# Patient Record
Sex: FEMALE | Race: WHITE | NOT HISPANIC OR LATINO | Employment: FULL TIME | URBAN - METROPOLITAN AREA
[De-identification: names, ages, dates, MRNs, and addresses within clinical notes are randomized per-mention and may not be internally consistent; named-entity substitution may affect disease eponyms.]

---

## 2017-06-01 ENCOUNTER — OFFICE VISIT (OUTPATIENT)
Dept: URGENT CARE | Facility: CLINIC | Age: 52
End: 2017-06-01
Payer: COMMERCIAL

## 2017-06-01 PROCEDURE — 99214 OFFICE O/P EST MOD 30 MIN: CPT

## 2017-06-28 ENCOUNTER — APPOINTMENT (OUTPATIENT)
Dept: LAB | Facility: CLINIC | Age: 52
End: 2017-06-28
Payer: COMMERCIAL

## 2017-06-28 ENCOUNTER — TRANSCRIBE ORDERS (OUTPATIENT)
Dept: LAB | Facility: CLINIC | Age: 52
End: 2017-06-28

## 2017-06-28 ENCOUNTER — ALLSCRIPTS OFFICE VISIT (OUTPATIENT)
Dept: OTHER | Facility: OTHER | Age: 52
End: 2017-06-28

## 2017-06-28 DIAGNOSIS — Z12.11 ENCOUNTER FOR SCREENING FOR MALIGNANT NEOPLASM OF COLON: ICD-10-CM

## 2017-06-28 DIAGNOSIS — K21.9 GASTRO-ESOPHAGEAL REFLUX DISEASE WITHOUT ESOPHAGITIS: ICD-10-CM

## 2017-06-28 DIAGNOSIS — Z13.6 ENCOUNTER FOR SCREENING FOR CARDIOVASCULAR DISORDERS: ICD-10-CM

## 2017-06-28 DIAGNOSIS — Z12.39 ENCOUNTER FOR OTHER SCREENING FOR MALIGNANT NEOPLASM OF BREAST: ICD-10-CM

## 2017-06-28 LAB — HEMOCCULT STL QL IA: NEGATIVE

## 2017-06-28 PROCEDURE — G0328 FECAL BLOOD SCRN IMMUNOASSAY: HCPCS

## 2017-07-11 ENCOUNTER — ALLSCRIPTS OFFICE VISIT (OUTPATIENT)
Dept: OTHER | Facility: OTHER | Age: 52
End: 2017-07-11

## 2017-07-27 ENCOUNTER — GENERIC CONVERSION - ENCOUNTER (OUTPATIENT)
Dept: OTHER | Facility: OTHER | Age: 52
End: 2017-07-27

## 2017-08-04 ENCOUNTER — GENERIC CONVERSION - ENCOUNTER (OUTPATIENT)
Dept: OTHER | Facility: OTHER | Age: 52
End: 2017-08-04

## 2018-01-12 VITALS
HEIGHT: 67 IN | OXYGEN SATURATION: 97 % | HEART RATE: 87 BPM | BODY MASS INDEX: 41.65 KG/M2 | WEIGHT: 265.38 LBS | RESPIRATION RATE: 18 BRPM | DIASTOLIC BLOOD PRESSURE: 76 MMHG | TEMPERATURE: 97.3 F | SYSTOLIC BLOOD PRESSURE: 122 MMHG

## 2018-01-12 NOTE — PROGRESS NOTES
Assessment    1  Encounter for preventive health examination (V70 0) (Z00 00)    Plan  Allergic rhinitis, unspecified    · ZyrTEC Allergy 10 MG Oral Tablet; TAKE 1 TABLET AT BEDTIME  Esophageal reflux    · (1) CBC/ PLT (NO DIFF); Status:Active; Requested BHAVIN:[id number];    · (1) COMPREHENSIVE METABOLIC PANEL; Status:Active; Requested YRX:[id number];   PMH: Head congestion    · ProAir HFA AERS  PMH: History of acute bronchitis    · Proventil  (90 Base) MCG/ACT Inhalation Aerosol Solution   · Azithromycin 250 MG Oral Tablet  Screening for breast cancer    · MAMMO SCREENING BILATERAL W 3D & CAD; Status:Hold For -  Scheduling,Retrospective By Protocol Authorization; Requested ILJ:[id number];   Screening for cardiovascular condition    · (1) LIPID PANEL, FASTING; Status:Active; Requested [id number]:[id number];   Screening for colon cancer    · (1) OCCULT BLOOD, FECAL IMMUNOCHEMICAL TEST; Status:Canceled -  Retrospective By Protocol Authorization; Discussion/Summary    Health maintenance  - Anticipatory guidance given re: diet, exercise and monitor weight on fu: screen for sleep apnea negative  - Non smoker  - Vaccines up to date  - Check fasting labs  - Check FIIT test, pt to get pap/mammo shortly through her OBGYN  - Reviewed pulmonary note: pt does not have asthma but has allergies that flare with seasonal change and cause hyper reactive airways  Symptoms worse before fall: pt was instructed to make a fu apptt before fall season and may consider trial of singulair to manage allergies and/or allergy testing and fu with allergy specialist  - Discussed ice, rest, stretches for achilles tendonitis  Possible side effects of new medications were reviewed with the patient/guardian today  The treatment plan was reviewed with the patient/guardian   The patient/guardian understands and agrees with the treatment plan      Chief Complaint  well visit      History of Present Illness  HPI: Lacey Craft is here for a routine physical   She is currently employed as a   She lives at home with her   She rates her health as very good  She does exercise for 3 days/week and eats a healthy diet  She has no symptoms of depression  She does not smoke, drinks alcohol socially, no use of recreational drugs  She is menopausal (surgical)  She has had negative HIV testing  She occasionally notes issues with leaking of urine  She continues to note recurrent bronchitis that most commonly is triggered by worsening allergies which in turn are aggravated by change in weather and is worst in the fall  Her only other concern is a painful bump behind her right heel, worse with walking and at night  Review of Systems    Constitutional: No fever, no chills, feels well, no tiredness, no recent weight gain or weight loss  Eyes: No complaints of eye pain, no red eyes, no eyesight problems, no discharge, no dry eyes, no itching of eyes  ENT: no complaints of earache, no loss of hearing, no nose bleeds, no nasal discharge, no sore throat, no hoarseness  Cardiovascular: No complaints of slow heart rate, no fast heart rate, no chest pain, no palpitations, no leg claudication, no lower extremity edema  Respiratory: No complaints of shortness of breath, no wheezing, no cough, no SOB on exertion, no orthopnea, no PND  Gastrointestinal: No complaints of abdominal pain, no constipation, no nausea or vomiting, no diarrhea, no bloody stools  Genitourinary: No complaints of dysuria, no incontinence, no pelvic pain, no dysmenorrhea, no vaginal discharge or bleeding  Musculoskeletal: No complaints of arthralgias, no myalgias, no joint swelling or stiffness, no limb pain or swelling  Integumentary: No complaints of skin rash or lesions, no itching, no skin wounds, no breast pain or lump     Neurological: No complaints of headache, no confusion, no convulsions, no numbness, no dizziness or fainting, no tingling, no limb weakness, no difficulty walking  Psychiatric: Not suicidal, no sleep disturbance, no anxiety or depression, no change in personality, no emotional problems  Endocrine: No complaints of proptosis, no hot flashes, no muscle weakness, no deepening of the voice, no feelings of weakness  Hematologic/Lymphatic: No complaints of swollen glands, no swollen glands in the neck, does not bleed easily, does not bruise easily  Active Problems    1  Allergic rhinitis, unspecified (477 9) (J30 9)   2  Chronic sinusitis with recurrent bronchitis (473 9,490) (J32 9,J40)   3  Esophageal reflux (530 81) (K21 9)   4  Obesity (278 00) (E66 9)   5  Urge incontinence (788 31) (N39 41)   6  Vitamin D deficiency (268 9) (E55 9)    Past Medical History    · Acute bacterial rhinosinusitis (461 9) (J01 90,B96 89)   · History of Acute frontal sinusitis (461 1) (J01 10)   · History of Chest congestion (786 9) (R09 89)   · History of Cough (786 2) (R05)   · History of acute bronchitis (V12 69) (Z87 09)   · History of bronchitis (V12 69) (Z87 09)   · History of pneumonia (V12 61) (Z87 01)    Surgical History    · History of Appendectomy   · History of  Section   · History of Hysterectomy   · History of Knee Surgery    Family History  Mother    · Family history of thyroid disease (V18 19) (Z80 46)    Social History    · Denied: History of Alcohol   · Never a smoker   · History of Never A Smoker   · Denied: History of No drug use    Current Meds   1  Azithromycin 250 MG Oral Tablet; TAKE 2 TABLETS ON DAY 1 THEN TAKE 1 TABLET A   DAY FOR 4 DAYS; Therapy: [id number] to (Last Rx:2017)  Requested for: 2017 Ordered   2  PriLOSEC OTC TBEC; Therapy: (Recorded:2014) to Recorded   3  ProAir  (90 Base) MCG/ACT Inhalation Aerosol Solution; INHALE 2 PUFFS   EVERY 4 HOURS AS NEEDED; Therapy: 45PBU7532 to (Last Rx:2017)  Requested for: 2017 Ordered   4  ProAir HFA AERS; Therapy: (Recorded:09Ufe3502) to Recorded   5  Proventil  (90 Base) MCG/ACT Inhalation Aerosol Solution; INHALE 1 PUFF   EVERY 4 HOURS AS NEEDED; Therapy: 79GLX9905 to (Last Rx:34Svj0061)  Requested for: 71BGH2645 Ordered    Allergies    1  Codeine Sulfate TABS   2  PredniSONE TABS   3  Sulfa Drugs   4  Vicodin TABS    5  Tape    Vitals   Recorded: 28Jun2017 08:23AM   Temperature 97 2 F   Heart Rate 89   Respiration 18   Systolic 330   Diastolic 86   Height 5 ft 7 in   Weight 266 lb 2 0 oz   BMI Calculated 41 68   BSA Calculated 2 28   O2 Saturation 97     Physical Exam    Constitutional   General appearance: No acute distress, well appearing and well nourished  Eyes   Conjunctiva and lids: No swelling, erythema or discharge  Pupils and irises: Equal, round and reactive to light  Ears, Nose, Mouth, and Throat   External inspection of ears and nose: Abnormal   left nasal middle turbinate hypertrophy  Otoscopic examination: Tympanic membranes translucent with normal light reflex  Canals patent without erythema  Oropharynx: Normal with no erythema, edema, exudate or lesions  Pulmonary   Respiratory effort: No increased work of breathing or signs of respiratory distress  Auscultation of lungs: Clear to auscultation  Cardiovascular   Palpation of heart: Normal PMI, no thrills  Auscultation of heart: Normal rate and rhythm, normal S1 and S2, without murmurs  Examination of extremities for edema and/or varicosities: Normal     Abdomen   Abdomen: Non-tender, no masses  Liver and spleen: No hepatomegaly or splenomegaly  Lymphatic   Palpation of lymph nodes in neck: No lymphadenopathy  Musculoskeletal   Gait and station: Normal     Digits and nails: Normal without clubbing or cyanosis  Inspection/palpation of joints, bones, and muscles: Abnormal   tenderness to palpation over right heel, achilles tendon insertion site  Skin   Skin and subcutaneous tissue: Normal without rashes or lesions      Neurologic   Cranial nerves: Cranial nerves 2-12 intact  Reflexes: 2+ and symmetric  Sensation: No sensory loss      Psychiatric   Orientation to person, place, and time: Normal     Mood and affect: Normal        Signatures   Electronically signed by : Jose De Jessu Fowler MD; Jun 28 2017  9:03AM EST                       (Author)

## 2018-01-13 VITALS
OXYGEN SATURATION: 97 % | SYSTOLIC BLOOD PRESSURE: 122 MMHG | HEART RATE: 89 BPM | BODY MASS INDEX: 41.77 KG/M2 | TEMPERATURE: 97.2 F | HEIGHT: 67 IN | RESPIRATION RATE: 18 BRPM | WEIGHT: 266.13 LBS | DIASTOLIC BLOOD PRESSURE: 86 MMHG

## 2018-07-23 ENCOUNTER — TELEPHONE (OUTPATIENT)
Dept: UROLOGY | Facility: AMBULATORY SURGERY CENTER | Age: 53
End: 2018-07-23

## 2018-07-23 NOTE — TELEPHONE ENCOUNTER
Reason for appointment/Complaint/Diagnosis : referred by PCP brother had kidney ca     Insurance: [employer]    History of Cancer? NO    Previous urologist?    NONE                  Records requested/where? No    Outside testing/where? NONE    Location Preference for office visit?  Dr Jeneal Ahumada in the [facility] office

## 2018-10-08 ENCOUNTER — OFFICE VISIT (OUTPATIENT)
Dept: UROLOGY | Facility: CLINIC | Age: 53
End: 2018-10-08
Payer: COMMERCIAL

## 2018-10-08 VITALS
SYSTOLIC BLOOD PRESSURE: 142 MMHG | HEIGHT: 67 IN | WEIGHT: 262 LBS | DIASTOLIC BLOOD PRESSURE: 98 MMHG | BODY MASS INDEX: 41.12 KG/M2

## 2018-10-08 DIAGNOSIS — R32 URINARY INCONTINENCE, UNSPECIFIED TYPE: Primary | ICD-10-CM

## 2018-10-08 DIAGNOSIS — N20.0 CALCULUS OF KIDNEY: ICD-10-CM

## 2018-10-08 LAB
SL AMB  POCT GLUCOSE, UA: NORMAL
SL AMB LEUKOCYTE ESTERASE,UA: NORMAL
SL AMB POCT BILIRUBIN,UA: NORMAL
SL AMB POCT BLOOD,UA: NORMAL
SL AMB POCT CLARITY,UA: NORMAL
SL AMB POCT COLOR,UA: YELLOW
SL AMB POCT KETONES,UA: NORMAL
SL AMB POCT NITRITE,UA: NORMAL
SL AMB POCT PH,UA: 6.5
SL AMB POCT SPECIFIC GRAVITY,UA: 1.01
SL AMB POCT URINE PROTEIN: NORMAL
SL AMB POCT UROBILINOGEN: NORMAL

## 2018-10-08 PROCEDURE — 81002 URINALYSIS NONAUTO W/O SCOPE: CPT | Performed by: UROLOGY

## 2018-10-08 PROCEDURE — 99244 OFF/OP CNSLTJ NEW/EST MOD 40: CPT | Performed by: UROLOGY

## 2018-10-08 RX ORDER — OMEPRAZOLE 10 MG/1
10 CAPSULE, DELAYED RELEASE ORAL
COMMUNITY
End: 2018-12-28

## 2018-10-08 RX ORDER — CETIRIZINE HYDROCHLORIDE 10 MG/1
10 TABLET ORAL
COMMUNITY

## 2018-10-17 ENCOUNTER — HOSPITAL ENCOUNTER (OUTPATIENT)
Dept: ULTRASOUND IMAGING | Facility: HOSPITAL | Age: 53
Discharge: HOME | End: 2018-10-17
Payer: COMMERCIAL

## 2018-10-17 DIAGNOSIS — N20.0 CALCULUS OF KIDNEY: ICD-10-CM

## 2018-10-17 PROCEDURE — 76770 US EXAM ABDO BACK WALL COMP: CPT

## 2018-12-28 ENCOUNTER — OFFICE VISIT (OUTPATIENT)
Dept: URGENT CARE | Facility: CLINIC | Age: 53
End: 2018-12-28
Payer: COMMERCIAL

## 2018-12-28 VITALS
DIASTOLIC BLOOD PRESSURE: 90 MMHG | BODY MASS INDEX: 41.12 KG/M2 | RESPIRATION RATE: 18 BRPM | HEART RATE: 66 BPM | WEIGHT: 262 LBS | TEMPERATURE: 98.8 F | OXYGEN SATURATION: 96 % | HEIGHT: 67 IN | SYSTOLIC BLOOD PRESSURE: 142 MMHG

## 2018-12-28 DIAGNOSIS — J20.8 ACUTE BACTERIAL BRONCHITIS: Primary | ICD-10-CM

## 2018-12-28 DIAGNOSIS — B96.89 ACUTE BACTERIAL BRONCHITIS: Primary | ICD-10-CM

## 2018-12-28 LAB — S PYO AG THROAT QL: NEGATIVE

## 2018-12-28 PROCEDURE — 99213 OFFICE O/P EST LOW 20 MIN: CPT | Performed by: PHYSICIAN ASSISTANT

## 2018-12-28 PROCEDURE — 87430 STREP A AG IA: CPT | Performed by: PHYSICIAN ASSISTANT

## 2018-12-28 RX ORDER — INFLUENZA VIRUS VACCINE 15; 15; 15; 15 UG/.5ML; UG/.5ML; UG/.5ML; UG/.5ML
SUSPENSION INTRAMUSCULAR
Refills: 0 | COMMUNITY
Start: 2018-10-02

## 2018-12-28 RX ORDER — FLUTICASONE PROPIONATE 110 UG/1
2 AEROSOL, METERED RESPIRATORY (INHALATION) 2 TIMES DAILY
Qty: 1 INHALER | Refills: 0 | Status: SHIPPED | OUTPATIENT
Start: 2018-12-28 | End: 2019-10-29

## 2018-12-28 RX ORDER — AZITHROMYCIN 250 MG/1
TABLET, FILM COATED ORAL
Qty: 6 TABLET | Refills: 0 | Status: SHIPPED | OUTPATIENT
Start: 2018-12-28 | End: 2019-01-01

## 2018-12-28 RX ORDER — ALBUTEROL SULFATE 90 UG/1
2 AEROSOL, METERED RESPIRATORY (INHALATION) EVERY 6 HOURS PRN
Qty: 6.7 G | Refills: 0 | Status: SHIPPED | OUTPATIENT
Start: 2018-12-28 | End: 2019-10-29

## 2018-12-28 NOTE — PROGRESS NOTES
[facility]        NAME: Anai Guerra is a 47 y o  female  : 1965    MRN: [id number]  DATE: 2018  TIME: 10:53 AM    Assessment and Plan   Acute bacterial bronchitis [J20 8, B96 89]  1  Acute bacterial bronchitis  POCT rapid strepA    azithromycin (ZITHROMAX) 250 mg tablet    albuterol (PROVENTIL HFA) 90 mcg/act inhaler    fluticasone (FLOVENT HFA) 110 MCG/ACT inhaler     Rapid strep negative    Patient Instructions     Follow up with PCP in 3-5 days  Proceed to  ER if symptoms worsen  Chief Complaint     Chief Complaint   Patient presents with    Sore Throat     C/O sore throat, swollen glands and occasional wheezing x 4 days  History of Present Illness       47 y o  Female presents with sore throat, cough and wheezing for 4 days  Patient states she has a hx of bronchitis  When she gets bronchitis she presents with sore throat  There is no fever  She feels tight in the chest and wheezing more at night  Patient has an inhaler at home she is unsure of the name, she states it has a steroid in it  She ran out of this inhaler  She states she was diagnosed with an allergy induced asthma  She does not take inhalers on a daily basis for the asthma  Review of Systems   Review of Systems   Constitutional: Negative for chills, fatigue and fever  HENT: Positive for sore throat  Negative for congestion, ear pain, sinus pain and trouble swallowing  Eyes: Negative for pain, discharge and redness  Respiratory: Positive for cough and chest tightness  Negative for shortness of breath and wheezing  Cardiovascular: Negative for chest pain, palpitations and leg swelling  Gastrointestinal: Negative for abdominal pain, diarrhea, nausea and vomiting  Musculoskeletal: Negative for arthralgias, joint swelling and myalgias  Skin: Negative for rash  Neurological: Negative for dizziness, weakness, numbness and headaches           Current Medications       Current Outpatient Prescriptions:     albuterol (PROVENTIL HFA) 90 mcg/act inhaler, Inhale 2 puffs every 6 (six) hours as needed for wheezing, Disp: 6 7 g, Rfl: 0    azithromycin (ZITHROMAX) 250 mg tablet, Take 2 tablets today then 1 tablet daily x 4 days, Disp: 6 tablet, Rfl: 0    cetirizine (ZyrTEC) 10 mg tablet, Take 10 mg by mouth, Disp: , Rfl:     FLUARIX QUADRIVALENT 0 5 ML SHILO, inject 0 5 milliliter intramuscularly, Disp: , Rfl: 0    fluticasone (FLOVENT HFA) 110 MCG/ACT inhaler, Inhale 2 puffs 2 (two) times a day Rinse mouth after use , Disp: 1 Inhaler, Rfl: 0    Current Allergies     Allergies as of 2018 - Reviewed 2018   Allergen Reaction Noted    Prednisone Other (See Comments) 2016    Sulfa antibiotics Hives 2016    Vicodin [hydrocodone-acetaminophen] Anxiety 2016    Codeine Hives 2016    Colistin      Other              The following portions of the patient's history were reviewed and updated as appropriate: allergies, current medications, past family history, past medical history, past social history, past surgical history and problem list      Past Medical History:   Diagnosis Date    Asthma due to seasonal allergies     Kidney stone        Past Surgical History:   Procedure Laterality Date    APPENDECTOMY  10/1982     SECTION      two- [address]    partial    REPLACEMENT TOTAL KNEE BILATERAL      right- , left-        Family History   Problem Relation Age of Onset    Heart disease Father     Hypertension Father     Urolithiasis Mother     Kidney cancer Brother     Urolithiasis Brother     Hepatitis Brother         hep c    Kidney disease Brother     Urolithiasis Sister     Diabetes Sister     Kidney disease Sister     Urolithiasis Daughter          Medications have been verified          Objective   /90   Pulse 66   Temp 98 8 °F (37 1 °C) (Tympanic)   Resp 18   Ht 5' 6 5" (1 689 m)   Wt 119 kg (262 lb) LMP  (LMP Unknown)   SpO2 96%   BMI 41 65 kg/m²        Physical Exam     Physical Exam   Constitutional: She is oriented to person, place, and time  She appears well-developed and well-nourished  No distress  HENT:   Head: Normocephalic  Right Ear: External ear normal    Left Ear: External ear normal    Mouth/Throat: Oropharynx is clear and moist    Eyes: Pupils are equal, round, and reactive to light  Conjunctivae and EOM are normal    Neck: Normal range of motion  Neck supple  Cardiovascular: Normal rate, regular rhythm and normal heart sounds  No murmur heard  Pulmonary/Chest: Effort normal  No respiratory distress  She has wheezes in the right lower field  Abdominal: Soft  Bowel sounds are normal  There is no tenderness  Musculoskeletal: Normal range of motion  Lymphadenopathy:     She has no cervical adenopathy  Neurological: She is alert and oriented to person, place, and time  She has normal reflexes  Skin: Skin is warm and dry  Psychiatric: She has a normal mood and affect  Nursing note and vitals reviewed

## 2019-10-29 ENCOUNTER — APPOINTMENT (EMERGENCY)
Dept: CT IMAGING | Facility: HOSPITAL | Age: 54
End: 2019-10-29
Payer: COMMERCIAL

## 2019-10-29 ENCOUNTER — HOSPITAL ENCOUNTER (EMERGENCY)
Facility: HOSPITAL | Age: 54
Discharge: HOME | End: 2019-10-29
Attending: EMERGENCY MEDICINE | Admitting: EMERGENCY MEDICINE
Payer: COMMERCIAL

## 2019-10-29 VITALS
SYSTOLIC BLOOD PRESSURE: 138 MMHG | RESPIRATION RATE: 16 BRPM | DIASTOLIC BLOOD PRESSURE: 80 MMHG | TEMPERATURE: 97 F | HEIGHT: 67 IN | HEART RATE: 95 BPM | WEIGHT: 235 LBS | OXYGEN SATURATION: 98 % | BODY MASS INDEX: 36.88 KG/M2

## 2019-10-29 DIAGNOSIS — K52.9 GASTROENTERITIS: Primary | ICD-10-CM

## 2019-10-29 LAB
ALBUMIN SERPL BCP-MCNC: 4.7 G/DL (ref 3.5–5.7)
ALP SERPL-CCNC: 93 U/L (ref 40–150)
ALT SERPL W P-5'-P-CCNC: 68 U/L (ref 7–52)
ANION GAP SERPL CALCULATED.3IONS-SCNC: 9 MMOL/L (ref 4–13)
AST SERPL W P-5'-P-CCNC: 19 U/L (ref 13–39)
BACTERIA UR QL AUTO: ABNORMAL /HPF
BASOPHILS # BLD AUTO: 0.1 THOUSANDS/ΜL (ref 0–0.1)
BASOPHILS NFR BLD AUTO: 1 % (ref 0–2)
BILIRUB SERPL-MCNC: 0.9 MG/DL (ref 0.2–1)
BILIRUB UR QL STRIP: NEGATIVE
BUN SERPL-MCNC: 15 MG/DL (ref 7–25)
CALCIUM SERPL-MCNC: 9.7 MG/DL (ref 8.6–10.5)
CHLORIDE SERPL-SCNC: 105 MMOL/L (ref 98–107)
CLARITY UR: CLEAR
CO2 SERPL-SCNC: 23 MMOL/L (ref 21–31)
COLOR UR: YELLOW
CREAT SERPL-MCNC: 0.69 MG/DL (ref 0.6–1.2)
EOSINOPHIL # BLD AUTO: 0.2 THOUSAND/ΜL (ref 0–0.61)
EOSINOPHIL NFR BLD AUTO: 2 % (ref 0–5)
ERYTHROCYTE [DISTWIDTH] IN BLOOD BY AUTOMATED COUNT: 14.1 % (ref 11.5–14.5)
GFR SERPL CREATININE-BSD FRML MDRD: 100 ML/MIN/1.73SQ M
GLUCOSE SERPL-MCNC: 113 MG/DL (ref 65–99)
GLUCOSE UR STRIP-MCNC: NEGATIVE MG/DL
HCT VFR BLD AUTO: 49.5 % (ref 42–47)
HGB BLD-MCNC: 16.3 G/DL (ref 12–16)
HGB UR QL STRIP.AUTO: NEGATIVE
KETONES UR STRIP-MCNC: NEGATIVE MG/DL
LEUKOCYTE ESTERASE UR QL STRIP: NEGATIVE
LIPASE SERPL-CCNC: <10 U/L (ref 11–82)
LYMPHOCYTES # BLD AUTO: 1.2 THOUSANDS/ΜL (ref 0.6–4.47)
LYMPHOCYTES NFR BLD AUTO: 11 % (ref 21–51)
MCH RBC QN AUTO: 27.2 PG (ref 26–34)
MCHC RBC AUTO-ENTMCNC: 32.9 G/DL (ref 31–37)
MCV RBC AUTO: 83 FL (ref 81–99)
MONOCYTES # BLD AUTO: 0.9 THOUSAND/ΜL (ref 0.17–1.22)
MONOCYTES NFR BLD AUTO: 9 % (ref 2–12)
MUCOUS THREADS UR QL AUTO: ABNORMAL
NEUTROPHILS # BLD AUTO: 7.9 THOUSANDS/ΜL (ref 1.4–6.5)
NEUTS SEG NFR BLD AUTO: 77 % (ref 42–75)
NITRITE UR QL STRIP: NEGATIVE
NON-SQ EPI CELLS URNS QL MICRO: ABNORMAL /HPF
PH UR STRIP.AUTO: 5.5 [PH]
PLATELET # BLD AUTO: 299 THOUSANDS/UL (ref 149–390)
PMV BLD AUTO: 8.3 FL (ref 8.6–11.7)
POTASSIUM SERPL-SCNC: 3.6 MMOL/L (ref 3.5–5.5)
PROT SERPL-MCNC: 7.3 G/DL (ref 6.4–8.9)
PROT UR STRIP-MCNC: ABNORMAL MG/DL
RBC # BLD AUTO: 5.98 MILLION/UL (ref 3.9–5.2)
RBC #/AREA URNS AUTO: ABNORMAL /HPF
SODIUM SERPL-SCNC: 137 MMOL/L (ref 134–143)
SP GR UR STRIP.AUTO: >=1.03 (ref 1–1.03)
UROBILINOGEN UR QL STRIP.AUTO: 0.2 E.U./DL
WBC # BLD AUTO: 10.3 THOUSAND/UL (ref 4.8–10.8)
WBC #/AREA URNS AUTO: ABNORMAL /HPF

## 2019-10-29 PROCEDURE — 83690 ASSAY OF LIPASE: CPT | Performed by: NURSE PRACTITIONER

## 2019-10-29 PROCEDURE — 74177 CT ABD & PELVIS W/CONTRAST: CPT

## 2019-10-29 PROCEDURE — 81001 URINALYSIS AUTO W/SCOPE: CPT | Performed by: NURSE PRACTITIONER

## 2019-10-29 PROCEDURE — 36415 COLL VENOUS BLD VENIPUNCTURE: CPT | Performed by: NURSE PRACTITIONER

## 2019-10-29 PROCEDURE — 80053 COMPREHEN METABOLIC PANEL: CPT | Performed by: NURSE PRACTITIONER

## 2019-10-29 PROCEDURE — 85025 COMPLETE CBC W/AUTO DIFF WBC: CPT | Performed by: NURSE PRACTITIONER

## 2019-10-29 PROCEDURE — 99284 EMERGENCY DEPT VISIT MOD MDM: CPT

## 2019-10-29 RX ORDER — ONDANSETRON 4 MG/1
4 TABLET, FILM COATED ORAL EVERY 6 HOURS
Qty: 12 TABLET | Refills: 0 | Status: SHIPPED | OUTPATIENT
Start: 2019-10-29 | End: 2020-01-17

## 2019-10-29 RX ORDER — SODIUM CHLORIDE 9 MG/ML
125 INJECTION, SOLUTION INTRAVENOUS CONTINUOUS
Status: DISCONTINUED | OUTPATIENT
Start: 2019-10-29 | End: 2019-10-29 | Stop reason: HOSPADM

## 2019-10-29 RX ORDER — DICYCLOMINE HCL 20 MG
20 TABLET ORAL 2 TIMES DAILY
Qty: 20 TABLET | Refills: 0 | Status: SHIPPED | OUTPATIENT
Start: 2019-10-29 | End: 2020-01-17

## 2019-10-29 RX ADMIN — SODIUM CHLORIDE 125 ML/HR: 0.9 INJECTION, SOLUTION INTRAVENOUS at 09:54

## 2019-10-29 RX ADMIN — IOHEXOL 100 ML: 350 INJECTION, SOLUTION INTRAVENOUS at 11:18

## 2019-10-29 NOTE — ED PROVIDER NOTES
History  Chief Complaint   Patient presents with    Abdominal Pain     started last Thursday, improvement over the weekend, and returned last night   Diarrhea    Vomiting     trouble keeping foods down     N/v/d intermittently since Thursday, no ill contacts           Prior to Admission Medications   Prescriptions Last Dose Informant Patient Reported? Taking? FLUARIX QUADRIVALENT 0 5 ML SHILO   Yes No   Sig: inject 0 5 milliliter intramuscularly   cetirizine (ZyrTEC) 10 mg tablet 10/28/2019 at Unknown time Self Yes Yes   Sig: Take 10 mg by mouth      Facility-Administered Medications: None       Past Medical History:   Diagnosis Date    Asthma due to seasonal allergies     Kidney stone        Past Surgical History:   Procedure Laterality Date    APPENDECTOMY  10/1982     SECTION      two-  &     HYSTERECTOMY      partial    REPLACEMENT TOTAL KNEE BILATERAL      right- , left-        Family History   Problem Relation Age of Onset    Heart disease Father     Hypertension Father     Urolithiasis Mother     Kidney cancer Brother     Urolithiasis Brother     Hepatitis Brother         hep c    Kidney disease Brother     Urolithiasis Sister     Diabetes Sister     Kidney disease Sister     Urolithiasis Daughter      I have reviewed and agree with the history as documented  Social History     Tobacco Use    Smoking status: Never Smoker    Smokeless tobacco: Never Used   Substance Use Topics    Alcohol use: Yes     Comment: occasional    Drug use: No        Review of Systems   Gastrointestinal: Positive for abdominal pain, diarrhea, nausea and vomiting  Physical Exam  Physical Exam   Constitutional: She is oriented to person, place, and time  She appears well-developed and well-nourished  HENT:   Head: Normocephalic and atraumatic  Eyes: Pupils are equal, round, and reactive to light   EOM are normal    Cardiovascular: Normal rate, regular rhythm, normal heart sounds and intact distal pulses  Pulmonary/Chest: Effort normal and breath sounds normal    Abdominal: Soft  Normal appearance and bowel sounds are normal    Neurological: She is alert and oriented to person, place, and time  Skin: Skin is warm and dry  Capillary refill takes less than 2 seconds  Nursing note and vitals reviewed        Vital Signs  ED Triage Vitals [10/29/19 0923]   Temperature Pulse Respirations Blood Pressure SpO2   (!) 96 9 °F (36 1 °C) 94 16 144/88 96 %      Temp Source Heart Rate Source Patient Position - Orthostatic VS BP Location FiO2 (%)   Temporal Monitor Sitting Left arm --      Pain Score       No Pain           Vitals:    10/29/19 0923   BP: 144/88   Pulse: 94   Patient Position - Orthostatic VS: Sitting         Visual Acuity      ED Medications  Medications   sodium chloride 0 9 % infusion (125 mL/hr Intravenous New Bag 10/29/19 0954)   iohexol (OMNIPAQUE) 350 MG/ML injection (MULTI-DOSE) 100 mL (100 mL Intravenous Given 10/29/19 1118)       Diagnostic Studies  Results Reviewed     Procedure Component Value Units Date/Time    Lipase [[id number]]  (Abnormal) Collected:  10/29/19 0954    Lab Status:  Final result Specimen:  Blood from Arm, Left Updated:  10/29/19 1046     Lipase <10 u/L     CMP [[id number]]  (Abnormal) Collected:  10/29/19 0954    Lab Status:  Final result Specimen:  Blood from Arm, Left Updated:  10/29/19 1045     Sodium 137 mmol/L      Potassium 3 6 mmol/L      Chloride 105 mmol/L      CO2 23 mmol/L      ANION GAP 9 mmol/L      BUN 15 mg/dL      Creatinine 0 69 mg/dL      Glucose 113 mg/dL      Calcium 9 7 mg/dL      AST 19 U/L      ALT 68 U/L      Alkaline Phosphatase 93 U/L      Total Protein 7 3 g/dL      Albumin 4 7 g/dL      Total Bilirubin 0 90 mg/dL      eGFR 100 ml/min/1 73sq m     Narrative:       [place] guidelines for Chronic Kidney Disease (CKD):     Stage 1 with normal or high GFR (GFR > 90 mL/min/1 73 square meters)   Stage 2 Mild CKD (GFR = 60-89 mL/min/1 73 square meters)    Stage 3A Moderate CKD (GFR = 45-59 mL/min/1 73 square meters)    Stage 3B Moderate CKD (GFR = 30-44 mL/min/1 73 square meters)    Stage 4 Severe CKD (GFR = 15-29 mL/min/1 73 square meters)    Stage 5 End Stage CKD (GFR <15 mL/min/1 73 square meters)  Note: GFR calculation is accurate only with a steady state creatinine    Urine Microscopic [[id number]]  (Abnormal) Collected:  10/29/19 1009    Lab Status:  Final result Specimen:  Urine, Clean Catch Updated:  10/29/19 1034     RBC, UA 1-2 /hpf      WBC, UA 2-4 /hpf      Epithelial Cells Moderate /hpf      Bacteria, UA Occasional /hpf      MUCUS THREADS Moderate    CBC and differential [[id number]]  (Abnormal) Collected:  10/29/19 0954    Lab Status:  Final result Specimen:  Blood from Arm, Left Updated:  10/29/19 1024     WBC 10 30 Thousand/uL      RBC 5 98 Million/uL      Hemoglobin 16 3 g/dL      Hematocrit 49 5 %      MCV 83 fL      MCH 27 2 pg      MCHC 32 9 g/dL      RDW 14 1 %      MPV 8 3 fL      Platelets 052 Thousands/uL      Neutrophils Relative 77 %      Lymphocytes Relative 11 %      Monocytes Relative 9 %      Eosinophils Relative 2 %      Basophils Relative 1 %      Neutrophils Absolute 7 90 Thousands/µL      Lymphocytes Absolute 1 20 Thousands/µL      Monocytes Absolute 0 90 Thousand/µL      Eosinophils Absolute 0 20 Thousand/µL      Basophils Absolute 0 10 Thousands/µL     UA w Reflex to Microscopic w Reflex to Culture [[id number]]  (Abnormal) Collected:  10/29/19 1009    Lab Status:  Final result Specimen:  Urine, Clean Catch Updated:  10/29/19 1024     Color, UA Yellow     Clarity, UA Clear     Specific Gravity, UA >=1 030     pH, UA 5 5     Leukocytes, UA Negative     Nitrite, UA Negative     Protein, UA Trace mg/dl      Glucose, UA Negative mg/dl      Ketones, UA Negative mg/dl      Urobilinogen, UA 0 2 E U /dl      Bilirubin, UA Negative     Blood, UA Negative                 CT abdomen pelvis with contrast   Final Result by Antonia Goodwin MD (10/29 2177)      No acute pathology  Colonic diverticulosis without diverticulitis  Hepatomegaly  Tiny left renal angiomyolipoma  Workstation performed: [id number]                    Procedures  Procedures       ED Course  ED Course as of Oct 29 1224   Tue Oct 29, 2019   1218 Outpt mgt with pcp                                   MDM    Disposition  Final diagnoses:   Gastroenteritis     Time reflects when diagnosis was documented in both MDM as applicable and the Disposition within this note     Time User Action Codes Description Comment    10/29/2019 12:20 PM Khloe Degree Add [K52 9] Gastroenteritis       ED Disposition     ED Disposition Condition Date/Time Comment    Discharge Stable Tue Oct 29, 2019 12:20 PM Ryan Arnold discharge to home/self care  Follow-up Information     Follow up With Specialties Details Why Tami Burton [address], [address] [facility], Nurse Practitioner   [address] [place], [place] [phone]            Patient's Medications   Discharge Prescriptions    DICYCLOMINE (BENTYL) 20 MG TABLET    Take 1 tablet (20 mg total) by mouth 2 (two) times a day       Start Date: 10/29/2019End Date: --       Order Dose: 20 mg       Quantity: 20 tablet    Refills: 0    ONDANSETRON (ZOFRAN) 4 MG TABLET    Take 1 tablet (4 mg total) by mouth every 6 (six) hours       Start Date: 10/29/2019End Date: --       Order Dose: 4 mg       Quantity: 12 tablet    Refills: 0     No discharge procedures on file      ED Provider  Electronically Signed by           JAMES Lynn  10/29/19 0376

## 2019-10-31 ENCOUNTER — VBI (OUTPATIENT)
Dept: INTERNAL MEDICINE CLINIC | Facility: CLINIC | Age: 54
End: 2019-10-31

## 2019-10-31 NOTE — LETTER
[facility] PRIMARY CARE [address]  [facility] [address]    Date: 11/04/19  [address] [facility] [address]    Dear Lelo Carolina: Thank you for choosing [facility] emergency department for care  As your primary care provider we want to make sure that your ongoing medical care is being addressed  If you require follow up care as a result of your emergency department visit, there are a few things we would like you to know  As part of our continuing commitment to caring for our patient, we have added more same day appointments and have extended our office hours to meet your medical needs  After hours, on-call physicians are available via our main office line  We encourage you to contact our office prior to seeking treatment to discuss your symptoms with our medical staff  Together, we can determine the correct course of action  A majority of non-emergent conditions such as: common cold, flu-like symptoms, fevers, strains/sprains, dislocations, minor burns, cuts and animal bites can be treated at [facility] facilities  Diagnostic testing is available at some sites  Of course, if you are experiencing a life threatening medical emergency call 911 or proceed directly to the nearest emergency room      Your nearest [address] [place] [address] facility is conveniently located at:    [address]    [phone]    SKIP THE WAIT  Conveniently offered at most [facility] locations  [facility] your spot online at www [email or web address] or on the [facility] 67    Sincerely,    [address] [place] [address] [place]  Dept: [phone]

## 2019-10-31 NOTE — TELEPHONE ENCOUNTER
Sadia Vickyser    ED Visit Information     Ed visit date: 10/29/19 @09:16 am  Diagnosis Description: Gastroenteritis  In Network? Yes [place] [facility]                                                                                                                                                                                                                                                                                                            Discharge status: Home  Discharged with meds ? Yes  Number of ED visits to date: 1  ED Severity:3     Outreach Information    Outreach successful: No 2  Date letter mailed:yes  Date Finalized:11/4/19    Care Coordination    Follow up appointment with pcp: no 0  Transportation issues ?  NA    Value Base Outreach    Outreach type:  3 Day Outreach  Emergent necessity warranted by diagnosis:  No  [facility] PCP:  Yes  Practice Contacted Patient ?:  No  Pt had ED follow up with pcp/staff ?:  No    11/01/2019 09:51 AM Phone ([employer]) Vikash Dennis (Self) [phone] (H) Remove  Left Message - Attx2 lmom    By Jeramie Bonner    10/31/2019 02:34 PM Phone (Felicia) Vikash Dennis (Self) [phone] (H)   Left Message - att x1 lmom    By Jeramie Bonner

## 2020-01-17 ENCOUNTER — OFFICE VISIT (OUTPATIENT)
Dept: URGENT CARE | Facility: CLINIC | Age: 55
End: 2020-01-17
Payer: COMMERCIAL

## 2020-01-17 VITALS
WEIGHT: 235 LBS | DIASTOLIC BLOOD PRESSURE: 94 MMHG | BODY MASS INDEX: 36.88 KG/M2 | HEIGHT: 67 IN | RESPIRATION RATE: 18 BRPM | SYSTOLIC BLOOD PRESSURE: 148 MMHG | OXYGEN SATURATION: 97 % | TEMPERATURE: 98.5 F | HEART RATE: 82 BPM

## 2020-01-17 DIAGNOSIS — J20.8 ACUTE BACTERIAL BRONCHITIS: Primary | ICD-10-CM

## 2020-01-17 DIAGNOSIS — B96.89 ACUTE BACTERIAL BRONCHITIS: Primary | ICD-10-CM

## 2020-01-17 PROCEDURE — 99213 OFFICE O/P EST LOW 20 MIN: CPT | Performed by: PHYSICIAN ASSISTANT

## 2020-01-17 RX ORDER — CLOTRIMAZOLE AND BETAMETHASONE DIPROPIONATE 10; .64 MG/G; MG/G
CREAM TOPICAL 2 TIMES DAILY
COMMUNITY
Start: 2019-08-01

## 2020-01-17 RX ORDER — AZITHROMYCIN 250 MG/1
TABLET, FILM COATED ORAL
Qty: 6 TABLET | Refills: 0 | Status: SHIPPED | OUTPATIENT
Start: 2020-01-17 | End: 2020-01-21

## 2020-01-17 RX ORDER — BENZONATATE 100 MG/1
CAPSULE ORAL
Qty: 30 CAPSULE | Refills: 0 | Status: SHIPPED | OUTPATIENT
Start: 2020-01-17 | End: 2022-03-31

## 2020-01-17 NOTE — PATIENT INSTRUCTIONS

## 2020-01-17 NOTE — PROGRESS NOTES
[facility]        NAME: Gen Brannon is a 49 y o  female  : 1965    MRN: [id number]  DATE: 2020  TIME: 2:08 PM    Assessment and Plan   Acute bacterial bronchitis [J20 8, B96 89]  1  Acute bacterial bronchitis  azithromycin (ZITHROMAX) 250 mg tablet    benzonatate (TESSALON PERLES) 100 mg capsule         Patient Instructions     Start Zithromax as prescribed  Take Tessalon as needed for cough  Tylenol Motrin as needed for fever pain  Follow up with PCP in 3-5 days  Proceed to  ER if symptoms worsen  Chief Complaint     Chief Complaint   Patient presents with    Cold Like Symptoms     C/O harsh, persistent cough, sore throat, post nasal drip, body aches, and tightness in the chest for more than 1 week  Pt did have the flu vaccine  History of Present Illness       Patient started with cold symptoms 2 weeks ago  She is now having a productive cough with thick mucus burning in her chest with coughing  Denies any shortness of breath  Does have runny stuffy nose and a mild sore throat  She feels fevers did not take her temperature has intermittent chills  Patient did have a flu vaccine this season  Review of Systems   Review of Systems   Constitutional: Positive for chills and fever  HENT: Positive for congestion, postnasal drip, rhinorrhea, sinus pressure and sore throat  Negative for ear pain and trouble swallowing  Respiratory: Positive for cough  Negative for chest tightness, shortness of breath and wheezing  Cardiovascular: Positive for chest pain (Burning in chest with coughing only)  Gastrointestinal: Negative for nausea and vomiting  Musculoskeletal: Negative for myalgias  Skin: Negative for rash  Neurological: Positive for headaches  Hematological: Negative for adenopathy           Current Medications       Current Outpatient Medications:     Albuterol Sulfate (VENTOLIN HFA IN), Inhale, Disp: , Rfl:     clotrimazole-betamethasone (LOTRISONE) 1-0 05 % cream, Apply topically 2 (two) times a day, Disp: , Rfl:     azithromycin (ZITHROMAX) 250 mg tablet, Take 2 tablets today then 1 tablet daily x 4 days, Disp: 6 tablet, Rfl: 0    benzonatate (TESSALON PERLES) 100 mg capsule, One or 2 tablets every 8 hours as needed for cough, Disp: 30 capsule, Rfl: 0    cetirizine (ZyrTEC) 10 mg tablet, Take 10 mg by mouth, Disp: , Rfl:     FLUARIX QUADRIVALENT 0 5 ML SHILO, inject 0 5 milliliter intramuscularly, Disp: , Rfl: 0    Current Allergies     Allergies as of 2020 - Reviewed 2020   Allergen Reaction Noted    Prednisone Other (See Comments) 2016    Sulfa antibiotics Hives 2016    Vicodin [hydrocodone-acetaminophen] Anxiety 2016    Codeine Hives 2016    Colistin      Other              The following portions of the patient's history were reviewed and updated as appropriate: allergies, current medications, past family history, past medical history, past social history, past surgical history and problem list      Past Medical History:   Diagnosis Date    Asthma due to seasonal allergies     Kidney stone        Past Surgical History:   Procedure Laterality Date    APPENDECTOMY  10/1982     SECTION      two- [address]    partial    REPLACEMENT TOTAL KNEE BILATERAL      right- , left-        Family History   Problem Relation Age of Onset    Heart disease Father     Hypertension Father     Urolithiasis Mother     Kidney cancer Brother     Urolithiasis Brother     Hepatitis Brother         hep c    Kidney disease Brother     Urolithiasis Sister     Diabetes Sister     Kidney disease Sister     Urolithiasis Daughter          Medications have been verified          Objective   /94   Pulse 82   Temp 98 5 °F (36 9 °C) (Tympanic)   Resp 18   Ht 5' 7" (1 702 m)   Wt 107 kg (235 lb)   LMP  (LMP Unknown)   SpO2 97%   BMI 36 81 kg/m²        Physical Exam Physical Exam   Constitutional: She is oriented to person, place, and time  She appears well-developed and well-nourished  HENT:   Head: Normocephalic and atraumatic  Right Ear: External ear normal    Left Ear: External ear normal    Mouth/Throat: Oropharynx is clear and moist    Bilateral nasal congestion   Neck: Neck supple  Cardiovascular: Normal rate, regular rhythm and normal heart sounds  Pulmonary/Chest: Effort normal and breath sounds normal    Lymphadenopathy:     She has no cervical adenopathy  Neurological: She is alert and oriented to person, place, and time  Skin: Skin is warm and dry  No rash noted  Psychiatric: She has a normal mood and affect  Her behavior is normal    Nursing note and vitals reviewed

## 2020-10-08 ENCOUNTER — TELEPHONE (OUTPATIENT)
Dept: ADMINISTRATIVE | Facility: OTHER | Age: 55
End: 2020-10-08

## 2020-10-12 ENCOUNTER — APPOINTMENT (OUTPATIENT)
Dept: LAB | Facility: CLINIC | Age: 55
End: 2020-10-12
Payer: COMMERCIAL

## 2020-10-12 ENCOUNTER — TRANSCRIBE ORDERS (OUTPATIENT)
Dept: LAB | Facility: CLINIC | Age: 55
End: 2020-10-12

## 2020-10-12 ENCOUNTER — OFFICE VISIT (OUTPATIENT)
Dept: FAMILY MEDICINE CLINIC | Facility: CLINIC | Age: 55
End: 2020-10-12
Payer: COMMERCIAL

## 2020-10-12 VITALS
OXYGEN SATURATION: 98 % | RESPIRATION RATE: 18 BRPM | BODY MASS INDEX: 39.71 KG/M2 | HEIGHT: 67 IN | WEIGHT: 253 LBS | HEART RATE: 72 BPM | TEMPERATURE: 98.5 F | DIASTOLIC BLOOD PRESSURE: 90 MMHG | SYSTOLIC BLOOD PRESSURE: 162 MMHG

## 2020-10-12 DIAGNOSIS — Z11.4 SCREENING FOR HUMAN IMMUNODEFICIENCY VIRUS: ICD-10-CM

## 2020-10-12 DIAGNOSIS — Z28.21 INFLUENZA VACCINATION DECLINED: ICD-10-CM

## 2020-10-12 DIAGNOSIS — Z80.0 FAMILY HISTORY OF COLON CANCER IN MOTHER: ICD-10-CM

## 2020-10-12 DIAGNOSIS — E55.9 VITAMIN D DEFICIENCY: ICD-10-CM

## 2020-10-12 DIAGNOSIS — Z13.0 SCREENING FOR DEFICIENCY ANEMIA: ICD-10-CM

## 2020-10-12 DIAGNOSIS — E66.09 CLASS 2 OBESITY DUE TO EXCESS CALORIES WITHOUT SERIOUS COMORBIDITY WITH BODY MASS INDEX (BMI) OF 39.0 TO 39.9 IN ADULT: ICD-10-CM

## 2020-10-12 DIAGNOSIS — Z13.1 SCREENING FOR DIABETES MELLITUS: ICD-10-CM

## 2020-10-12 DIAGNOSIS — Z12.12 ENCOUNTER FOR COLORECTAL CANCER SCREENING: ICD-10-CM

## 2020-10-12 DIAGNOSIS — R03.0 ELEVATED BLOOD PRESSURE READING WITHOUT DIAGNOSIS OF HYPERTENSION: ICD-10-CM

## 2020-10-12 DIAGNOSIS — Z13.31 DEPRESSION SCREENING NEGATIVE: ICD-10-CM

## 2020-10-12 DIAGNOSIS — Z13.29 SCREENING FOR THYROID DISORDER: ICD-10-CM

## 2020-10-12 DIAGNOSIS — Z13.220 SCREENING FOR HYPERLIPIDEMIA: ICD-10-CM

## 2020-10-12 DIAGNOSIS — Z76.89 ENCOUNTER TO ESTABLISH CARE: Primary | ICD-10-CM

## 2020-10-12 DIAGNOSIS — Z12.11 ENCOUNTER FOR COLORECTAL CANCER SCREENING: ICD-10-CM

## 2020-10-12 LAB
25(OH)D3 SERPL-MCNC: 17 NG/ML (ref 30–100)
ALBUMIN SERPL BCP-MCNC: 4.3 G/DL (ref 3.5–5)
ALP SERPL-CCNC: 92 U/L (ref 46–116)
ALT SERPL W P-5'-P-CCNC: 39 U/L (ref 12–78)
ANION GAP SERPL CALCULATED.3IONS-SCNC: 4 MMOL/L (ref 4–13)
AST SERPL W P-5'-P-CCNC: 11 U/L (ref 5–45)
BASOPHILS # BLD AUTO: 0.07 THOUSANDS/ΜL (ref 0–0.1)
BASOPHILS NFR BLD AUTO: 1 % (ref 0–1)
BILIRUB SERPL-MCNC: 0.41 MG/DL (ref 0.2–1)
BUN SERPL-MCNC: 12 MG/DL (ref 5–25)
CALCIUM SERPL-MCNC: 9.9 MG/DL (ref 8.3–10.1)
CHLORIDE SERPL-SCNC: 108 MMOL/L (ref 100–108)
CHOLEST SERPL-MCNC: 192 MG/DL (ref 50–200)
CO2 SERPL-SCNC: 28 MMOL/L (ref 21–32)
CREAT SERPL-MCNC: 0.69 MG/DL (ref 0.6–1.3)
EOSINOPHIL # BLD AUTO: 0.12 THOUSAND/ΜL (ref 0–0.61)
EOSINOPHIL NFR BLD AUTO: 1 % (ref 0–6)
ERYTHROCYTE [DISTWIDTH] IN BLOOD BY AUTOMATED COUNT: 14.2 % (ref 11.6–15.1)
GFR SERPL CREATININE-BSD FRML MDRD: 99 ML/MIN/1.73SQ M
GLUCOSE P FAST SERPL-MCNC: 102 MG/DL (ref 65–99)
HCT VFR BLD AUTO: 48.7 % (ref 34.8–46.1)
HDLC SERPL-MCNC: 62 MG/DL
HGB BLD-MCNC: 15.3 G/DL (ref 11.5–15.4)
IMM GRANULOCYTES # BLD AUTO: 0.03 THOUSAND/UL (ref 0–0.2)
IMM GRANULOCYTES NFR BLD AUTO: 0 % (ref 0–2)
LDLC SERPL CALC-MCNC: 109 MG/DL (ref 0–100)
LYMPHOCYTES # BLD AUTO: 1.74 THOUSANDS/ΜL (ref 0.6–4.47)
LYMPHOCYTES NFR BLD AUTO: 20 % (ref 14–44)
MCH RBC QN AUTO: 27.6 PG (ref 26.8–34.3)
MCHC RBC AUTO-ENTMCNC: 31.4 G/DL (ref 31.4–37.4)
MCV RBC AUTO: 88 FL (ref 82–98)
MONOCYTES # BLD AUTO: 0.57 THOUSAND/ΜL (ref 0.17–1.22)
MONOCYTES NFR BLD AUTO: 7 % (ref 4–12)
NEUTROPHILS # BLD AUTO: 6.2 THOUSANDS/ΜL (ref 1.85–7.62)
NEUTS SEG NFR BLD AUTO: 71 % (ref 43–75)
NONHDLC SERPL-MCNC: 130 MG/DL
NRBC BLD AUTO-RTO: 0 /100 WBCS
PLATELET # BLD AUTO: 285 THOUSANDS/UL (ref 149–390)
PMV BLD AUTO: 10.1 FL (ref 8.9–12.7)
POTASSIUM SERPL-SCNC: 4.3 MMOL/L (ref 3.5–5.3)
PROT SERPL-MCNC: 7.4 G/DL (ref 6.4–8.2)
RBC # BLD AUTO: 5.55 MILLION/UL (ref 3.81–5.12)
SODIUM SERPL-SCNC: 140 MMOL/L (ref 136–145)
TRIGL SERPL-MCNC: 107 MG/DL
TSH SERPL DL<=0.05 MIU/L-ACNC: 2.88 UIU/ML (ref 0.36–3.74)
WBC # BLD AUTO: 8.73 THOUSAND/UL (ref 4.31–10.16)

## 2020-10-12 PROCEDURE — 80061 LIPID PANEL: CPT

## 2020-10-12 PROCEDURE — 3725F SCREEN DEPRESSION PERFORMED: CPT | Performed by: NURSE PRACTITIONER

## 2020-10-12 PROCEDURE — 36415 COLL VENOUS BLD VENIPUNCTURE: CPT

## 2020-10-12 PROCEDURE — 99203 OFFICE O/P NEW LOW 30 MIN: CPT | Performed by: NURSE PRACTITIONER

## 2020-10-12 PROCEDURE — 85025 COMPLETE CBC W/AUTO DIFF WBC: CPT

## 2020-10-12 PROCEDURE — 84443 ASSAY THYROID STIM HORMONE: CPT

## 2020-10-12 PROCEDURE — 80053 COMPREHEN METABOLIC PANEL: CPT

## 2020-10-12 PROCEDURE — 87389 HIV-1 AG W/HIV-1&-2 AB AG IA: CPT

## 2020-10-12 PROCEDURE — 82306 VITAMIN D 25 HYDROXY: CPT

## 2020-10-12 PROCEDURE — 1036F TOBACCO NON-USER: CPT | Performed by: NURSE PRACTITIONER

## 2020-10-12 RX ORDER — LOSARTAN POTASSIUM 50 MG/1
50 TABLET ORAL DAILY
Qty: 30 TABLET | Refills: 0 | Status: CANCELLED | OUTPATIENT
Start: 2020-10-12

## 2020-10-13 LAB — HIV 1+2 AB+HIV1 P24 AG SERPL QL IA: NORMAL

## 2020-10-24 DIAGNOSIS — Z12.11 SCREENING FOR COLON CANCER: Primary | ICD-10-CM

## 2020-11-06 ENCOUNTER — OFFICE VISIT (OUTPATIENT)
Dept: FAMILY MEDICINE CLINIC | Facility: CLINIC | Age: 55
End: 2020-11-06
Payer: COMMERCIAL

## 2020-11-06 VITALS
HEART RATE: 74 BPM | SYSTOLIC BLOOD PRESSURE: 134 MMHG | TEMPERATURE: 97.6 F | HEIGHT: 67 IN | OXYGEN SATURATION: 97 % | BODY MASS INDEX: 39.87 KG/M2 | RESPIRATION RATE: 18 BRPM | DIASTOLIC BLOOD PRESSURE: 92 MMHG | WEIGHT: 254 LBS

## 2020-11-06 DIAGNOSIS — E55.9 VITAMIN D DEFICIENCY: Primary | ICD-10-CM

## 2020-11-06 DIAGNOSIS — K21.9 GASTROESOPHAGEAL REFLUX DISEASE, UNSPECIFIED WHETHER ESOPHAGITIS PRESENT: ICD-10-CM

## 2020-11-06 PROCEDURE — 99213 OFFICE O/P EST LOW 20 MIN: CPT | Performed by: NURSE PRACTITIONER

## 2020-11-06 PROCEDURE — 1036F TOBACCO NON-USER: CPT | Performed by: NURSE PRACTITIONER

## 2020-11-06 PROCEDURE — 3008F BODY MASS INDEX DOCD: CPT | Performed by: NURSE PRACTITIONER

## 2020-11-06 RX ORDER — ERGOCALCIFEROL 1.25 MG/1
50000 CAPSULE ORAL WEEKLY
Qty: 4 CAPSULE | Refills: 5 | Status: SHIPPED | OUTPATIENT
Start: 2020-11-06 | End: 2022-03-31

## 2021-01-22 ENCOUNTER — OFFICE VISIT (OUTPATIENT)
Dept: FAMILY MEDICINE CLINIC | Facility: CLINIC | Age: 56
End: 2021-01-22
Payer: COMMERCIAL

## 2021-01-22 VITALS
RESPIRATION RATE: 18 BRPM | BODY MASS INDEX: 39.55 KG/M2 | OXYGEN SATURATION: 97 % | HEIGHT: 67 IN | SYSTOLIC BLOOD PRESSURE: 136 MMHG | HEART RATE: 71 BPM | WEIGHT: 252 LBS | DIASTOLIC BLOOD PRESSURE: 86 MMHG | TEMPERATURE: 97.7 F

## 2021-01-22 DIAGNOSIS — R10.13 DYSPEPSIA: ICD-10-CM

## 2021-01-22 DIAGNOSIS — R19.7 DIARRHEA, UNSPECIFIED TYPE: Primary | ICD-10-CM

## 2021-01-22 DIAGNOSIS — E66.09 CLASS 2 OBESITY DUE TO EXCESS CALORIES WITHOUT SERIOUS COMORBIDITY WITH BODY MASS INDEX (BMI) OF 39.0 TO 39.9 IN ADULT: ICD-10-CM

## 2021-01-22 DIAGNOSIS — K21.9 GASTROESOPHAGEAL REFLUX DISEASE WITHOUT ESOPHAGITIS: ICD-10-CM

## 2021-01-22 PROBLEM — E66.812 CLASS 2 OBESITY DUE TO EXCESS CALORIES WITHOUT SERIOUS COMORBIDITY WITH BODY MASS INDEX (BMI) OF 39.0 TO 39.9 IN ADULT: Status: ACTIVE | Noted: 2021-01-22

## 2021-01-22 PROCEDURE — 1036F TOBACCO NON-USER: CPT | Performed by: NURSE PRACTITIONER

## 2021-01-22 PROCEDURE — 99213 OFFICE O/P EST LOW 20 MIN: CPT | Performed by: NURSE PRACTITIONER

## 2021-01-22 PROCEDURE — 3008F BODY MASS INDEX DOCD: CPT | Performed by: NURSE PRACTITIONER

## 2021-01-22 RX ORDER — FAMOTIDINE 20 MG/1
20 TABLET, FILM COATED ORAL 2 TIMES DAILY
Qty: 90 TABLET | Refills: 0 | Status: SHIPPED | OUTPATIENT
Start: 2021-01-22

## 2021-01-22 NOTE — PATIENT INSTRUCTIONS
January 22, 2021     Manasa Arenas    Dear Ms Moore Other: Thank you for contacting us! Our commitment to you is to keep you informed  [place] to let you know when its your turn for the COVID-19 vaccine  Please go under your questionnaires tab in [employer] (health tab on the web, questionnaire button on the susan ) and complete the [employer] questionnaire  This will help us communicate with you when vaccine spots are available for your phase  Please let us know if you have any further questions or concerns  Sincerely,    [facility]'s Patient Technical Services Desk    Additional Information:  If you have questions, call [phone] ([phone]) choose option 5 to talk to our customer support staff  Remember, [employer] is NOT to be used for urgent needs  For medical emergencies, dial 911  COVID Vaccine updates    We are committed to getting you vaccinated as soon as possible and will be closely following CDC and [facility]  guidelines as they are released and revised  Please refer to our COVID-19 vaccine webpage  www [email or web address] for the most up to date information on the vaccine and our distribution efforts  You can PRE-register for the COVID-19 Vaccine via hospitals & [facility]! Download Mychart/setup Mychart on computer    1) Log into [employer]  2) Click on [employer] icon on bottom of page  3) Click on the Questionnaire icon  4) Select COVID-19 Vaccine pre-reg  5) Select what phase you fall under  6) Click submit  7) You will be notified when it's your turn! Nutrition Tips for Relief of Diarrhea   WHAT YOU NEED TO KNOW:   There are diet changes you can make to help relieve or stop diarrhea  These changes include limiting or avoiding foods and liquids that are high in sugar, fat, fiber, and lactose  Lactose is a sugar found in milk products  Milk products can cause diarrhea in people who are lactose intolerant   You should also drink extra liquids to replace fluids that are lost when you have diarrhea  Diarrhea can lead to dehydration  DISCHARGE INSTRUCTIONS:   Foods to limit or avoid:   · Dairy:      ? Whole milk    ? Half-and-half, cream, and sour cream    ? Regular (whole milk) ice cream    · Grains:      ? Whole wheat and whole grain breads, pasta, cereals, and crackers    ? Sherolyn Romance and wild rice    ? Breads and cereals with seeds or nuts    ? Popcorn    · Fruit and vegetables:      ? All raw fruits, except bananas and melon    ? Dried fruits, including prunes and raisins    ? Canned fruit in heavy syrup    ? Prune juice and any fruit juice with pulp    ? Raw vegetables, except lettuce     ? Fried vegetables    ? Corn, raw and cooked broccoli, cabbage, cauliflower, and shala greens    · Protein:      ? Fried meat, poultry, and fish    ? High-fat luncheon meats, such as bologna    ? Fatty meats, such as sausage, zhao, and hot dogs    ? Beans and nuts    · Liquids:      ? Sodas and fruit-flavored drinks    ? Drinks that contain caffeine, such as energy drinks, coffee, and tea     ? Drinks that contain alcohol or sugar alcohol, such as sorbitol    Foods and liquids you may eat or drink:  Most people can tolerate the foods and liquids listed below  If any of them make your symptoms worse, stop eating or drinking them until you feel better  If you are lactose intolerant, avoid milk products  · Dairy:      ? Skim or low-fat milk or evaporated milk    ? Soy milk or buttermilk     ? Low-fat, part-skim, and aged cheese    ? Yogurt, low-fat ice cream, or sherbert    · Grains:  (Choose foods with less than 2 grams of dietary fiber per serving )     ? White or refined flour breads, bagels, pasta, and crackers    ? Cold or hot cereals made from white or refined flour such as puffed rice, cornflakes, or cream of wheat    ? White rice    · Fruit and vegetables:      ? Bananas or melon    ? Fruit juice without pulp, except prune juice    ? Canned fruit in juice or light syrup    ?  Lettuce and most well-cooked vegetables without seeds or skins     ? Strained vegetable juice    · Protein:      ? Tender, well-cooked meat, poultry, or fish    ? Well-cooked eggs or soy foods (cooked without added fat)    ? Smooth nut butters    · Fats:  (Limit fats to less than 8 teaspoons a day)     ? Oil, butter, or margarine, or mayonnaise    ? Cream cheese or salad dressings    · Liquids:      ? For infants, breast milk or formula    ? Oral rehydration solution     ? Decaffeinated coffee or caffeine-free teas    ? Soft drinks without caffeine    Other guidelines to follow:   · Drink liquids as directed  You may need to drink more liquids than usual to prevent dehydration  Ask how much liquid to drink each day and which liquids are best for you  You may need to drink an oral rehydration solution (ORS)  An ORS helps replace fluids and electrolytes that you lose when you have diarrhea  · Eat small meals or snacks every 3 to 4 hours  instead of large meals  Continue eating even if you still have diarrhea  Your diarrhea will continue for a few days but should gradually go away  © Copyright [address] Information is for End User's use only and may not be sold, redistributed or otherwise used for commercial purposes  All illustrations and images included in CareNotes® are the copyrighted property of [employer]  or [facility] Natalie Grayson   The above information is an  only  It is not intended as medical advice for individual conditions or treatments  Talk to your doctor, nurse or pharmacist before following any medical regimen to see if it is safe and effective for you

## 2021-01-22 NOTE — PROGRESS NOTES
Assessment/Plan:    Problem List Items Addressed This Visit     Esophageal reflux    Relevant Medications    famotidine (PEPCID) 20 mg tablet    Class 2 obesity due to excess calories without serious comorbidity with body mass index (BMI) of 39 0 to 39 9 in adult      Other Visit Diagnoses     Diarrhea, unspecified type    -  Primary    Relevant Orders    Stool culture    White Blood Cells, Stool by Gram Stain    Clostridium difficile toxin by PCR    Ova and parasite examination    H  pylori antigen, stool    Dyspepsia        Relevant Medications    famotidine (PEPCID) 20 mg tablet    Other Relevant Orders    H  pylori antigen, stool           Diagnoses and all orders for this visit:    Diarrhea, unspecified type  -     Stool culture; Future  -     White Blood Cells, Stool by Gram Stain; Future  -     Clostridium difficile toxin by PCR; Future  -     Ova and parasite examination; Future  -     H  pylori antigen, stool; Future    Dyspepsia  -     H  pylori antigen, stool; Future  -     famotidine (PEPCID) 20 mg tablet; Take 1 tablet (20 mg total) by mouth 2 (two) times a day    Gastroesophageal reflux disease without esophagitis  -     famotidine (PEPCID) 20 mg tablet; Take 1 tablet (20 mg total) by mouth 2 (two) times a day    Class 2 obesity due to excess calories without serious comorbidity with body mass index (BMI) of 39 0 to 39 9 in adult    Other orders  -     Probiotic Product (PROBIOTIC-10 PO); Take by mouth        No problem-specific Assessment & Plan notes found for this encounter  Subjective:      Patient ID: Vira Argueta is a 48 y o  female  Vira Argueta presents with diarrhea and excessive burping  She states that she has had this before and was told it was gastritis and would clear up in a week or so  Patient states last occurrence was in October 2020  Patient states that this occurrence has been going on for the past 2 weeks    If she has tried a bland diet, and Imodium which has not been effective in relieving her diarrhea/belching  In the past she states that she used to take Prilosec for reflux  Diarrhea   The current episode started 1 to 4 weeks ago  The problem occurs 2 to 4 times per day  The problem has been unchanged  The patient states that diarrhea does not awaken (but does keep her from sleeping) her from sleep  Pertinent negatives include no abdominal pain, arthralgias, bloating, chills, coughing, fever, headaches, increased  flatus, myalgias, sweats, URI, vomiting or weight loss  Nothing aggravates the symptoms  There are no known risk factors  She has tried anti-motility drug, increased fluids and change of diet for the symptoms  The treatment provided no relief  There is no history of bowel resection, inflammatory bowel disease, irritable bowel syndrome, malabsorption, a recent abdominal surgery or short gut syndrome  The following portions of the patient's history were reviewed and updated as appropriate:   She has a past medical history of Asthma due to seasonal allergies and Kidney stone  ,  does not have any pertinent problems on file  ,   has a past surgical history that includes Hysterectomy ();  section; Replacement total knee bilateral; and Appendectomy (10/1982)  ,  family history includes Diabetes in her sister; Heart disease in her father; Hepatitis in her brother; Hypertension in her father; Kidney cancer in her brother; Kidney disease in her brother and sister; Urolithiasis in her brother, daughter, mother, and sister  ,   reports that she has never smoked  She has never used smokeless tobacco  She reports current alcohol use  She reports that she does not use drugs  ,  is allergic to prednisone; sulfa antibiotics; vicodin [hydrocodone-acetaminophen]; codeine; colistin; and other     Current Outpatient Medications   Medication Sig Dispense Refill    Albuterol Sulfate (VENTOLIN HFA IN) Inhale      cetirizine (ZyrTEC) 10 mg tablet Take 10 mg by mouth      clotrimazole-betamethasone (LOTRISONE) 1-0 05 % cream Apply topically 2 (two) times a day      CRANBERRY PO Take by mouth 2 (two) times a day      ergocalciferol (VITAMIN D2) 50,000 units Take 1 capsule (50,000 Units total) by mouth once a week 4 capsule 5    Probiotic Product (PROBIOTIC-10 PO) Take by mouth      benzonatate (TESSALON PERLES) 100 mg capsule One or 2 tablets every 8 hours as needed for cough (Patient not taking: Reported on 11/6/2020) 30 capsule 0    famotidine (PEPCID) 20 mg tablet Take 1 tablet (20 mg total) by mouth 2 (two) times a day 90 tablet 0    FLUARIX QUADRIVALENT 0 5 ML SHILO inject 0 5 milliliter intramuscularly  0     No current facility-administered medications for this visit  BMI Counseling: Body mass index is 39 47 kg/m²  The BMI is above normal  Nutrition recommendations include decreasing portion sizes, encouraging healthy choices of fruits and vegetables, decreasing fast food intake, consuming healthier snacks, limiting drinks that contain sugar, moderation in carbohydrate intake, increasing intake of lean protein, reducing intake of saturated and trans fat and reducing intake of cholesterol  Exercise recommendations include moderate physical activity 150 minutes/week, exercising 3-5 times per week and strength training exercises  No pharmacotherapy was ordered  Review of Systems   Constitutional: Negative for chills, fever and weight loss  Respiratory: Negative for cough  Gastrointestinal: Positive for diarrhea  Negative for abdominal pain, bloating, flatus and vomiting  Belching   Musculoskeletal: Negative for arthralgias and myalgias  Neurological: Negative for headaches  All other systems reviewed and are negative  Objective:  Vitals:    01/22/21 1447   BP: 136/86   Pulse: 71   Resp: 18   Temp: 97 7 °F (36 5 °C)   SpO2: 97%   Weight: 114 kg (252 lb)   Height: 5' 7" (1 702 m)     Body mass index is 39 47 kg/m²  Physical Exam  Vitals signs and nursing note reviewed  Constitutional:       Appearance: Normal appearance  She is well-developed  She is obese  HENT:      Head: Normocephalic and atraumatic  Right Ear: Tympanic membrane, ear canal and external ear normal       Left Ear: Tympanic membrane, ear canal and external ear normal       Nose: Nose normal       Mouth/Throat:      Mouth: Mucous membranes are moist       Pharynx: Uvula midline  Eyes:      General: Lids are normal       Conjunctiva/sclera: Conjunctivae normal       Pupils: Pupils are equal, round, and reactive to light  Neck:      Musculoskeletal: Full passive range of motion without pain, normal range of motion and neck supple  Thyroid: No thyroid mass or thyromegaly  Vascular: No JVD  Trachea: Trachea and phonation normal    Cardiovascular:      Rate and Rhythm: Normal rate and regular rhythm  Pulses: Normal pulses  Heart sounds: Normal heart sounds, S1 normal and S2 normal  No murmur  No friction rub  No gallop  Pulmonary:      Effort: Pulmonary effort is normal       Breath sounds: Normal breath sounds  Abdominal:      General: Bowel sounds are increased  Palpations: Abdomen is soft  Tenderness: There is no abdominal tenderness  Genitourinary:     Comments: Deferred   Musculoskeletal: Normal range of motion  Right lower leg: No edema  Left lower leg: No edema  Skin:     General: Skin is warm and dry  Capillary Refill: Capillary refill takes less than 2 seconds  Neurological:      General: No focal deficit present  Mental Status: She is alert and oriented to person, place, and time  Cranial Nerves: Cranial nerves are intact  No cranial nerve deficit  Sensory: Sensation is intact  Motor: Motor function is intact  Coordination: Coordination is intact  Gait: Gait is intact  Deep Tendon Reflexes: Reflexes are normal and symmetric     Psychiatric: Attention and Perception: Attention and perception normal          Mood and Affect: Mood and affect normal          Speech: Speech normal          Behavior: Behavior normal  Behavior is cooperative  Thought Content:  Thought content normal          Cognition and Memory: Cognition normal          Judgment: Judgment normal

## 2021-03-26 ENCOUNTER — OFFICE VISIT (OUTPATIENT)
Dept: URGENT CARE | Facility: CLINIC | Age: 56
End: 2021-03-26
Payer: COMMERCIAL

## 2021-03-26 VITALS
SYSTOLIC BLOOD PRESSURE: 134 MMHG | HEART RATE: 72 BPM | DIASTOLIC BLOOD PRESSURE: 82 MMHG | TEMPERATURE: 98 F | BODY MASS INDEX: 41.3 KG/M2 | OXYGEN SATURATION: 99 % | RESPIRATION RATE: 20 BRPM | HEIGHT: 66 IN | WEIGHT: 257 LBS

## 2021-03-26 DIAGNOSIS — N39.0 URINARY TRACT INFECTION WITH HEMATURIA, SITE UNSPECIFIED: Primary | ICD-10-CM

## 2021-03-26 DIAGNOSIS — R35.0 URINARY FREQUENCY: ICD-10-CM

## 2021-03-26 DIAGNOSIS — R31.9 URINARY TRACT INFECTION WITH HEMATURIA, SITE UNSPECIFIED: Primary | ICD-10-CM

## 2021-03-26 LAB
SL AMB  POCT GLUCOSE, UA: NEGATIVE
SL AMB LEUKOCYTE ESTERASE,UA: ABNORMAL
SL AMB POCT BILIRUBIN,UA: NEGATIVE
SL AMB POCT BLOOD,UA: ABNORMAL
SL AMB POCT CLARITY,UA: ABNORMAL
SL AMB POCT COLOR,UA: YELLOW
SL AMB POCT KETONES,UA: NEGATIVE
SL AMB POCT NITRITE,UA: POSITIVE
SL AMB POCT PH,UA: 6
SL AMB POCT SPECIFIC GRAVITY,UA: 1.01
SL AMB POCT URINE PROTEIN: NEGATIVE
SL AMB POCT UROBILINOGEN: 0.2

## 2021-03-26 PROCEDURE — 87086 URINE CULTURE/COLONY COUNT: CPT | Performed by: PHYSICIAN ASSISTANT

## 2021-03-26 PROCEDURE — 99213 OFFICE O/P EST LOW 20 MIN: CPT | Performed by: PHYSICIAN ASSISTANT

## 2021-03-26 PROCEDURE — 87186 SC STD MICRODIL/AGAR DIL: CPT | Performed by: PHYSICIAN ASSISTANT

## 2021-03-26 PROCEDURE — 87077 CULTURE AEROBIC IDENTIFY: CPT | Performed by: PHYSICIAN ASSISTANT

## 2021-03-26 RX ORDER — CIPROFLOXACIN 500 MG/1
500 TABLET, FILM COATED ORAL EVERY 12 HOURS SCHEDULED
Qty: 14 TABLET | Refills: 0 | Status: SHIPPED | OUTPATIENT
Start: 2021-03-26 | End: 2021-04-02

## 2021-03-26 NOTE — PATIENT INSTRUCTIONS
Urinary Tract Infection in Women   AMBULATORY CARE:   A urinary tract infection (UTI)  is caused by bacteria that get inside your urinary tract  Most bacteria that enter your urinary tract come out when you urinate  If the bacteria stay in your urinary tract, you may get an infection  Your urinary tract includes your kidneys, ureters, bladder, and urethra  Urine is made in your kidneys, and it flows from the ureters to the bladder  Urine leaves the bladder through the urethra  A UTI is more common in your lower urinary tract, which includes your bladder and urethra  Common symptoms include the following:   · Urinating more often or waking from sleep to urinate    · Pain or burning when you urinate    · Pain or pressure in your lower abdomen     · Urine that smells bad    · Blood in your urine    · Leaking urine    Seek care immediately if:   · You are urinating very little or not at all  · You have a high fever with shaking chills  · You have side or back pain that gets worse  Call your doctor if:   · You have a fever  · You do not feel better after 2 days of taking antibiotics  · You are vomiting  · You have questions or concerns about your condition or care  Treatment for a UTI  may include antibiotics to treat a bacterial infection  You may also need medicines to decrease pain and burning, or decrease the urge to urinate often  If you have UTIs often (called recurrent UTIs), you may be given antibiotics to take regularly  You will be given directions for when and how to use antibiotics  The goal is to prevent UTIs but not cause antibiotic resistance by using antibiotics too often  Prevent a UTI:   · Empty your bladder often  Urinate and empty your bladder as soon as you feel the need  Do not hold your urine for long periods of time  · Wipe from front to back after you urinate or have a bowel movement    This will help prevent germs from getting into your urinary tract through your urethra  · Drink liquids as directed  Ask how much liquid to drink each day and which liquids are best for you  You may need to drink more liquids than usual to help flush out the bacteria  Do not drink alcohol, caffeine, or citrus juices  These can irritate your bladder and increase your symptoms  Your healthcare provider may recommend cranberry juice to help prevent a UTI  · Urinate after you have sex  This can help flush out bacteria passed during sex  · Do not douche or use feminine deodorants  These can change the chemical balance in your vagina  · Change sanitary pads or tampons often  This will help prevent germs from getting into your urinary tract  · Talk to your healthcare provider about your birth control method  You may need to change your method if it is increasing your risk for UTIs  · Wear cotton underwear and clothes that are loose  Tight pants and nylon underwear can trap moisture and cause bacteria to grow  · Vaginal estrogen may be recommended  This medicine helps prevent UTIs in women who have gone through menopause or are in shon-menopause  · Do pelvic muscle exercises often  Pelvic muscle exercises may help you start and stop urinating  Strong pelvic muscles may help you empty your bladder easier  Squeeze these muscles tightly for 5 seconds like you are trying to hold back urine  Then relax for 5 seconds  Gradually work up to squeezing for 10 seconds  Do 3 sets of 15 repetitions a day, or as directed  Follow up with your healthcare provider as directed:  Write down your questions so you remember to ask them during your visits  © Copyright [address] Information is for End User's use only and may not be sold, redistributed or otherwise used for commercial purposes  All illustrations and images included in CareNotes® are the copyrighted property of [employer]  or [facility] Natalie Grayson   The above information is an  only   It is not intended as medical advice for individual conditions or treatments  Talk to your doctor, nurse or pharmacist before following any medical regimen to see if it is safe and effective for you

## 2021-03-26 NOTE — PROGRESS NOTES
[facility]        NAME: Pau Alva is a 48 y o  female  : 1965    MRN: [id number]  DATE: 2021  TIME: 2:02 PM    Assessment and Plan   Urinary tract infection with hematuria, site unspecified [N39 0, R31 9]  1  Urinary tract infection with hematuria, site unspecified  ciprofloxacin (CIPRO) 500 mg tablet   2  Urinary frequency  POCT urine dip auto non-scope    Urine culture         Patient Instructions       Follow up with PCP in 3-5 days  Proceed to  ER if symptoms worsen  Chief Complaint     Chief Complaint   Patient presents with    Possible UTI     urinary frequency x 7 days , took several doese of keflex 5 days ago         History of Present Illness         Patient started with urinary frequency 7 days ago  Took cranberry pills increase fluids and took several doses of Keflex which relieved her symptoms  Last evening she began with urinary frequency and blood in her urine  She denies any abdominal pain flank pain fever chills  She did feel somewhat bloated  Review of Systems   Review of Systems   Constitutional: Negative for chills and fever  Genitourinary: Positive for frequency and hematuria  Negative for difficulty urinating, dysuria, flank pain and urgency  Musculoskeletal: Negative for back pain           Current Medications       Current Outpatient Medications:     cetirizine (ZyrTEC) 10 mg tablet, Take 10 mg by mouth, Disp: , Rfl:     CRANBERRY PO, Take by mouth 2 (two) times a day, Disp: , Rfl:     ergocalciferol (VITAMIN D2) 50,000 units, Take 1 capsule (50,000 Units total) by mouth once a week, Disp: 4 capsule, Rfl: 5    Probiotic Product (PROBIOTIC-10 PO), Take by mouth, Disp: , Rfl:     Albuterol Sulfate (VENTOLIN HFA IN), Inhale, Disp: , Rfl:     benzonatate (TESSALON PERLES) 100 mg capsule, One or 2 tablets every 8 hours as needed for cough (Patient not taking: Reported on 2020), Disp: 30 capsule, Rfl: 0    ciprofloxacin (CIPRO) 500 mg tablet, Take 1 tablet (500 mg total) by mouth every 12 (twelve) hours for 7 days, Disp: 14 tablet, Rfl: 0    clotrimazole-betamethasone (LOTRISONE) 1-0 05 % cream, Apply topically 2 (two) times a day, Disp: , Rfl:     famotidine (PEPCID) 20 mg tablet, Take 1 tablet (20 mg total) by mouth 2 (two) times a day (Patient not taking: Reported on 3/26/2021), Disp: 90 tablet, Rfl: 0    FLUARIX QUADRIVALENT 0 5 ML SHILO, inject 0 5 milliliter intramuscularly, Disp: , Rfl: 0    Current Allergies     Allergies as of 2021 - Reviewed 2021   Allergen Reaction Noted    Prednisone Other (See Comments) 2016    Sulfa antibiotics Hives 2016    Vicodin [hydrocodone-acetaminophen] Anxiety 2016    Codeine Hives 2016    Colistin      Other              The following portions of the patient's history were reviewed and updated as appropriate: allergies, current medications, past family history, past medical history, past social history, past surgical history and problem list      Past Medical History:   Diagnosis Date    Asthma due to seasonal allergies     Kidney stone        Past Surgical History:   Procedure Laterality Date    APPENDECTOMY  10/1982     SECTION      two- [address]    partial    REPLACEMENT TOTAL KNEE BILATERAL      right- , left-        Family History   Problem Relation Age of Onset    Heart disease Father     Hypertension Father     Urolithiasis Mother     Kidney cancer Brother     Urolithiasis Brother     Hepatitis Brother         hep c    Kidney disease Brother     Urolithiasis Sister     Diabetes Sister     Kidney disease Sister     Urolithiasis Daughter          Medications have been verified          Objective   /82 (BP Location: Left arm, Patient Position: Sitting, Cuff Size: Extra-Large)   Pulse 72   Temp 98 °F (36 7 °C)   Resp 20   Ht 5' 6" (1 676 m)   Wt 117 kg (257 lb)   LMP  (LMP Unknown)   SpO2 99% BMI 41 48 kg/m²   No LMP recorded (lmp unknown)  Patient has had a hysterectomy  Physical Exam     Physical Exam  Vitals signs and nursing note reviewed  Constitutional:       Appearance: Normal appearance  HENT:      Head: Normocephalic and atraumatic  Cardiovascular:      Rate and Rhythm: Normal rate and regular rhythm  Pulmonary:      Effort: Pulmonary effort is normal    Abdominal:      Comments: No CVA tenderness  Skin:     General: Skin is warm  Neurological:      Mental Status: She is alert

## 2021-03-28 LAB — BACTERIA UR CULT: ABNORMAL

## 2021-05-12 ENCOUNTER — AMB VIDEO VISIT (OUTPATIENT)
Dept: OTHER | Facility: HOSPITAL | Age: 56
End: 2021-05-12

## 2021-05-12 PROCEDURE — ECARE PR SL URGENT CARE VIRTUAL VISIT: Performed by: FAMILY MEDICINE

## 2021-05-12 NOTE — CARE ANYWHERE EVISITS
Visit Summary for Susanne Ellington - Gender: Female - Date of Birth: [id number]  Date: [id number] - Duration: 9 minutes  Patient: Susanne Larios   [facility]  Provider: Bhavana Zhang    Patient Contact Information  Address  [address] [place]; [place] [address]  [phone]    Visit Topics  Sinus infection from allergies  [Added By: Self - 2021-05-12]    Triage Questions   What is your current physical address in the event of a medical emergency? Answer []  Are you allergic to any medications? Answer []  Are you now or could you be pregnant? Answer []  Do you have any immune system compromise or chronic lung   disease? Answer []  Do you have any vulnerable family members in the home (infant, pregnant, cancer, elderly)? Answer []     Conversation Transcripts  [0A][0A] [Notification] Jennifer Burnette, Global Staff, will help you prepare for your visit  He is assisting Bhavana Zhang, Family Physician [0A][Tutu Cerda] Kirsten, and thank you for connecting  Iâm going to check if there is a provider who can see   you more quickly  If so, would you like to be transferred? [0A][Tutu Cerda] Abiodunht I'll work on that  Keep in mind if I don't transfer you, it is because the current doctor you are with has the shortest estimated wait time for you [0A][Notification]   Jennifer Burnette has left the room  [0A][Notification] You are connected with Bhavana Zhang, Family Physician [0A][Notification] Jessie Taylor is located in [place]  [0A][Notification] Jessie Taylor has shared health history  Latoya Prows  [0A][Notification]   Bhavana Zhang has added a diagnosis/procedure code  [0A][Notification] Bhavana Zhang has added a prescription [0A][Notification] Bhavana Zhang has added a prescription  [0A]    Diagnosis  Acute sinusitis, unspecified    Procedures    Medications Prescribed    Augmentin  Dose : 1 tablet  Route : oral  Frequency : every 12 hours  Until directed to stop     Patient Instructions : 1 tab q 12 hrs x 10 days  Refills : 0  Instructions to the Pharmacist : Substitutions allowed    Flonase Allergy Relief  Dose : 2 sprays  Route : nasal  Frequency : every day  Until directed to stop  Patient Instructions : each nostril  Refills : 0  Instructions to the Pharmacist : Substitutions allowed      Provider Notes  [0A][0A] pa, confirmed[0A]:  [0A]1965mobile  [0A]TOPICS:Sinus infection from allergies[0A]began  last wknd , worsening[0A]head cold, sinus pain + pressure worsening, severe nasal congestion in head, PND thick mucus, scratchy  sore   throat/scratchy, no cough, no sob, no wheeze or chest discomfort  no F, No chills, sweats BA, rx;  sudafed, ibuprofen mild relief  [0A]NAD, HA and sinus pressure w/ nasal congestion, worsening, severe RAD to cheeks and fh  severe, sore throat, ln ttp,   breathing and speaking comfortably[0A]ALLERGIES: bacitracin/sulfa, narcotics, oral prednisone -  HA/migraine/tachycardia, pollen - hives[0A]DAILY MEDS/SUPPLEMENTS: zyrtec, ventolin prn, flonase, mv, vit d, probioitc[0A]PMH: AR, vit d def[0A]covid j+j 8   wks ago[0A]SHX:[0A]not pregnant or bf[0A]Diagnosis:[0A]sinusitis, acute: allergic vs viral vs bacterial, most likely[0A]AR, well managed[0A]low risk covid[0A]Treatment:[0A]rest[0A]steam inhalation[0A]nasal saline as directed as needed[0A]salt   gargles[0A]increase fluid intake[0A]humidifier[0A]air purifier[0A]flonase, 2 jetts each nostril in the am[0A]tylenol  or ibuprofen as directed as needed for fever or body aches[0A]augmentin, as directed, please complete entire course[0A]Daily yogurt or   probioitc for at least 3-6 months to replace the digestive bacteria lost to antibiotic use  [0A]ALL antibioitcs can increase your risk for a yeast infection, may take any over the counter anti fungal regimen as directed as needed or may reconnect for   other treatment options   [0A]Patient agrees to please follow up with your Primary Care Physician for full evaluation, and additional treatment if not improving or worsening  [0A]Please reconnect or send a secure message anytime, as needed  [0A]Please   send me a secure message (click the envelope on the right side of the screen) letting me know how you are doing in 3-4 days, or after the treatment has been completed  [0A] [0A]Thank you for using [facility]  It was a pleasure speaking with you  I   look forward to seeing you again for any future medical needs  [0A] [0A]Please print a copy of this note and send it to your regular doctor, or take it to your next visit so it may be included in your medical record  [0A] [0A]If you need to speak to   customer support, please call the following number: [0A][facility]: [phone]  [0A] [0A]And for pharmacy related issues, the number is [phone]  [0A]Thanks for consulting with me, I hope you will be feeling better soon! [0A]    Electronically signed by:  Magalys Hammond(NPI [id number])

## 2021-07-13 ENCOUNTER — OFFICE VISIT (OUTPATIENT)
Dept: GASTROENTEROLOGY | Facility: MEDICAL CENTER | Age: 56
End: 2021-07-13
Payer: COMMERCIAL

## 2021-07-13 VITALS
HEART RATE: 69 BPM | WEIGHT: 259 LBS | BODY MASS INDEX: 41.62 KG/M2 | HEIGHT: 66 IN | SYSTOLIC BLOOD PRESSURE: 141 MMHG | TEMPERATURE: 97 F | DIASTOLIC BLOOD PRESSURE: 91 MMHG

## 2021-07-13 DIAGNOSIS — Z80.0 FAMILY HISTORY OF COLON CANCER: Primary | ICD-10-CM

## 2021-07-13 PROCEDURE — 99203 OFFICE O/P NEW LOW 30 MIN: CPT | Performed by: INTERNAL MEDICINE

## 2021-07-13 PROCEDURE — 1036F TOBACCO NON-USER: CPT | Performed by: INTERNAL MEDICINE

## 2021-07-13 RX ORDER — FLUTICASONE PROPIONATE 50 MCG
SPRAY, SUSPENSION (ML) NASAL
COMMUNITY
Start: 2021-05-12 | End: 2022-03-31

## 2021-07-13 RX ORDER — AMOXICILLIN AND CLAVULANATE POTASSIUM 875; 125 MG/1; MG/1
TABLET, FILM COATED ORAL
COMMUNITY
Start: 2021-05-12 | End: 2022-03-31

## 2021-07-13 RX ORDER — SODIUM, POTASSIUM,MAG SULFATES 17.5-3.13G
SOLUTION, RECONSTITUTED, ORAL ORAL
Qty: 177 ML | Refills: 0 | Status: SHIPPED | OUTPATIENT
Start: 2021-07-13 | End: 2022-03-31

## 2021-07-13 NOTE — PATIENT INSTRUCTIONS
The patient is scheduled at [facility] for a colon with Dr Krystle Renteria on 7/21/21  Suprep prep instructions have been gone over in the office, with the patient, by the MA  The patient is aware that they will receive a call with the arrival time the day prior to procedure and that they will need a  the day of the procedure   I have asked the patient to call with any questions that they might have prior to procedure

## 2021-07-13 NOTE — H&P (VIEW-ONLY)
[facility] - Outpatient Consultation  Lulu Brothers 55 y o  female MRN: [id number]  Encounter: [id number]          ASSESSMENT AND PLAN:  52-year-old female with history of seasonal allergies presents for evaluation     1  Family history of colon cancer  She underwent Cologuard testing last year which was negative  She notes family history of her mother diagnosed with colorectal cancer at age 80 and was diagnosed at an advanced stage  I discussed that Cologuard for screening is not ideal for a patient was not at average risk including those with a history of polyps or family history of colon cancer  She is currently without GI complaints  Given her family history and that she has had no colonoscopy in the past I recommend she undergo colonoscopy for screening purposes  I discussed the indication, risk and benefit of colonoscopy for colon cancer screening with her today  Informed consent was obtained for the procedure  Risks of infection, perforation and hemorrhage were discussed  The patient was agreeable to proceed with the procedure  - Colonoscopy; Future  - Na Sulfate-K Sulfate-Mg Sulf (Suprep Bowel Prep Kit) 17 5-3 13-1 6 GM/177ML SOLN; Take as instructed on bowel preparation instructions  Dispense: 177 mL; Refill: 0      ______________________________________________________________________    HPI:  52-year-old female with history of seasonal allergies presents for evaluation  She reports no prior colonoscopy in the past   She underwent Cologuard testing recently which was negative  She notes family history of her mother diagnosed with colorectal cancer at the end of last year requiring surgical resection  She also notes sister diagnosed with precancerous polyps in her mid 62s  She is currently without GI complaints and denies abdominal pain, nausea or vomiting  She has regular, formed bowel movements without melena or hematochezia      She takes no anti-platelet agents   Past GI surgical history includes partial hysterectomy, appendectomy        REVIEW OF SYSTEMS:    CONSTITUTIONAL: Denies any fever, chills, rigors, and weight loss  HEENT: No earache or tinnitus  Denies hearing loss or visual disturbances  CARDIOVASCULAR: No chest pain or palpitations  RESPIRATORY: Denies any cough, hemoptysis, shortness of breath or dyspnea on exertion  GASTROINTESTINAL: As noted in the History of Present Illness  GENITOURINARY: No problems with urination  Denies any hematuria or dysuria  NEUROLOGIC: No dizziness or vertigo, denies headaches  MUSCULOSKELETAL: Denies any muscle or joint pain  SKIN: Denies skin rashes or itching  ENDOCRINE: Denies excessive thirst  Denies intolerance to heat or cold  PSYCHOSOCIAL: Denies depression or anxiety  Denies any recent memory loss         Historical Information   Past Medical History:   Diagnosis Date    Asthma due to seasonal allergies     Kidney stone      Past Surgical History:   Procedure Laterality Date    APPENDECTOMY  10/1982     SECTION      two-  &     HYSTERECTOMY      partial    REPLACEMENT TOTAL KNEE BILATERAL      right- , left-      Social History   Social History     Substance and Sexual Activity   Alcohol Use Yes    Comment: occasional     Social History     Substance and Sexual Activity   Drug Use No     Social History     Tobacco Use   Smoking Status Never Smoker   Smokeless Tobacco Never Used     Family History   Problem Relation Age of Onset    Heart disease Father     Hypertension Father     Urolithiasis Mother     Colon cancer Mother     Kidney cancer Brother     Urolithiasis Brother     Hepatitis Brother         hep c    Kidney disease Brother     Urolithiasis Sister     Diabetes Sister     Kidney disease Sister     Urolithiasis Daughter        Meds/Allergies       Current Outpatient Medications:     cetirizine (ZyrTEC) 10 mg tablet   clotrimazole-betamethasone (LOTRISONE) 1-0 05 % cream    CRANBERRY PO    Albuterol Sulfate (VENTOLIN HFA IN)    amoxicillin-clavulanate (AUGMENTIN) 875-125 mg per tablet    benzonatate (TESSALON PERLES) 100 mg capsule    ergocalciferol (VITAMIN D2) 50,000 units    famotidine (PEPCID) 20 mg tablet    FLUARIX QUADRIVALENT 0 5 ML SHILO    fluticasone (FLONASE) 50 mcg/act nasal spray    Probiotic Product (PROBIOTIC-10 PO)    Allergies   Allergen Reactions    Prednisone Other (See Comments)     Headaches and blood pressure spikes      Sulfa Antibiotics Hives    Vicodin [Hydrocodone-Acetaminophen] Anxiety    Codeine Hives    Colistin      Other reaction(s): Other (See Comments)  colymycin otic    Other      Other reaction(s): Other (See Comments)  burn           Objective     Blood pressure 141/91, pulse 69, temperature (!) 97 °F (36 1 °C), temperature source Tympanic, height 5' 6" (1 676 m), weight 118 kg (259 lb 9 6 oz)  Body mass index is 41 9 kg/m²  PHYSICAL EXAM:      General Appearance:   Alert, cooperative, no distress   HEENT:   Normocephalic, atraumatic, anicteric  Neck:  Supple, symmetrical, trachea midline   Lungs:   Clear to auscultation bilaterally; no rales, rhonchi or wheezing; respirations unlabored    Heart[de-identified]   Regular rate and rhythm; no murmur, rub, or gallop  Abdomen:   Soft, non-tender, non-distended; normal bowel sounds; no masses, no organomegaly    Genitalia:   Deferred    Rectal:   Deferred    Extremities:  No cyanosis, clubbing or edema    Pulses:  2+ and symmetric    Skin:  No jaundice, rashes, or lesions    Lymph nodes:  No palpable cervical lymphadenopathy        Lab Results:   No visits with results within 1 Day(s) from this visit     Latest known visit with results is:   Office Visit on 03/26/2021   Component Date Value     COLOR, 03/26/2021 yellow     CLARITY,UA 03/26/2021 cloudy     SPECIFIC GRAVITY,UA 03/26/2021 1 010      PH,UA 03/26/2021 6 0     LEUKOCYTE ESTERASE,UA 03/26/2021 large     NITRITE,UA 03/26/2021 positive     GLUCOSE, UA 03/26/2021 negative     KETONES,UA 03/26/2021 negative     BILIRUBIN,UA 03/26/2021 negative     BLOOD,UA 03/26/2021 small     POCT URINE PROTEIN 03/26/2021 negative     SL AMB POCT UROBILINOGEN 03/26/2021 0 2     Urine Culture 03/26/2021 60,000-69,000 cfu/ml Escherichia coli*         Radiology Results:   No results found

## 2021-07-13 NOTE — PROGRESS NOTES
[facility] - Outpatient Consultation  Sveta Boyd 55 y o  female MRN: [id number]  Encounter: [id number]          ASSESSMENT AND PLAN:  60-year-old female with history of seasonal allergies presents for evaluation     1  Family history of colon cancer  She underwent Cologuard testing last year which was negative  She notes family history of her mother diagnosed with colorectal cancer at age 80 and was diagnosed at an advanced stage  I discussed that Cologuard for screening is not ideal for a patient was not at average risk including those with a history of polyps or family history of colon cancer  She is currently without GI complaints  Given her family history and that she has had no colonoscopy in the past I recommend she undergo colonoscopy for screening purposes  I discussed the indication, risk and benefit of colonoscopy for colon cancer screening with her today  Informed consent was obtained for the procedure  Risks of infection, perforation and hemorrhage were discussed  The patient was agreeable to proceed with the procedure  - Colonoscopy; Future  - Na Sulfate-K Sulfate-Mg Sulf (Suprep Bowel Prep Kit) 17 5-3 13-1 6 GM/177ML SOLN; Take as instructed on bowel preparation instructions  Dispense: 177 mL; Refill: 0      ______________________________________________________________________    HPI:  60-year-old female with history of seasonal allergies presents for evaluation  She reports no prior colonoscopy in the past   She underwent Cologuard testing recently which was negative  She notes family history of her mother diagnosed with colorectal cancer at the end of last year requiring surgical resection  She also notes sister diagnosed with precancerous polyps in her mid 62s  She is currently without GI complaints and denies abdominal pain, nausea or vomiting  She has regular, formed bowel movements without melena or hematochezia      She takes no anti-platelet agents   Past GI surgical history includes partial hysterectomy, appendectomy        REVIEW OF SYSTEMS:    CONSTITUTIONAL: Denies any fever, chills, rigors, and weight loss  HEENT: No earache or tinnitus  Denies hearing loss or visual disturbances  CARDIOVASCULAR: No chest pain or palpitations  RESPIRATORY: Denies any cough, hemoptysis, shortness of breath or dyspnea on exertion  GASTROINTESTINAL: As noted in the History of Present Illness  GENITOURINARY: No problems with urination  Denies any hematuria or dysuria  NEUROLOGIC: No dizziness or vertigo, denies headaches  MUSCULOSKELETAL: Denies any muscle or joint pain  SKIN: Denies skin rashes or itching  ENDOCRINE: Denies excessive thirst  Denies intolerance to heat or cold  PSYCHOSOCIAL: Denies depression or anxiety  Denies any recent memory loss         Historical Information   Past Medical History:   Diagnosis Date    Asthma due to seasonal allergies     Kidney stone      Past Surgical History:   Procedure Laterality Date    APPENDECTOMY  10/1982     SECTION      two-  &     HYSTERECTOMY      partial    REPLACEMENT TOTAL KNEE BILATERAL      right- , left-      Social History   Social History     Substance and Sexual Activity   Alcohol Use Yes    Comment: occasional     Social History     Substance and Sexual Activity   Drug Use No     Social History     Tobacco Use   Smoking Status Never Smoker   Smokeless Tobacco Never Used     Family History   Problem Relation Age of Onset    Heart disease Father     Hypertension Father     Urolithiasis Mother     Colon cancer Mother     Kidney cancer Brother     Urolithiasis Brother     Hepatitis Brother         hep c    Kidney disease Brother     Urolithiasis Sister     Diabetes Sister     Kidney disease Sister     Urolithiasis Daughter        Meds/Allergies       Current Outpatient Medications:     cetirizine (ZyrTEC) 10 mg tablet   clotrimazole-betamethasone (LOTRISONE) 1-0 05 % cream    CRANBERRY PO    Albuterol Sulfate (VENTOLIN HFA IN)    amoxicillin-clavulanate (AUGMENTIN) 875-125 mg per tablet    benzonatate (TESSALON PERLES) 100 mg capsule    ergocalciferol (VITAMIN D2) 50,000 units    famotidine (PEPCID) 20 mg tablet    FLUARIX QUADRIVALENT 0 5 ML SHILO    fluticasone (FLONASE) 50 mcg/act nasal spray    Probiotic Product (PROBIOTIC-10 PO)    Allergies   Allergen Reactions    Prednisone Other (See Comments)     Headaches and blood pressure spikes      Sulfa Antibiotics Hives    Vicodin [Hydrocodone-Acetaminophen] Anxiety    Codeine Hives    Colistin      Other reaction(s): Other (See Comments)  colymycin otic    Other      Other reaction(s): Other (See Comments)  burn           Objective     Blood pressure 141/91, pulse 69, temperature (!) 97 °F (36 1 °C), temperature source Tympanic, height 5' 6" (1 676 m), weight 118 kg (259 lb 9 6 oz)  Body mass index is 41 9 kg/m²  PHYSICAL EXAM:      General Appearance:   Alert, cooperative, no distress   HEENT:   Normocephalic, atraumatic, anicteric  Neck:  Supple, symmetrical, trachea midline   Lungs:   Clear to auscultation bilaterally; no rales, rhonchi or wheezing; respirations unlabored    Heart[de-identified]   Regular rate and rhythm; no murmur, rub, or gallop  Abdomen:   Soft, non-tender, non-distended; normal bowel sounds; no masses, no organomegaly    Genitalia:   Deferred    Rectal:   Deferred    Extremities:  No cyanosis, clubbing or edema    Pulses:  2+ and symmetric    Skin:  No jaundice, rashes, or lesions    Lymph nodes:  No palpable cervical lymphadenopathy        Lab Results:   No visits with results within 1 Day(s) from this visit     Latest known visit with results is:   Office Visit on 03/26/2021   Component Date Value     COLOR, 03/26/2021 yellow     CLARITY,UA 03/26/2021 cloudy     SPECIFIC GRAVITY,UA 03/26/2021 1 010      PH,UA 03/26/2021 6 0     LEUKOCYTE ESTERASE,UA 03/26/2021 large     NITRITE,UA 03/26/2021 positive     GLUCOSE, UA 03/26/2021 negative     KETONES,UA 03/26/2021 negative     BILIRUBIN,UA 03/26/2021 negative     BLOOD,UA 03/26/2021 small     POCT URINE PROTEIN 03/26/2021 negative     SL AMB POCT UROBILINOGEN 03/26/2021 0 2     Urine Culture 03/26/2021 60,000-69,000 cfu/ml Escherichia coli*         Radiology Results:   No results found

## 2021-07-19 ENCOUNTER — TELEPHONE (OUTPATIENT)
Dept: SURGERY | Facility: HOSPITAL | Age: 56
End: 2021-07-19

## 2021-07-20 ENCOUNTER — TELEPHONE (OUTPATIENT)
Dept: SURGERY | Facility: HOSPITAL | Age: 56
End: 2021-07-20

## 2021-07-21 ENCOUNTER — ANESTHESIA EVENT (OUTPATIENT)
Dept: PERIOP | Facility: HOSPITAL | Age: 56
End: 2021-07-21

## 2021-07-21 ENCOUNTER — ANESTHESIA (OUTPATIENT)
Dept: PERIOP | Facility: HOSPITAL | Age: 56
End: 2021-07-21

## 2021-07-21 ENCOUNTER — HOSPITAL ENCOUNTER (OUTPATIENT)
Dept: PERIOP | Facility: HOSPITAL | Age: 56
Setting detail: OUTPATIENT SURGERY
Discharge: HOME | End: 2021-07-21
Attending: INTERNAL MEDICINE
Payer: COMMERCIAL

## 2021-07-21 VITALS
TEMPERATURE: 98 F | WEIGHT: 259 LBS | OXYGEN SATURATION: 99 % | HEIGHT: 66 IN | DIASTOLIC BLOOD PRESSURE: 74 MMHG | BODY MASS INDEX: 41.62 KG/M2 | HEART RATE: 64 BPM | SYSTOLIC BLOOD PRESSURE: 121 MMHG | RESPIRATION RATE: 20 BRPM

## 2021-07-21 DIAGNOSIS — Z80.0 FAMILY HISTORY OF COLON CANCER: ICD-10-CM

## 2021-07-21 PROBLEM — K57.30 DIVERTICULA OF COLON: Status: ACTIVE | Noted: 2021-07-21

## 2021-07-21 PROCEDURE — G0105 COLORECTAL SCRN; HI RISK IND: HCPCS | Performed by: INTERNAL MEDICINE

## 2021-07-21 RX ORDER — PROPOFOL 10 MG/ML
INJECTION, EMULSION INTRAVENOUS AS NEEDED
Status: DISCONTINUED | OUTPATIENT
Start: 2021-07-21 | End: 2021-07-21

## 2021-07-21 RX ORDER — SODIUM CHLORIDE, SODIUM LACTATE, POTASSIUM CHLORIDE, CALCIUM CHLORIDE 600; 310; 30; 20 MG/100ML; MG/100ML; MG/100ML; MG/100ML
125 INJECTION, SOLUTION INTRAVENOUS CONTINUOUS
Status: DISCONTINUED | OUTPATIENT
Start: 2021-07-21 | End: 2021-07-25 | Stop reason: HOSPADM

## 2021-07-21 RX ORDER — LIDOCAINE HYDROCHLORIDE 10 MG/ML
INJECTION, SOLUTION EPIDURAL; INFILTRATION; INTRACAUDAL; PERINEURAL AS NEEDED
Status: DISCONTINUED | OUTPATIENT
Start: 2021-07-21 | End: 2021-07-21

## 2021-07-21 RX ADMIN — PROPOFOL 100 MG: 10 INJECTION, EMULSION INTRAVENOUS at 08:54

## 2021-07-21 RX ADMIN — SODIUM CHLORIDE, SODIUM LACTATE, POTASSIUM CHLORIDE, AND CALCIUM CHLORIDE 125 ML/HR: .6; .31; .03; .02 INJECTION, SOLUTION INTRAVENOUS at 08:20

## 2021-07-21 RX ADMIN — PROPOFOL 150 MG: 10 INJECTION, EMULSION INTRAVENOUS at 08:40

## 2021-07-21 RX ADMIN — LIDOCAINE HYDROCHLORIDE 50 MG: 10 INJECTION, SOLUTION EPIDURAL; INFILTRATION; INTRACAUDAL; PERINEURAL at 08:40

## 2021-07-21 RX ADMIN — PROPOFOL 100 MG: 10 INJECTION, EMULSION INTRAVENOUS at 08:51

## 2021-07-21 RX ADMIN — PROPOFOL 100 MG: 10 INJECTION, EMULSION INTRAVENOUS at 08:46

## 2021-07-21 NOTE — ANESTHESIA POSTPROCEDURE EVALUATION
Post-Op Assessment Note    CV Status:  Stable  Pain Score: 0    Pain management: adequate     Mental Status:  Sleepy   Hydration Status:  Euvolemic   PONV Controlled:  Controlled   Airway Patency:  Patent      Post Op Vitals Reviewed: Yes      Staff: CRNA         No complications documented      BP      Temp      Pulse     Resp      SpO2

## 2021-07-21 NOTE — INTERVAL H&P NOTE
H&P reviewed  After examining the patient I find no changes in the patients condition since the H&P had been written      Vitals:    07/21/21 0807   BP: 145/94   Pulse: 70   Resp: 20   Temp: 98 °F (36 7 °C)   SpO2: 99%

## 2021-07-21 NOTE — ANESTHESIA PREPROCEDURE EVALUATION
Procedure:  COLONOSCOPY    Relevant Problems   GI/HEPATIC   (+) Esophageal reflux      Other   (+) Class 2 obesity due to excess calories without serious comorbidity with body mass index (BMI) of 39 0 to 39 9 in adult      Asthma due to seasonal allergies    Kidney stone      Morbid obesity    Physical Exam    Airway    Mallampati score: II  TM Distance: >3 FB  Neck ROM: full     Dental       Cardiovascular  Cardiovascular exam normal    Pulmonary  Pulmonary exam normal     Other Findings        Anesthesia Plan  ASA Score- 3     Anesthesia Type- IV sedation with anesthesia with ASA Monitors  Additional Monitors:   Airway Plan:           Plan Factors-Exercise tolerance (METS): >4 METS  Chart reviewed  EKG reviewed  Imaging results reviewed  Existing labs reviewed  Patient summary reviewed  Induction- intravenous  Postoperative Plan-     Informed Consent- Anesthetic plan and risks discussed with patient  I personally reviewed this patient with the CRNA  Discussed and agreed on the Anesthesia Plan with the CRNA  Tita Casanova

## 2021-08-23 ENCOUNTER — APPOINTMENT (OUTPATIENT)
Dept: LAB | Facility: CLINIC | Age: 56
End: 2021-08-23
Payer: COMMERCIAL

## 2022-02-23 ENCOUNTER — OFFICE VISIT (OUTPATIENT)
Dept: URGENT CARE | Facility: CLINIC | Age: 57
End: 2022-02-23
Payer: COMMERCIAL

## 2022-02-23 VITALS — OXYGEN SATURATION: 95 % | BODY MASS INDEX: 41.8 KG/M2 | WEIGHT: 259 LBS | HEART RATE: 95 BPM | TEMPERATURE: 97.3 F

## 2022-02-23 DIAGNOSIS — J06.9 ACUTE UPPER RESPIRATORY INFECTION: Primary | ICD-10-CM

## 2022-02-23 DIAGNOSIS — R05.1 ACUTE COUGH: ICD-10-CM

## 2022-02-23 DIAGNOSIS — J02.9 SORE THROAT: ICD-10-CM

## 2022-02-23 LAB — S PYO AG THROAT QL: NEGATIVE

## 2022-02-23 PROCEDURE — U0005 INFEC AGEN DETEC AMPLI PROBE: HCPCS | Performed by: NURSE PRACTITIONER

## 2022-02-23 PROCEDURE — 99214 OFFICE O/P EST MOD 30 MIN: CPT | Performed by: NURSE PRACTITIONER

## 2022-02-23 PROCEDURE — 87880 STREP A ASSAY W/OPTIC: CPT | Performed by: NURSE PRACTITIONER

## 2022-02-23 PROCEDURE — U0003 INFECTIOUS AGENT DETECTION BY NUCLEIC ACID (DNA OR RNA); SEVERE ACUTE RESPIRATORY SYNDROME CORONAVIRUS 2 (SARS-COV-2) (CORONAVIRUS DISEASE [COVID-19]), AMPLIFIED PROBE TECHNIQUE, MAKING USE OF HIGH THROUGHPUT TECHNOLOGIES AS DESCRIBED BY CMS-2020-01-R: HCPCS | Performed by: NURSE PRACTITIONER

## 2022-02-23 PROCEDURE — 87070 CULTURE OTHR SPECIMN AEROBIC: CPT | Performed by: NURSE PRACTITIONER

## 2022-02-23 RX ORDER — AZITHROMYCIN 250 MG/1
TABLET, FILM COATED ORAL
Qty: 6 TABLET | Refills: 0 | Status: SHIPPED | OUTPATIENT
Start: 2022-02-23 | End: 2022-02-27

## 2022-02-23 NOTE — PATIENT INSTRUCTIONS
You have a covid and throat culture pending  You are to download [employer] for results in 24-72 hours  You are to do warm salt water gargles  Tylenol or motrin for pain  Start the azithromycin if the covid is negative and throat culture is +  Give your symptoms at least another 2-3 days before antibiotics are used  This appears viral   You are to follow up with your PCP  Go to the ED if symptoms worsen    Acute Bronchitis   WHAT YOU NEED TO KNOW:   Acute bronchitis is swelling and irritation in your lungs  It is usually caused by a virus and most often happens in the winter  Bronchitis may also be caused by bacteria or by a chemical irritant, such as smoke  DISCHARGE INSTRUCTIONS:   Return to the emergency department if:   · You cough up blood  · Your lips or fingernails turn blue  · You feel like you are not getting enough air when you breathe  Call your doctor if:   · Your symptoms do not go away or get worse, even after treatment  · Your cough does not get better within 4 weeks  · You have questions or concerns about your condition or care  Medicines: You may  need any of the following:  · Cough suppressants  decrease your urge to cough  · Decongestants  help loosen mucus in your lungs and make it easier to cough up  This can help you breathe easier  · Inhalers  may be given  Your healthcare provider may give you one or more inhalers to help you breathe easier and cough less  An inhaler gives your medicine to open your airways  Ask your healthcare provider to show you how to use your inhaler correctly  · Antibiotics  may be given for up to 5 days if your bronchitis is caused by bacteria  · Acetaminophen  decreases pain and fever  It is available without a doctor's order  Ask how much to take and how often to take it  Follow directions   Read the labels of all other medicines you are using to see if they also contain acetaminophen, or ask your doctor or pharmacist  Acetaminophen can cause liver damage if not taken correctly  Do not use more than 4 grams (4,000 milligrams) total of acetaminophen in one day  · NSAIDs  help decrease swelling and pain or fever  This medicine is available with or without a doctor's order  NSAIDs can cause stomach bleeding or kidney problems in certain people  If you take blood thinner medicine, always ask your healthcare provider if NSAIDs are safe for you  Always read the medicine label and follow directions  · Take your medicine as directed  Contact your healthcare provider if you think your medicine is not helping or if you have side effects  Tell him of her if you are allergic to any medicine  Keep a list of the medicines, vitamins, and herbs you take  Include the amounts, and when and why you take them  Bring the list or the pill bottles to follow-up visits  Carry your medicine list with you in case of an emergency  Self-care:   · Drink liquids as directed  You may need to drink more liquids than usual to stay hydrated  Ask how much liquid to drink each day and which liquids are best for you  · Use a cool mist humidifier  to increase air moisture in your home  This may make it easier for you to breathe and help decrease your cough  · Get more rest   Rest helps your body to heal  Slowly start to do more each day  Rest when you feel it is needed  · Avoid irritants in the air  Avoid chemicals, fumes, and dust  Wear a face mask if you must work around dust or fumes  Stay inside on days when air pollution levels are high  If you have allergies, stay inside when pollen counts are high  Do not use aerosol products, such as spray-on deodorant, bug spray, and hair spray  · Do not smoke or be around others who are smoking  Nicotine and other chemicals in cigarettes and cigars can cause lung damage  Ask your healthcare provider for information if you currently smoke and need help to quit   E-cigarettes or smokeless tobacco still contain nicotine  Talk to your healthcare provider before you use these products  Prevent acute bronchitis:       · Ask about vaccines you may need  Get a flu vaccine each year as soon as recommended, usually in September or October  Ask your healthcare provider if you should also get a pneumonia or COVID-19 vaccine  Your healthcare provider can tell you if you should also get other vaccines, and when to get them  · Prevent the spread of germs  You can decrease your risk for acute bronchitis and other illnesses by doing the following:     ? Wash your hands often with soap and water  Carry germ-killing hand lotion or gel with you  You can use the lotion or gel to clean your hands when soap and water are not available  ? Do not touch your eyes, nose, or mouth unless you have washed your hands first     ? Always cover your mouth when you cough to prevent the spread of germs  It is best to cough into a tissue or your shirt sleeve instead of into your hand  Ask those around you to cover their mouths when they cough  ? Try to avoid people who have a cold or the flu  If you are sick, stay away from others as much as possible  Follow up with your doctor as directed:  Write down questions you have so you will remember to ask them during your follow-up visits  © Copyright [employer] 2021 Information is for End User's use only and may not be sold, redistributed or otherwise used for commercial purposes  All illustrations and images included in CareNotes® are the copyrighted property of A D A M , Inc  or [employer]  The above information is an  only  It is not intended as medical advice for individual conditions or treatments  Talk to your doctor, nurse or pharmacist before following any medical regimen to see if it is safe and effective for you    Pharyngitis   WHAT YOU NEED TO KNOW:   Pharyngitis, or sore throat, is inflammation of the tissues and structures in your pharynx (throat)  Pharyngitis is most often caused by bacteria  It may also be caused by a cold or flu virus  Other causes include smoking, allergies, or acid reflux  DISCHARGE INSTRUCTIONS:   Call 911 for any of the following:   · You have trouble breathing or swallowing because your throat is swollen or sore  Return to the emergency department if:   · You are drooling because it hurts too much to swallow  · Your fever is higher than 102? F (39?C) or lasts longer than 3 days  · You are confused  · You taste blood in your throat  Contact your healthcare provider if:   · Your throat pain gets worse  · You have a painful lump in your throat that does not go away after 5 days  · Your symptoms do not improve after 5 days  · You have questions or concerns about your condition or care  Medicines:  Viral pharyngitis will go away on its own without treatment  Your sore throat should start to feel better in 3 to 5 days for both viral and bacterial infections  You may need any of the following:  · Antibiotics  treat a bacterial infection  · NSAIDs , such as ibuprofen, help decrease swelling, pain, and fever  NSAIDs can cause stomach bleeding or kidney problems in certain people  If you take blood thinner medicine, always ask your healthcare provider if NSAIDs are safe for you  Always read the medicine label and follow directions  · Acetaminophen  decreases pain and fever  It is available without a doctor's order  Ask how much to take and how often to take it  Follow directions  Acetaminophen can cause liver damage if not taken correctly  · Take your medicine as directed  Contact your healthcare provider if you think your medicine is not helping or if you have side effects  Tell him or her if you are allergic to any medicine  Keep a list of the medicines, vitamins, and herbs you take  Include the amounts, and when and why you take them  Bring the list or the pill bottles to follow-up visits  Carry your medicine list with you in case of an emergency  Manage your symptoms:   · Gargle salt water  Mix ¼ teaspoon salt in an 8 ounce glass of warm water and gargle  This may help decrease swelling in your throat  · Drink liquids as directed  You may need to drink more liquids than usual  Liquids may help soothe your throat and prevent dehydration  Ask how much liquid to drink each day and which liquids are best for you  · Use a cool-steam humidifier  to help moisten the air in your room and calm your cough  · Soothe your throat  with cough drops, ice, soft foods, or popsicles  Prevent the spread of pharyngitis:  Cover your mouth and nose when you cough or sneeze  Do not share food or drinks  Wash your hands often  Use soap and water  If soap and water are unavailable, use an alcohol based hand   Follow up with your doctor as directed:  Write down your questions so you remember to ask them during your visits  © Copyright [employer] 2021 Information is for End User's use only and may not be sold, redistributed or otherwise used for commercial purposes  All illustrations and images included in CareNotes® are the copyrighted property of A D A M , Inc  or [employer]ema [employer]   The above information is an  only  It is not intended as medical advice for individual conditions or treatments  Talk to your doctor, nurse or pharmacist before following any medical regimen to see if it is safe and effective for you

## 2022-02-23 NOTE — PROGRESS NOTES
[facility]        NAME: Chris Brown is a 55 y o  female  : 1965    MRN: [id number]  DATE: 2022  TIME: 4:20 PM    Assessment and Plan   Acute upper respiratory infection [J06 9]  1  Acute upper respiratory infection  azithromycin (ZITHROMAX) 250 mg tablet   2  Sore throat  POCT rapid strepA    Throat culture    azithromycin (ZITHROMAX) 250 mg tablet   3  Acute cough  COVID Only -Office Collect    azithromycin (ZITHROMAX) 250 mg tablet         Patient Instructions       Follow up with PCP in 3-5 days  Proceed to  ER if symptoms worsen  You have a covid and throat culture pending  You are to download [facility] mychart for results in 24-72 hours  You are to do warm salt water gargles  Tylenol or motrin for pain  Start the azithromycin if the covid is negative and throat culture is +  Give your symptoms at least another 2-3 days before antibiotics are used  This appears viral   You are to follow up with your PCP  Go to the ED if symptoms worsen        Chief Complaint     Chief Complaint   Patient presents with    Sore Throat         History of Present Illness       This is a 55year old female who states started with sorethroat this am and now has laryngitis  She has nasal congestion and cough  She is covid vaccinated works at [employer] and a   She states she has been taking OTC products w/o relief  She states she is covid vaccinated  Review of Systems   Review of Systems   Constitutional: Positive for fever  HENT: Positive for congestion, sore throat and voice change  Eyes: Negative  Respiratory: Positive for cough  Cardiovascular: Negative  Gastrointestinal: Negative  Endocrine: Negative  Genitourinary: Negative  Musculoskeletal: Negative  Skin: Negative  Allergic/Immunologic: Negative  Neurological: Negative  Hematological: Negative  Psychiatric/Behavioral: Negative            Current Medications       Current Outpatient Medications:     Albuterol Sulfate (VENTOLIN HFA IN), Inhale (Patient not taking: Reported on 7/13/2021), Disp: , Rfl:     amoxicillin-clavulanate (AUGMENTIN) 875-125 mg per tablet, , Disp: , Rfl:     azithromycin (ZITHROMAX) 250 mg tablet, Take 2 tablets today then 1 tablet daily x 4 days, Disp: 6 tablet, Rfl: 0    benzonatate (TESSALON PERLES) 100 mg capsule, One or 2 tablets every 8 hours as needed for cough (Patient not taking: Reported on 11/6/2020), Disp: 30 capsule, Rfl: 0    cetirizine (ZyrTEC) 10 mg tablet, Take 10 mg by mouth, Disp: , Rfl:     clotrimazole-betamethasone (LOTRISONE) 1-0 05 % cream, Apply topically 2 (two) times a day, Disp: , Rfl:     CRANBERRY PO, Take by mouth 2 (two) times a day, Disp: , Rfl:     ergocalciferol (VITAMIN D2) 50,000 units, Take 1 capsule (50,000 Units total) by mouth once a week (Patient not taking: Reported on 7/13/2021), Disp: 4 capsule, Rfl: 5    famotidine (PEPCID) 20 mg tablet, Take 1 tablet (20 mg total) by mouth 2 (two) times a day (Patient not taking: Reported on 3/26/2021), Disp: 90 tablet, Rfl: 0    FLUARIX QUADRIVALENT 0 5 ML SHILO, inject 0 5 milliliter intramuscularly (Patient not taking: Reported on 7/13/2021), Disp: , Rfl: 0    fluticasone (FLONASE) 50 mcg/act nasal spray, , Disp: , Rfl:     Na Sulfate-K Sulfate-Mg Sulf (Suprep Bowel Prep Kit) 17 5-3 13-1 6 GM/177ML SOLN, Take as instructed on bowel preparation instructions, Disp: 177 mL, Rfl: 0    Probiotic Product (PROBIOTIC-10 PO), Take by mouth (Patient not taking: Reported on 7/13/2021), Disp: , Rfl:     Current Allergies     Allergies as of 02/23/2022 - Reviewed 02/23/2022   Allergen Reaction Noted    Prednisone Other (See Comments) 07/26/2016    Sulfa antibiotics Hives 07/26/2016    Vicodin [hydrocodone-acetaminophen] Anxiety 07/26/2016    Codeine Hives 07/26/2016    Colistin      Other              The following portions of the patient's history were reviewed and updated as appropriate: allergies, current medications, past family history, past medical history, past social history, past surgical history and problem list      Past Medical History:   Diagnosis Date    Asthma due to seasonal allergies     Kidney stone        Past Surgical History:   Procedure Laterality Date    APPENDECTOMY  10/1982     SECTION      two- [address]    partial    JOINT REPLACEMENT      REPLACEMENT TOTAL KNEE BILATERAL      right- , left-        Family History   Problem Relation Age of Onset    Heart disease Father     Hypertension Father     Urolithiasis Mother     Colon cancer Mother     Kidney cancer Brother     Urolithiasis Brother     Hepatitis Brother         hep c    Kidney disease Brother     Urolithiasis Sister     Diabetes Sister     Kidney disease Sister     Urolithiasis Daughter          Medications have been verified  Objective   Pulse 95   Temp (!) 97 3 °F (36 3 °C)   Wt 117 kg (259 lb)   LMP  (LMP Unknown)   SpO2 95%   BMI 41 80 kg/m²   No LMP recorded (lmp unknown)  Patient has had a hysterectomy  Physical Exam     Physical Exam  Vitals and nursing note reviewed  Constitutional:       General: She is not in acute distress  Appearance: She is well-developed  She is obese  She is not ill-appearing or diaphoretic  HENT:      Head: Normocephalic and atraumatic  Right Ear: Tympanic membrane and ear canal normal       Left Ear: Tympanic membrane and ear canal normal       Nose: Congestion present  Mouth/Throat:      Mouth: Mucous membranes are moist  No oral lesions  Pharynx: Posterior oropharyngeal erythema present  No pharyngeal swelling, oropharyngeal exudate or uvula swelling  Eyes:      Conjunctiva/sclera: Conjunctivae normal       Pupils: Pupils are equal, round, and reactive to light  Cardiovascular:      Rate and Rhythm: Normal rate and regular rhythm        Heart sounds: Normal heart sounds  Pulmonary:      Effort: Pulmonary effort is normal  No respiratory distress  Breath sounds: Normal breath sounds  No stridor  No wheezing, rhonchi or rales  Chest:      Chest wall: No tenderness  Abdominal:      General: Bowel sounds are normal       Palpations: Abdomen is soft  Musculoskeletal:      Cervical back: Normal range of motion and neck supple  Skin:     General: Skin is warm and dry  Capillary Refill: Capillary refill takes less than 2 seconds  Neurological:      General: No focal deficit present  Mental Status: She is alert and oriented to person, place, and time     Psychiatric:         Mood and Affect: Mood normal

## 2022-02-24 LAB — SARS-COV-2 RNA RESP QL NAA+PROBE: POSITIVE

## 2022-02-25 LAB — BACTERIA THROAT CULT: NORMAL

## 2022-03-02 ENCOUNTER — TELEPHONE (OUTPATIENT)
Dept: ADMINISTRATIVE | Facility: OTHER | Age: 57
End: 2022-03-02

## 2022-03-02 NOTE — TELEPHONE ENCOUNTER
Upon review of the In Basket request we were able to locate, review, and update the patient chart as requested for Mammogram     Any additional questions or concerns should be emailed to the Practice Liaisons via [email or web address] email, please do not reply via In Basket      Thank you  Luz Ortiz

## 2022-03-02 NOTE — TELEPHONE ENCOUNTER
----- Message from Shahnaz Pettit MA sent at 3/1/2022  1:43 PM EST -----  Regarding: mammo  03/01/22 1:44 PM    Hello, our patient Maximo Olson has had Mammogram completed/performed  Please assist in updating the patient chart by pulling the Care Everywhere (CE) document  The date of service is 8/23/2021       Thank you,  Shahnaz Pettit MA  [facility] PRIMARY [facility]

## 2022-03-18 ENCOUNTER — TELEPHONE (OUTPATIENT)
Dept: FAMILY MEDICINE CLINIC | Facility: CLINIC | Age: 57
End: 2022-03-18

## 2022-03-18 NOTE — TELEPHONE ENCOUNTER
Patient is having stomach issues  She said this happened around the same time last year (1/22/2021)  She stated that when she eats, it just runs right out of her  She told me there is family history of gi problems and would like to know if she should just go to her gi doctor or if she can use the collection kits that she has from last year and drop them off

## 2022-03-19 ENCOUNTER — HOSPITAL ENCOUNTER (EMERGENCY)
Facility: HOSPITAL | Age: 57
Discharge: HOME | End: 2022-03-19
Attending: EMERGENCY MEDICINE
Payer: COMMERCIAL

## 2022-03-19 VITALS
WEIGHT: 250 LBS | HEIGHT: 67 IN | BODY MASS INDEX: 39.24 KG/M2 | RESPIRATION RATE: 16 BRPM | SYSTOLIC BLOOD PRESSURE: 153 MMHG | HEART RATE: 88 BPM | TEMPERATURE: 97.6 F | OXYGEN SATURATION: 96 % | DIASTOLIC BLOOD PRESSURE: 107 MMHG

## 2022-03-19 DIAGNOSIS — E86.0 DEHYDRATION: ICD-10-CM

## 2022-03-19 DIAGNOSIS — R19.7 DIARRHEA: Primary | ICD-10-CM

## 2022-03-19 LAB
ALBUMIN SERPL BCP-MCNC: 4.3 G/DL (ref 3.5–5)
ALP SERPL-CCNC: 85 U/L (ref 34–104)
ALT SERPL W P-5'-P-CCNC: 23 U/L (ref 7–52)
ANION GAP SERPL CALCULATED.3IONS-SCNC: 10 MMOL/L (ref 4–13)
AST SERPL W P-5'-P-CCNC: 12 U/L (ref 13–39)
BASOPHILS # BLD AUTO: 0.03 THOUSANDS/ΜL (ref 0–0.1)
BASOPHILS NFR BLD AUTO: 0 % (ref 0–1)
BILIRUB SERPL-MCNC: 0.51 MG/DL (ref 0.2–1)
BILIRUB UR QL STRIP: NEGATIVE
BUN SERPL-MCNC: 8 MG/DL (ref 5–25)
CALCIUM SERPL-MCNC: 9.4 MG/DL (ref 8.4–10.2)
CAMPYLOBACTER DNA SPEC NAA+PROBE: NORMAL
CHLORIDE SERPL-SCNC: 106 MMOL/L (ref 96–108)
CLARITY UR: CLEAR
CO2 SERPL-SCNC: 23 MMOL/L (ref 21–32)
COLOR UR: YELLOW
CREAT SERPL-MCNC: 0.68 MG/DL (ref 0.6–1.3)
EOSINOPHIL # BLD AUTO: 0.36 THOUSAND/ΜL (ref 0–0.61)
EOSINOPHIL NFR BLD AUTO: 3 % (ref 0–6)
ERYTHROCYTE [DISTWIDTH] IN BLOOD BY AUTOMATED COUNT: 14.4 % (ref 11.6–15.1)
GFR SERPL CREATININE-BSD FRML MDRD: 98 ML/MIN/1.73SQ M
GLUCOSE SERPL-MCNC: 113 MG/DL (ref 65–140)
GLUCOSE UR STRIP-MCNC: NEGATIVE MG/DL
HCT VFR BLD AUTO: 51.9 % (ref 34.8–46.1)
HGB BLD-MCNC: 16.3 G/DL (ref 11.5–15.4)
HGB UR QL STRIP.AUTO: NEGATIVE
IMM GRANULOCYTES # BLD AUTO: 0.09 THOUSAND/UL (ref 0–0.2)
IMM GRANULOCYTES NFR BLD AUTO: 1 % (ref 0–2)
KETONES UR STRIP-MCNC: ABNORMAL MG/DL
LEUKOCYTE ESTERASE UR QL STRIP: NEGATIVE
LIPASE SERPL-CCNC: 8 U/L (ref 11–82)
LYMPHOCYTES # BLD AUTO: 1.6 THOUSANDS/ΜL (ref 0.6–4.47)
LYMPHOCYTES NFR BLD AUTO: 11 % (ref 14–44)
MAGNESIUM SERPL-MCNC: 1.7 MG/DL (ref 1.9–2.7)
MCH RBC QN AUTO: 26.9 PG (ref 26.8–34.3)
MCHC RBC AUTO-ENTMCNC: 31.4 G/DL (ref 31.4–37.4)
MCV RBC AUTO: 86 FL (ref 82–98)
MONOCYTES # BLD AUTO: 0.86 THOUSAND/ΜL (ref 0.17–1.22)
MONOCYTES NFR BLD AUTO: 6 % (ref 4–12)
NEUTROPHILS # BLD AUTO: 11.3 THOUSANDS/ΜL (ref 1.85–7.62)
NEUTS SEG NFR BLD AUTO: 79 % (ref 43–75)
NITRITE UR QL STRIP: NEGATIVE
NRBC BLD AUTO-RTO: 0 /100 WBCS
PH UR STRIP.AUTO: 6 [PH]
PLATELET # BLD AUTO: 364 THOUSANDS/UL (ref 149–390)
PMV BLD AUTO: 9.6 FL (ref 8.9–12.7)
POTASSIUM SERPL-SCNC: 3.6 MMOL/L (ref 3.5–5.3)
PROT SERPL-MCNC: 7.1 G/DL (ref 6.4–8.4)
PROT UR STRIP-MCNC: NEGATIVE MG/DL
RBC # BLD AUTO: 6.05 MILLION/UL (ref 3.81–5.12)
SALMONELLA DNA SPEC QL NAA+PROBE: NORMAL
SHIGA TOXIN STX GENE SPEC NAA+PROBE: NORMAL
SHIGELLA DNA SPEC QL NAA+PROBE: NORMAL
SODIUM SERPL-SCNC: 139 MMOL/L (ref 135–147)
SP GR UR STRIP.AUTO: >=1.03 (ref 1–1.03)
UROBILINOGEN UR QL STRIP.AUTO: 0.2 E.U./DL
WBC # BLD AUTO: 14.24 THOUSAND/UL (ref 4.31–10.16)

## 2022-03-19 PROCEDURE — 96375 TX/PRO/DX INJ NEW DRUG ADDON: CPT

## 2022-03-19 PROCEDURE — 81003 URINALYSIS AUTO W/O SCOPE: CPT | Performed by: PHYSICIAN ASSISTANT

## 2022-03-19 PROCEDURE — 83690 ASSAY OF LIPASE: CPT | Performed by: PHYSICIAN ASSISTANT

## 2022-03-19 PROCEDURE — 83735 ASSAY OF MAGNESIUM: CPT | Performed by: PHYSICIAN ASSISTANT

## 2022-03-19 PROCEDURE — 36415 COLL VENOUS BLD VENIPUNCTURE: CPT | Performed by: PHYSICIAN ASSISTANT

## 2022-03-19 PROCEDURE — 80053 COMPREHEN METABOLIC PANEL: CPT | Performed by: PHYSICIAN ASSISTANT

## 2022-03-19 PROCEDURE — 85025 COMPLETE CBC W/AUTO DIFF WBC: CPT | Performed by: PHYSICIAN ASSISTANT

## 2022-03-19 PROCEDURE — 96365 THER/PROPH/DIAG IV INF INIT: CPT

## 2022-03-19 PROCEDURE — 99284 EMERGENCY DEPT VISIT MOD MDM: CPT | Performed by: PHYSICIAN ASSISTANT

## 2022-03-19 PROCEDURE — 96361 HYDRATE IV INFUSION ADD-ON: CPT

## 2022-03-19 PROCEDURE — 87505 NFCT AGENT DETECTION GI: CPT | Performed by: PHYSICIAN ASSISTANT

## 2022-03-19 PROCEDURE — 99284 EMERGENCY DEPT VISIT MOD MDM: CPT

## 2022-03-19 RX ORDER — ONDANSETRON 4 MG/1
4 TABLET, ORALLY DISINTEGRATING ORAL EVERY 6 HOURS PRN
Qty: 20 TABLET | Refills: 0 | Status: SHIPPED | OUTPATIENT
Start: 2022-03-19

## 2022-03-19 RX ORDER — MAGNESIUM SULFATE HEPTAHYDRATE 40 MG/ML
2 INJECTION, SOLUTION INTRAVENOUS ONCE
Status: COMPLETED | OUTPATIENT
Start: 2022-03-19 | End: 2022-03-19

## 2022-03-19 RX ORDER — KETOROLAC TROMETHAMINE 30 MG/ML
15 INJECTION, SOLUTION INTRAMUSCULAR; INTRAVENOUS ONCE
Status: COMPLETED | OUTPATIENT
Start: 2022-03-19 | End: 2022-03-19

## 2022-03-19 RX ORDER — ONDANSETRON 2 MG/ML
4 INJECTION INTRAMUSCULAR; INTRAVENOUS ONCE
Status: COMPLETED | OUTPATIENT
Start: 2022-03-19 | End: 2022-03-19

## 2022-03-19 RX ADMIN — MAGNESIUM SULFATE 2 G: 2 INJECTION INTRAVENOUS at 11:10

## 2022-03-19 RX ADMIN — ONDANSETRON 4 MG: 2 INJECTION INTRAMUSCULAR; INTRAVENOUS at 09:46

## 2022-03-19 RX ADMIN — SODIUM CHLORIDE 1000 ML: 0.9 INJECTION, SOLUTION INTRAVENOUS at 11:08

## 2022-03-19 RX ADMIN — KETOROLAC TROMETHAMINE 15 MG: 30 INJECTION, SOLUTION INTRAMUSCULAR at 09:48

## 2022-03-19 RX ADMIN — SODIUM CHLORIDE 1000 ML: 0.9 INJECTION, SOLUTION INTRAVENOUS at 09:46

## 2022-03-21 ENCOUNTER — TELEPHONE (OUTPATIENT)
Dept: GASTROENTEROLOGY | Facility: CLINIC | Age: 57
End: 2022-03-21

## 2022-03-21 NOTE — TELEPHONE ENCOUNTER
Patients GI provider:  Dr Leland Palenciar    Number to return call: [phone]    Reason for call: Pt calling asking if she needs a stool sample before her appt tomorrow      Scheduled procedure/appointment date if applicable: Appt: 7/32/8793

## 2022-03-22 ENCOUNTER — OFFICE VISIT (OUTPATIENT)
Dept: GASTROENTEROLOGY | Facility: MEDICAL CENTER | Age: 57
End: 2022-03-22
Payer: COMMERCIAL

## 2022-03-22 ENCOUNTER — APPOINTMENT (OUTPATIENT)
Dept: LAB | Facility: MEDICAL CENTER | Age: 57
End: 2022-03-22
Payer: COMMERCIAL

## 2022-03-22 VITALS
SYSTOLIC BLOOD PRESSURE: 131 MMHG | WEIGHT: 250 LBS | DIASTOLIC BLOOD PRESSURE: 98 MMHG | TEMPERATURE: 98.4 F | HEART RATE: 66 BPM | BODY MASS INDEX: 39.16 KG/M2

## 2022-03-22 DIAGNOSIS — A09 DIARRHEA OF INFECTIOUS ORIGIN: Primary | ICD-10-CM

## 2022-03-22 DIAGNOSIS — R19.7 DIARRHEA, UNSPECIFIED TYPE: ICD-10-CM

## 2022-03-22 DIAGNOSIS — A09 DIARRHEA OF INFECTIOUS ORIGIN: ICD-10-CM

## 2022-03-22 LAB — IGA SERPL-MCNC: 80 MG/DL (ref 70–400)

## 2022-03-22 PROCEDURE — 87493 C DIFF AMPLIFIED PROBE: CPT | Performed by: PHYSICIAN ASSISTANT

## 2022-03-22 PROCEDURE — 36415 COLL VENOUS BLD VENIPUNCTURE: CPT

## 2022-03-22 PROCEDURE — 87209 SMEAR COMPLEX STAIN: CPT

## 2022-03-22 PROCEDURE — 99213 OFFICE O/P EST LOW 20 MIN: CPT | Performed by: PHYSICIAN ASSISTANT

## 2022-03-22 PROCEDURE — 86364 TISS TRNSGLTMNASE EA IG CLAS: CPT

## 2022-03-22 PROCEDURE — 87329 GIARDIA AG IA: CPT | Performed by: PHYSICIAN ASSISTANT

## 2022-03-22 PROCEDURE — 87177 OVA AND PARASITES SMEARS: CPT

## 2022-03-22 PROCEDURE — 82784 ASSAY IGA/IGD/IGG/IGM EACH: CPT

## 2022-03-22 PROCEDURE — 1036F TOBACCO NON-USER: CPT | Performed by: PHYSICIAN ASSISTANT

## 2022-03-22 NOTE — PROGRESS NOTES
[facility] - Outpatient Follow-up Note  Cora Jose Alejandro 55 y o  female MRN: [id number]  Encounter: [id number]          ASSESSMENT AND PLAN:      1  Diarrhea of infectious origin: she admits to multiple episodes of diarrhea since last Monday that was liquid  She had taken recent zpak for bronchitis  She went to the ED for dehydration and had a white count of 14  She had bacterial panel that was negative  Will rule out infectious etiology   - Clostridium difficile toxin by PCR with EIA  - Giardia antigen  - Ova and parasite examination; Future  - Tissue transglutaminase, IgA; Future  - IgA; Future  -start daily probiotic  -continue BRAT diet, can slowly advance   -low fod map diet handout given  -f/u 1 month     ______________________________________________________________________    SUBJECTIVE:  Mcarthur Bloch is a 61-year-old female with a past medical history of asthma, kidney stones who is here for diarrhea  She states that she has had diarrhea since last Monday with multiple episodes of liquid stool on a daily basis  She does admit to taking a Z-John for bronchitis prior to her symptoms starting  She denies any travel, sick contacts, eating any food or drink out of the ordinary  She denies any significant abdominal pain  She did go to the emergency room 03/19/2022 secondary to dehydration from her diarrhea  At that time showed a white count of 14  A bacterial stool panel was done which was negative  She denies any melena or hematochezia, nausea or vomiting  Her last colonoscopy was in July of 2021 with multiple small large, extensive pancolonic diverticula  Otherwise normal   Repeat recommended in 5 years due to family history of colon cancer  REVIEW OF SYSTEMS IS OTHERWISE NEGATIVE        Historical Information   Past Medical History:   Diagnosis Date    Asthma due to seasonal allergies     Kidney stone      Past Surgical History:   Procedure Laterality Date    APPENDECTOMY  10/1982     SECTION      two- [address]    partial    JOINT REPLACEMENT      REPLACEMENT TOTAL KNEE BILATERAL      right- , left-      Social History   Social History     Substance and Sexual Activity   Alcohol Use Yes    Comment: occasional     Social History     Substance and Sexual Activity   Drug Use No     Social History     Tobacco Use   Smoking Status Never Smoker   Smokeless Tobacco Never Used     Family History   Problem Relation Age of Onset    Heart disease Father     Hypertension Father     Urolithiasis Mother     Colon cancer Mother     Kidney cancer Brother     Urolithiasis Brother     Hepatitis Brother         hep c    Kidney disease Brother     Urolithiasis Sister     Diabetes Sister     Kidney disease Sister     Urolithiasis Daughter        Meds/Allergies       Current Outpatient Medications:     cetirizine (ZyrTEC) 10 mg tablet    clotrimazole-betamethasone (LOTRISONE) 1-0 05 % cream    CRANBERRY PO    Albuterol Sulfate (VENTOLIN HFA IN)    amoxicillin-clavulanate (AUGMENTIN) 875-125 mg per tablet    benzonatate (TESSALON PERLES) 100 mg capsule    ergocalciferol (VITAMIN D2) 50,000 units    famotidine (PEPCID) 20 mg tablet    FLUARIX QUADRIVALENT 0 5 ML SHILO    fluticasone (FLONASE) 50 mcg/act nasal spray    Na Sulfate-K Sulfate-Mg Sulf (Suprep Bowel Prep Kit) 17 5-3 13-1 6 GM/177ML SOLN    ondansetron (Zofran ODT) 4 mg disintegrating tablet    Probiotic Product (PROBIOTIC-10 PO)    Allergies   Allergen Reactions    Prednisone Other (See Comments)     Headaches and blood pressure spikes      Sulfa Antibiotics Hives    Vicodin [Hydrocodone-Acetaminophen] Anxiety    Codeine Hives    Colistin      Other reaction(s): Other (See Comments)  colymycin otic    Other      Other reaction(s):  Other (See Comments)  burn           Objective     Blood pressure 131/98, pulse 66, temperature 98 4 °F (36 9 °C), weight 113 kg (250 lb)  Body mass index is 39 16 kg/m²  PHYSICAL EXAM:      General Appearance:   Alert, cooperative, no distress   HEENT:   Normocephalic, atraumatic, anicteric      Neck:  Supple, symmetrical, trachea midline   Lungs:   Clear to auscultation bilaterally; no rales, rhonchi or wheezing; respirations unlabored    Heart[de-identified]   Regular rate and rhythm; no murmur, rub, or gallop  Abdomen:   Soft, non-tender, non-distended; normal bowel sounds; no masses, no organomegaly    Genitalia:   Deferred    Rectal:   Deferred    Extremities:  No cyanosis, clubbing or edema    Pulses:  2+ and symmetric    Skin:  No jaundice, rashes, or lesions    Lymph nodes:  No palpable cervical lymphadenopathy        Lab Results:   No visits with results within 1 Day(s) from this visit     Latest known visit with results is:   Admission on 03/19/2022, Discharged on 03/19/2022   Component Date Value    WBC 03/19/2022 14 24*    RBC 03/19/2022 6 05*    Hemoglobin 03/19/2022 16 3*    Hematocrit 03/19/2022 51 9*    MCV 03/19/2022 86     MCH 03/19/2022 26 9     MCHC 03/19/2022 31 4     RDW 03/19/2022 14 4     MPV 03/19/2022 9 6     Platelets 80/96/9924 364     nRBC 03/19/2022 0     Neutrophils Relative 03/19/2022 79*    Immat GRANS % 03/19/2022 1     Lymphocytes Relative 03/19/2022 11*    Monocytes Relative 03/19/2022 6     Eosinophils Relative 03/19/2022 3     Basophils Relative 03/19/2022 0     Neutrophils Absolute 03/19/2022 11 30*    Immature Grans Absolute 03/19/2022 0 09     Lymphocytes Absolute 03/19/2022 1 60     Monocytes Absolute 03/19/2022 0 86     Eosinophils Absolute 03/19/2022 0 36     Basophils Absolute 03/19/2022 0 03     Sodium 03/19/2022 139     Potassium 03/19/2022 3 6     Chloride 03/19/2022 106     CO2 03/19/2022 23     ANION GAP 03/19/2022 10     BUN 03/19/2022 8     Creatinine 03/19/2022 0 68     Glucose 03/19/2022 113     Calcium 03/19/2022 9 4     AST 03/19/2022 12*    ALT 03/19/2022 23  Alkaline Phosphatase 03/19/2022 85     Total Protein 03/19/2022 7 1     Albumin 03/19/2022 4 3     Total Bilirubin 03/19/2022 0 51     eGFR 03/19/2022 98     Lipase 03/19/2022 8*    Magnesium 03/19/2022 1 7*    Color, UA 03/19/2022 Yellow     Clarity, UA 03/19/2022 Clear     Specific Gravity, UA 03/19/2022 >=1 030*    pH, UA 03/19/2022 6 0     Leukocytes, UA 03/19/2022 Negative     Nitrite, UA 03/19/2022 Negative     Protein, UA 03/19/2022 Negative     Glucose, UA 03/19/2022 Negative     Ketones, UA 03/19/2022 Trace*    Urobilinogen, UA 03/19/2022 0 2     Bilirubin, UA 03/19/2022 Negative     Blood, UA 03/19/2022 Negative     Salmonella sp PCR 03/19/2022 None Detected     Shigella sp/Enteroinvasi* 03/19/2022 None Detected     Campylobacter sp (jejuni* 03/19/2022 None Detected     Shiga toxin 1/Shiga toxi* 03/19/2022 None Detected          Radiology Results:   No results found

## 2022-03-23 LAB
C DIFF TOX GENS STL QL NAA+PROBE: NEGATIVE
G LAMBLIA AG STL QL IA: NEGATIVE
TTG IGA SER-ACNC: <2 U/ML (ref 0–3)

## 2022-06-01 NOTE — LETTER
February 23, 2022     Patient: Yared Chow   YOB: 1965   Date of Visit: 2/23/2022       To Whom It May Concern: It is my medical opinion that Aurora Rainey   Was seen and evaluated at [facility]  Please note if Covid and flu tests are negative, they may return to work/school when negative covid results are reviewed and/or when fever free for 24 hours without the use of a fever reducing agent  If covid or Flu test is positive, they may return to work/school on 2/28/2022, as this is 5 days from the onset of symptoms  Upon return, they must then adhere to strict masking for an additional 5 days       If you have any questions or concerns, please don't hesitate to call           Sincerely,        JAMES Abreu    CC: Yared Chow
The patient is a 34y Female complaining of urinary symptoms.

## 2022-11-13 ENCOUNTER — OFFICE VISIT (OUTPATIENT)
Dept: URGENT CARE | Facility: CLINIC | Age: 57
End: 2022-11-13

## 2022-11-13 VITALS
SYSTOLIC BLOOD PRESSURE: 149 MMHG | OXYGEN SATURATION: 97 % | RESPIRATION RATE: 18 BRPM | HEART RATE: 78 BPM | TEMPERATURE: 97.8 F | DIASTOLIC BLOOD PRESSURE: 82 MMHG

## 2022-11-13 DIAGNOSIS — J02.9 SORE THROAT: Primary | ICD-10-CM

## 2022-11-13 RX ORDER — AZITHROMYCIN 250 MG/1
TABLET, FILM COATED ORAL
Qty: 6 TABLET | Refills: 0 | Status: SHIPPED | OUTPATIENT
Start: 2022-11-13 | End: 2022-11-17

## 2022-11-13 NOTE — PROGRESS NOTES
[facility]    NAME: Yared Chow is a 65 y o  female  : 1965    MRN: [id number]  DATE: 2022  TIME: 12:02 PM    Assessment and Plan   Sore throat [J02 9]  1  Sore throat  COVID Only -Office Collect    azithromycin (ZITHROMAX) 250 mg tablet       Patient Instructions   Patient Instructions   I have prescribed an antibiotic for the infection  Please take the antibiotic as prescribed and finish the entire prescription  I recommend that the patient takes an over the counter probiotic or eats yogurt with live cultures in it [place]) to keep good bacteria in the gut and help prevent diarrhea  Wash hands frequently to prevent the spread of infection  Can use over the counter cough and cold medications to help with symptoms  Ibuprofen and/or tylenol as needed for pain or fever  If not improving over the next 7-10 days, follow up with PCP  Chief Complaint     Chief Complaint   Patient presents with   • Sore Throat     Pt c/o a sore throat since Friday  History of Present Illness   60-year-old female here with complaint of sore throat, nasal congestion and cough started 2 days ago  Has thick mucus in the back of her throat  Had a low-grade fever the other day  Review of Systems   Review of Systems   Constitutional: Positive for fever  Negative for appetite change and chills  HENT: Positive for congestion and sore throat  Negative for ear discharge, ear pain, facial swelling, postnasal drip, sinus pressure and sneezing  Respiratory: Positive for cough  Negative for shortness of breath and wheezing  Neurological: Negative for headaches         Current Medications     Current Outpatient Medications:   •  azithromycin (ZITHROMAX) 250 mg tablet, Take 2 tablets today then 1 tablet daily x 4 days, Disp: 6 tablet, Rfl: 0  •  Albuterol Sulfate (VENTOLIN HFA IN), Inhale (Patient not taking: Reported on 2021), Disp: , Rfl:   •  cetirizine (ZyrTEC) 10 mg tablet, Take 10 mg by mouth, Disp: , Rfl:   •  clotrimazole-betamethasone (LOTRISONE) 1-0 05 % cream, Apply topically 2 (two) times a day, Disp: , Rfl:   •  CRANBERRY PO, Take by mouth 2 (two) times a day, Disp: , Rfl:   •  famotidine (PEPCID) 20 mg tablet, Take 1 tablet (20 mg total) by mouth 2 (two) times a day (Patient not taking: Reported on 3/26/2021), Disp: 90 tablet, Rfl: 0  •  FLUARIX QUADRIVALENT 0 5 ML SHILO, inject 0 5 milliliter intramuscularly (Patient not taking: Reported on 2021), Disp: , Rfl: 0  •  ondansetron (Zofran ODT) 4 mg disintegrating tablet, Take 1 tablet (4 mg total) by mouth every 6 (six) hours as needed for nausea or vomiting (Patient not taking: Reported on 3/22/2022 ), Disp: 20 tablet, Rfl: 0  •  Probiotic Product (PROBIOTIC-10 PO), Take by mouth (Patient not taking: Reported on 2021), Disp: , Rfl:     Current Allergies     Allergies as of 2022 - Reviewed 2022   Allergen Reaction Noted   • Prednisone Other (See Comments) 2016   • Sulfa antibiotics Hives 2016   • Vicodin [hydrocodone-acetaminophen] Anxiety 2016   • Codeine Hives 2016   • Colistin     • Other            The following portions of the patient's history were reviewed and updated as appropriate: allergies, current medications, past family history, past medical history, past social history, past surgical history and problem list    Past Medical History:   Diagnosis Date   • Asthma due to seasonal allergies    • Kidney stone      Past Surgical History:   Procedure Laterality Date   • APPENDECTOMY  10/1982   •  SECTION      two-  &    • HYSTERECTOMY      partial   • JOINT REPLACEMENT     • REPLACEMENT TOTAL KNEE BILATERAL      right- , left-      Family History   Problem Relation Age of Onset   • Heart disease Father    • Hypertension Father    • Urolithiasis Mother    • Colon cancer Mother    • Kidney cancer Brother    • Urolithiasis Brother    • Hepatitis Brother hep c   • Kidney disease Brother    • Urolithiasis Sister    • Diabetes Sister    • Kidney disease Sister    • Urolithiasis Daughter      Social History     Socioeconomic History   • Marital status: /Civil Union     Spouse name: Not on file   • Number of children: Not on file   • Years of education: Not on file   • Highest education level: Not on file   Occupational History   • Not on file   Tobacco Use   • Smoking status: Never Smoker   • Smokeless tobacco: Never Used   Vaping Use   • Vaping Use: Never used   Substance and Sexual Activity   • Alcohol use: Yes     Comment: occasional   • Drug use: No   • Sexual activity: Not on file   Other Topics Concern   • Not on file   Social History Narrative   • Not on file     Social Determinants of Health     Financial Resource Strain: Not on file   Food Insecurity: Not on file   Transportation Needs: Not on file   Physical Activity: Not on file   Stress: Not on file   Social Connections: Not on file   Intimate Partner Violence: Not on file   Housing Stability: Not on file     Medications have been verified  Objective   /82   Pulse 78   Temp 97 8 °F (36 6 °C)   Resp 18   LMP  (LMP Unknown)   SpO2 97%      Physical Exam   Physical Exam  Vitals and nursing note reviewed  Constitutional:       General: She is not in acute distress  Appearance: She is well-developed  HENT:      Head: Normocephalic and atraumatic  Right Ear: Tympanic membrane normal       Left Ear: Tympanic membrane normal       Nose: Congestion present  No mucosal edema  Right Sinus: No maxillary sinus tenderness or frontal sinus tenderness  Left Sinus: No maxillary sinus tenderness or frontal sinus tenderness  Mouth/Throat:      Pharynx: Pharyngeal swelling and posterior oropharyngeal erythema present  No oropharyngeal exudate  Eyes:      Conjunctiva/sclera: Conjunctivae normal    Cardiovascular:      Rate and Rhythm: Normal rate and regular rhythm  Heart sounds: Normal heart sounds  No murmur heard

## 2022-11-13 NOTE — LETTER
[place] [id number]  Dept: [phone]    November 13, 2022    Patient: Yazmin Medrano  YOB: 1965    Yazmin Medrano was seen and evaluated at our [facility]  Please note if Covid and Flu tests are negative, they may return to work when fever free for 24 hours without the use of a fever reducing agent  If Covid or Flu test is positive, they may return to work on 11/17/22, as this is 5 days from the onset of symptoms  Upon return, they must then adhere to strict masking for an additional 5 days      Sincerely,    Viral Aragon PA-C

## 2022-11-14 LAB — SARS-COV-2 RNA RESP QL NAA+PROBE: NEGATIVE

## 2022-11-21 ENCOUNTER — APPOINTMENT (OUTPATIENT)
Dept: RADIOLOGY | Facility: CLINIC | Age: 57
End: 2022-11-21

## 2022-11-21 ENCOUNTER — OFFICE VISIT (OUTPATIENT)
Dept: URGENT CARE | Facility: CLINIC | Age: 57
End: 2022-11-21

## 2022-11-21 VITALS
BODY MASS INDEX: 41.75 KG/M2 | SYSTOLIC BLOOD PRESSURE: 150 MMHG | OXYGEN SATURATION: 97 % | RESPIRATION RATE: 22 BRPM | DIASTOLIC BLOOD PRESSURE: 90 MMHG | TEMPERATURE: 98 F | WEIGHT: 266 LBS | HEIGHT: 67 IN | HEART RATE: 82 BPM

## 2022-11-21 DIAGNOSIS — J20.8 ACUTE BRONCHITIS DUE TO OTHER SPECIFIED ORGANISMS: Primary | ICD-10-CM

## 2022-11-21 DIAGNOSIS — J20.8 ACUTE BRONCHITIS DUE TO OTHER SPECIFIED ORGANISMS: ICD-10-CM

## 2022-11-21 RX ORDER — ALBUTEROL SULFATE 90 UG/1
2 AEROSOL, METERED RESPIRATORY (INHALATION) EVERY 6 HOURS PRN
Qty: 9 G | Refills: 0 | Status: SHIPPED | OUTPATIENT
Start: 2022-11-21

## 2022-11-21 RX ORDER — LEVOFLOXACIN 750 MG/1
750 TABLET ORAL EVERY 24 HOURS
Qty: 5 TABLET | Refills: 0 | Status: SHIPPED | OUTPATIENT
Start: 2022-11-21 | End: 2022-11-26

## 2022-11-21 RX ORDER — FLUTICASONE PROPIONATE AND SALMETEROL 250; 50 UG/1; UG/1
1 POWDER RESPIRATORY (INHALATION) 2 TIMES DAILY
Qty: 60 BLISTER | Refills: 0 | Status: SHIPPED | OUTPATIENT
Start: 2022-11-21 | End: 2022-12-21

## 2022-11-21 RX ORDER — GUAIFENESIN 100 MG/5ML
200 SYRUP ORAL 3 TIMES DAILY PRN
Qty: 120 ML | Refills: 0 | Status: SHIPPED | OUTPATIENT
Start: 2022-11-21

## 2022-11-21 NOTE — PATIENT INSTRUCTIONS
Your xray was preliminarily read by your provider  A radiologist will read the xray and you will be notified if it is abnormal     You are to do warm salt water gargles 4 x daily  Drink warm tea with honey and lemon  Take tylenol or motrin as able for pain or fever  Chloraseptic throat spray, cough drops  Do not share utensils  Change your tooth brush in 3 days  You are to take the Levaquin, take plain robitussin to bring up mucous from chest  NO dayquil or nyquil  Drink water    Use advair as prescribed and discuss with your PCP   Follow up with your PCP in 2-3 days  Go to the ED if symptoms worsen

## 2022-11-21 NOTE — PROGRESS NOTES
[facility]        NAME: Pau Alva is a 65 y o  female  : 1965    MRN: [id number]  DATE: 2022  TIME: 4:57 PM    Assessment and Plan   Acute bronchitis due to other specified organisms [J20 8]  1  Acute bronchitis due to other specified organisms  XR chest pa & lateral    levofloxacin (LEVAQUIN) 750 mg tablet    Fluticasone-Salmeterol (Advair Diskus) 250-50 mcg/dose inhaler    guaiFENesin (ROBITUSSIN) 100 MG/5ML oral liquid    albuterol (Ventolin HFA) 90 mcg/act inhaler            Patient Instructions       Follow up with PCP in 3-5 days  Proceed to  ER if symptoms worsen  Your xray was preliminarily read by your provider  A radiologist will read the xray and you will be notified if it is abnormal     You are to do warm salt water gargles 4 x daily  Drink warm tea with honey and lemon  Take tylenol or motrin as able for pain or fever  Chloraseptic throat spray, cough drops  Do not share utensils  Change your tooth brush in 3 days  You are to take the Levaquin, take plain robitussin to bring up mucous from chest  NO dayquil or nyquil  Drink water  Use advair as prescribed and discuss with your PCP   Follow up with your PCP in 2-3 days  Go to the ED if symptoms worsen          Chief Complaint     Chief Complaint   Patient presents with   • Cough     X 10 days    • Sore Throat     X 10 days          History of Present Illness       This is a 65year old female who was seen 22 at [facility] now with c/o cough, sorethroat x 10 days  She states she was on a Zpack and taking dayquil and nyquil w/o relief  She states she now feels bronchitis in her chest and she has to take Levaquin for resolution  She states she has allergy induced asthma and generally ends up with bronchitis  She states she is wheezing but unable to take steroids  She denies fevers, chills, v/d   + nausea but thinks it is all the medications she is taking            Review of Systems   Review of Systems Constitutional: Negative  HENT: Positive for sore throat  Eyes: Negative  Respiratory: Positive for cough, chest tightness and wheezing  Cardiovascular: Negative  Gastrointestinal: Negative  Endocrine: Negative  Genitourinary: Negative  Musculoskeletal: Negative  Skin: Negative  Allergic/Immunologic: Negative  Neurological: Negative  Hematological: Negative  Psychiatric/Behavioral: Negative            Current Medications       Current Outpatient Medications:   •  albuterol (Ventolin HFA) 90 mcg/act inhaler, Inhale 2 puffs every 6 (six) hours as needed for wheezing or shortness of breath, Disp: 9 g, Rfl: 0  •  Fluticasone-Salmeterol (Advair Diskus) 250-50 mcg/dose inhaler, Inhale 1 puff 2 (two) times a day Rinse mouth after use , Disp: 60 blister, Rfl: 0  •  guaiFENesin (ROBITUSSIN) 100 MG/5ML oral liquid, Take 10 mL (200 mg total) by mouth 3 (three) times a day as needed for cough, Disp: 120 mL, Rfl: 0  •  levofloxacin (LEVAQUIN) 750 mg tablet, Take 1 tablet (750 mg total) by mouth every 24 hours for 5 days, Disp: 5 tablet, Rfl: 0  •  cetirizine (ZyrTEC) 10 mg tablet, Take 10 mg by mouth, Disp: , Rfl:   •  clotrimazole-betamethasone (LOTRISONE) 1-0 05 % cream, Apply topically 2 (two) times a day, Disp: , Rfl:   •  CRANBERRY PO, Take by mouth 2 (two) times a day, Disp: , Rfl:   •  famotidine (PEPCID) 20 mg tablet, Take 1 tablet (20 mg total) by mouth 2 (two) times a day (Patient not taking: Reported on 3/26/2021), Disp: 90 tablet, Rfl: 0  •  FLUARIX QUADRIVALENT 0 5 ML SHILO, inject 0 5 milliliter intramuscularly (Patient not taking: Reported on 7/13/2021), Disp: , Rfl: 0  •  ondansetron (Zofran ODT) 4 mg disintegrating tablet, Take 1 tablet (4 mg total) by mouth every 6 (six) hours as needed for nausea or vomiting (Patient not taking: Reported on 3/22/2022 ), Disp: 20 tablet, Rfl: 0  •  Probiotic Product (PROBIOTIC-10 PO), Take by mouth (Patient not taking: Reported on 2021), Disp: , Rfl:     Current Allergies     Allergies as of 2022 - Reviewed 2022   Allergen Reaction Noted   • Prednisone Other (See Comments) 2016   • Sulfa antibiotics Hives 2016   • Vicodin [hydrocodone-acetaminophen] Anxiety 2016   • Codeine Hives 2016   • Colistin     • Other              The following portions of the patient's history were reviewed and updated as appropriate: allergies, current medications, past family history, past medical history, past social history, past surgical history and problem list      Past Medical History:   Diagnosis Date   • Asthma due to seasonal allergies    • Kidney stone        Past Surgical History:   Procedure Laterality Date   • APPENDECTOMY  10/1982   •  SECTION      two-  &    • HYSTERECTOMY      partial   • JOINT REPLACEMENT     • REPLACEMENT TOTAL KNEE BILATERAL      right- , left-        Family History   Problem Relation Age of Onset   • Heart disease Father    • Hypertension Father    • Urolithiasis Mother    • Colon cancer Mother    • Kidney cancer Brother    • Urolithiasis Brother    • Hepatitis Brother         hep c   • Kidney disease Brother    • Urolithiasis Sister    • Diabetes Sister    • Kidney disease Sister    • Urolithiasis Daughter          Medications have been verified  Objective   /90   Pulse 82   Temp 98 °F (36 7 °C)   Resp 22   Ht 5' 7" (1 702 m)   Wt 121 kg (266 lb)   LMP  (LMP Unknown)   SpO2 97%   BMI 41 66 kg/m²   No LMP recorded (lmp unknown)  Patient has had a hysterectomy  Physical Exam     Physical Exam  Vitals and nursing note reviewed  Constitutional:       General: She is not in acute distress  Appearance: She is well-developed  She is obese  She is not ill-appearing, toxic-appearing or diaphoretic  HENT:      Head: Normocephalic and atraumatic        Right Ear: Tympanic membrane and ear canal normal       Left Ear: Tympanic membrane and ear canal normal       Nose: Congestion present  No rhinorrhea  Mouth/Throat:      Mouth: Mucous membranes are moist  No oral lesions  Pharynx: Oropharynx is clear  Uvula midline  No pharyngeal swelling, oropharyngeal exudate, posterior oropharyngeal erythema or uvula swelling  Tonsils: No tonsillar exudate or tonsillar abscesses  Comments: Visualization of posterior oropharynx difficulty due to Mallampati of 3-4   Cardiovascular:      Rate and Rhythm: Normal rate and regular rhythm  Heart sounds: Normal heart sounds  No murmur heard  Pulmonary:      Effort: Pulmonary effort is normal  No respiratory distress  Breath sounds: No stridor  Rhonchi present  No wheezing or rales  Comments: Scattered faint rhonchi but worse in LLL   Chest:      Chest wall: No tenderness  Abdominal:      General: Bowel sounds are normal       Palpations: Abdomen is soft  Musculoskeletal:      Cervical back: Normal range of motion and neck supple  Lymphadenopathy:      Cervical: No cervical adenopathy  Skin:     General: Skin is warm and dry  Capillary Refill: Capillary refill takes less than 2 seconds  Neurological:      General: No focal deficit present  Mental Status: She is alert and oriented to person, place, and time  Psychiatric:         Mood and Affect: Mood normal          Behavior: Behavior normal            Preliminary reading CXR  ?  Bronchial enlargement    Waiting on rad read final

## 2022-11-21 NOTE — LETTER
November 21, 2022     Patient: Alie Gamboa   YOB: 1965   Date of Visit: 11/21/2022       To Whom It May Concern: It is my medical opinion that Vee Rodríguez may return to work on 11/23/2022  If you have any questions or concerns, please don't hesitate to call           Sincerely,        [facility] SAILAJAT    CC: No Recipients

## 2023-01-31 ENCOUNTER — TELEPHONE (OUTPATIENT)
Dept: FAMILY MEDICINE CLINIC | Facility: CLINIC | Age: 58
End: 2023-01-31

## 2023-02-01 ENCOUNTER — TELEPHONE (OUTPATIENT)
Dept: ADMINISTRATIVE | Facility: OTHER | Age: 58
End: 2023-02-01

## 2023-02-01 NOTE — TELEPHONE ENCOUNTER
----- Message from Abraham Muniz MA sent at 1/31/2023  1:11 PM EST -----  Regarding: mammo  01/31/23 1:11 PM    Hello, our patient Jan Gonzalez has had Mammogram completed/performed  Please assist in updating the patient chart by pulling the Care Everywhere (CE) document  The date of service is 8/22/22       Thank you,  Abraham Muniz MA  [facility] PRIMARY [facility]

## 2023-02-01 NOTE — TELEPHONE ENCOUNTER
Upon review of the In Basket request we were able to locate, review, and update the patient chart as requested for Mammogram     Any additional questions or concerns should be emailed to the Practice Liaisons via the appropriate education email address, please do not reply via In Basket      Thank you  Natalee Cheung

## 2023-02-22 NOTE — TELEPHONE ENCOUNTER
Pt is a  and said her job pays for her to get an annual with a provider of their choosing, therefore she does not want to get a second one

## 2023-02-23 ENCOUNTER — OFFICE VISIT (OUTPATIENT)
Dept: URGENT CARE | Facility: CLINIC | Age: 58
End: 2023-02-23

## 2023-02-23 VITALS
DIASTOLIC BLOOD PRESSURE: 80 MMHG | HEIGHT: 67 IN | SYSTOLIC BLOOD PRESSURE: 136 MMHG | OXYGEN SATURATION: 98 % | WEIGHT: 274 LBS | TEMPERATURE: 98 F | BODY MASS INDEX: 43 KG/M2 | RESPIRATION RATE: 22 BRPM | HEART RATE: 82 BPM

## 2023-02-23 DIAGNOSIS — Z87.09 HISTORY OF ASTHMA: ICD-10-CM

## 2023-02-23 DIAGNOSIS — E66.01 MORBID OBESITY (HCC): ICD-10-CM

## 2023-02-23 DIAGNOSIS — J22 ACUTE RESPIRATORY INFECTION: Primary | ICD-10-CM

## 2023-02-23 DIAGNOSIS — R05.1 ACUTE COUGH: ICD-10-CM

## 2023-02-23 RX ORDER — AZITHROMYCIN 250 MG/1
TABLET, FILM COATED ORAL
Qty: 6 TABLET | Refills: 0 | Status: SHIPPED | OUTPATIENT
Start: 2023-02-23 | End: 2023-02-27

## 2023-02-23 RX ORDER — ALBUTEROL SULFATE 90 UG/1
2 AEROSOL, METERED RESPIRATORY (INHALATION) EVERY 6 HOURS PRN
Qty: 9 G | Refills: 0 | Status: SHIPPED | OUTPATIENT
Start: 2023-02-23

## 2023-02-23 RX ORDER — FLUTICASONE PROPIONATE AND SALMETEROL 250; 50 UG/1; UG/1
1 POWDER RESPIRATORY (INHALATION) 2 TIMES DAILY
Qty: 60 BLISTER | Refills: 0 | Status: SHIPPED | OUTPATIENT
Start: 2023-02-23 | End: 2023-03-25

## 2023-02-23 NOTE — PATIENT INSTRUCTIONS
You are to take the azithromycin as prescribed - this will cover respiratory and strep throat  You are to use the Advair inhaler for wheezing as prescribed  You are to take Robitussin - plain or plain mucinex for expectoration  Follow up with your PCP in 3-5 days  Go to the ED if symptoms worsen  Take tylenol or motrin as needed for fever or pain  You are to do warm salt water gargles 4 x daily  Drink warm tea with honey and lemon  Take tylenol or motrin as able for pain or fever  Chloraseptic throat spray, cough drops  Do not share utensils  Change your tooth brush in 3 days  Your covid test is most likely too early  You are to test when you have 3-5 days of symptoms  Retest Saturday or Sunday  If your test is +, you must quarantine for 5 days from start of symptoms  You are to call your PCP and inform them of + covid and discuss paxlovid treatment with them if you are interested  Wear a mask as long as sick

## 2023-02-23 NOTE — LETTER
February 23, 2023     Patient: Sandra Wooten   YOB: 1965   Date of Visit: 2/23/2023       To Whom It May Concern: It is my medical opinion that Christian Perdomo may return to work on 2/27/2023  If you have any questions or concerns, please don't hesitate to call           Sincerely,        JAMES Madrigal    CC: No Recipients

## 2023-02-23 NOTE — PROGRESS NOTES
[facility]        NAME: Patricia Peres is a 65 y o  female  : 1965    MRN: [id number]  DATE: 2023  TIME: 1:55 PM    Assessment and Plan   Acute respiratory infection [J22]  1  Acute respiratory infection  azithromycin (ZITHROMAX) 250 mg tablet    Fluticasone-Salmeterol (Advair Diskus) 250-50 mcg/dose inhaler    albuterol (Ventolin HFA) 90 mcg/act inhaler      2  Acute cough  azithromycin (ZITHROMAX) 250 mg tablet    Fluticasone-Salmeterol (Advair Diskus) 250-50 mcg/dose inhaler    albuterol (Ventolin HFA) 90 mcg/act inhaler      3  History of asthma  azithromycin (ZITHROMAX) 250 mg tablet    Fluticasone-Salmeterol (Advair Diskus) 250-50 mcg/dose inhaler    albuterol (Ventolin HFA) 90 mcg/act inhaler      4  Morbid obesity ([address] )              Patient Instructions       Follow up with PCP in 3-5 days  Proceed to  ER if symptoms worsen  You are to take the azithromycin as prescribed - this will cover respiratory and strep throat  You are to use the Advair inhaler for wheezing as prescribed  You are to take Robitussin - plain or plain mucinex for expectoration  Follow up with your PCP in 3-5 days  Go to the ED if symptoms worsen  Take tylenol or motrin as needed for fever or pain  You are to do warm salt water gargles 4 x daily  Drink warm tea with honey and lemon  Take tylenol or motrin as able for pain or fever  Chloraseptic throat spray, cough drops  Do not share utensils  Change your tooth brush in 3 days  Your covid test is most likely too early  You are to test when you have 3-5 days of symptoms  Retest Saturday or   Wear a mask as long as sick  Chief Complaint     Chief Complaint   Patient presents with   • Sore Throat   • Generalized Body Aches   • Cough         History of Present Illness       This is a 65year old female who started last night sorethroat, congestion, cough and some wheezing and body aches  Denies fevers, headaches    She tested herself this am for covid and was negative  She was with people over the weekend who became + covid on Monday  Has hx of asthma and used rescue MDI last night and this AM with some relief  She states that when this happens this becomes bronchitis for her  She denies n/v/d   + nasal drip and scratchy throat  Review of Systems   Review of Systems   Constitutional: Negative  HENT: Positive for congestion and sore throat  Eyes: Negative  Respiratory: Positive for cough  Cardiovascular: Negative  Gastrointestinal: Negative  Endocrine: Negative  Genitourinary: Negative  Musculoskeletal: Positive for myalgias  Allergic/Immunologic: Negative  Neurological: Negative  Hematological: Negative  Psychiatric/Behavioral: Negative            Current Medications       Current Outpatient Medications:   •  albuterol (Ventolin HFA) 90 mcg/act inhaler, Inhale 2 puffs every 6 (six) hours as needed for wheezing or shortness of breath, Disp: 9 g, Rfl: 0  •  azithromycin (ZITHROMAX) 250 mg tablet, Take 2 tablets today then 1 tablet daily x 4 days, Disp: 6 tablet, Rfl: 0  •  cetirizine (ZyrTEC) 10 mg tablet, Take 10 mg by mouth, Disp: , Rfl:   •  Fluticasone-Salmeterol (Advair Diskus) 250-50 mcg/dose inhaler, Inhale 1 puff 2 (two) times a day Rinse mouth after use , Disp: 60 blister, Rfl: 0  •  Probiotic Product (PROBIOTIC-10 PO), Take by mouth, Disp: , Rfl:   •  clotrimazole-betamethasone (LOTRISONE) 1-0 05 % cream, Apply topically 2 (two) times a day, Disp: , Rfl:   •  CRANBERRY PO, Take by mouth 2 (two) times a day, Disp: , Rfl:   •  famotidine (PEPCID) 20 mg tablet, Take 1 tablet (20 mg total) by mouth 2 (two) times a day (Patient not taking: Reported on 3/26/2021), Disp: 90 tablet, Rfl: 0  •  FLUARIX QUADRIVALENT 0 5 ML SHILO, inject 0 5 milliliter intramuscularly (Patient not taking: Reported on 7/13/2021), Disp: , Rfl: 0  •  Fluticasone-Salmeterol (Advair Diskus) 250-50 mcg/dose inhaler, Inhale 1 puff 2 (two) times a day Rinse mouth after use , Disp: 60 blister, Rfl: 0  •  guaiFENesin (ROBITUSSIN) 100 MG/5ML oral liquid, Take 10 mL (200 mg total) by mouth 3 (three) times a day as needed for cough, Disp: 120 mL, Rfl: 0  •  ondansetron (Zofran ODT) 4 mg disintegrating tablet, Take 1 tablet (4 mg total) by mouth every 6 (six) hours as needed for nausea or vomiting (Patient not taking: Reported on 3/22/2022 ), Disp: 20 tablet, Rfl: 0    Current Allergies     Allergies as of 2023 - Reviewed 2023   Allergen Reaction Noted   • Prednisone Other (See Comments) 2016   • Sulfa antibiotics Hives 2016   • Vicodin [hydrocodone-acetaminophen] Anxiety 2016   • Codeine Hives 2016   • Colistin     • Other              The following portions of the patient's history were reviewed and updated as appropriate: allergies, current medications, past family history, past medical history, past social history, past surgical history and problem list      Past Medical History:   Diagnosis Date   • Asthma due to seasonal allergies    • Kidney stone        Past Surgical History:   Procedure Laterality Date   • APPENDECTOMY  10/1982   •  SECTION      two-  &    • HYSTERECTOMY      partial   • JOINT REPLACEMENT     • REPLACEMENT TOTAL KNEE BILATERAL      right- , left-        Family History   Problem Relation Age of Onset   • Heart disease Father    • Hypertension Father    • Urolithiasis Mother    • Colon cancer Mother    • Kidney cancer Brother    • Urolithiasis Brother    • Hepatitis Brother         hep c   • Kidney disease Brother    • Urolithiasis Sister    • Diabetes Sister    • Kidney disease Sister    • Urolithiasis Daughter          Medications have been verified          Objective   /80   Pulse 82   Temp 98 °F (36 7 °C)   Resp 22   Ht 5' 7" (1 702 m)   Wt 124 kg (274 lb)   LMP  (LMP Unknown)   SpO2 98%   BMI 42 91 kg/m²   No LMP recorded (lmp unknown)  Patient has had a hysterectomy  Physical Exam     Physical Exam  Vitals and nursing note reviewed  Constitutional:       General: She is not in acute distress  Appearance: She is well-developed  She is obese  She is not ill-appearing, toxic-appearing or diaphoretic  HENT:      Head: Normocephalic and atraumatic  Right Ear: Tympanic membrane and ear canal normal       Left Ear: Tympanic membrane and ear canal normal       Nose: Congestion and rhinorrhea present  Mouth/Throat:      Mouth: Mucous membranes are moist  No oral lesions  Pharynx: Oropharynx is clear  Uvula midline  Posterior oropharyngeal erythema present  No pharyngeal swelling, oropharyngeal exudate or uvula swelling  Tonsils: No tonsillar exudate or tonsillar abscesses  1+ on the right  1+ on the left  Eyes:      Extraocular Movements:      Right eye: Normal extraocular motion  Left eye: Normal extraocular motion  Cardiovascular:      Rate and Rhythm: Normal rate and regular rhythm  Heart sounds: Normal heart sounds  No murmur heard  Pulmonary:      Effort: Pulmonary effort is normal  No respiratory distress  Breath sounds: Normal breath sounds  No stridor  No wheezing, rhonchi or rales  Chest:      Chest wall: No tenderness  Abdominal:      General: Bowel sounds are normal       Palpations: Abdomen is soft  Musculoskeletal:      Cervical back: Normal range of motion and neck supple  Lymphadenopathy:      Cervical: No cervical adenopathy  Skin:     General: Skin is warm and dry  Capillary Refill: Capillary refill takes less than 2 seconds  Neurological:      General: No focal deficit present  Mental Status: She is alert and oriented to person, place, and time     Psychiatric:         Mood and Affect: Mood normal          Behavior: Behavior normal

## 2023-06-26 ENCOUNTER — OFFICE VISIT (OUTPATIENT)
Dept: FAMILY MEDICINE CLINIC | Facility: CLINIC | Age: 58
End: 2023-06-26
Payer: COMMERCIAL

## 2023-06-26 VITALS
HEART RATE: 77 BPM | SYSTOLIC BLOOD PRESSURE: 126 MMHG | OXYGEN SATURATION: 99 % | WEIGHT: 261.8 LBS | BODY MASS INDEX: 41.09 KG/M2 | HEIGHT: 67 IN | TEMPERATURE: 97.8 F | DIASTOLIC BLOOD PRESSURE: 96 MMHG

## 2023-06-26 DIAGNOSIS — Z76.89 ESTABLISHING CARE WITH NEW DOCTOR, ENCOUNTER FOR: Primary | ICD-10-CM

## 2023-06-26 DIAGNOSIS — K29.00 ACUTE GASTRITIS WITHOUT HEMORRHAGE, UNSPECIFIED GASTRITIS TYPE: ICD-10-CM

## 2023-06-26 DIAGNOSIS — R03.0 ELEVATED BP WITHOUT DIAGNOSIS OF HYPERTENSION: ICD-10-CM

## 2023-06-26 DIAGNOSIS — K57.90 DIVERTICULOSIS: ICD-10-CM

## 2023-06-26 PROCEDURE — 99214 OFFICE O/P EST MOD 30 MIN: CPT | Performed by: STUDENT IN AN ORGANIZED HEALTH CARE EDUCATION/TRAINING PROGRAM

## 2023-06-26 RX ORDER — DIPHENOXYLATE HYDROCHLORIDE AND ATROPINE SULFATE 2.5; .025 MG/1; MG/1
1 TABLET ORAL 4 TIMES DAILY PRN
Qty: 30 TABLET | Refills: 0 | Status: SHIPPED | OUTPATIENT
Start: 2023-06-26

## 2023-06-26 RX ORDER — ONDANSETRON 4 MG/1
4 TABLET, FILM COATED ORAL EVERY 8 HOURS PRN
Qty: 20 TABLET | Refills: 0 | Status: SHIPPED | OUTPATIENT
Start: 2023-06-26

## 2023-06-26 RX ORDER — DICYCLOMINE HYDROCHLORIDE 10 MG/1
10 CAPSULE ORAL
Qty: 30 CAPSULE | Refills: 0 | Status: SHIPPED | OUTPATIENT
Start: 2023-06-26

## 2023-06-26 NOTE — PROGRESS NOTES
Assessment/Plan/Follow up information       Diagnosis ICD-10-CM Associated Orders   1  Establishing care with new doctor, encounter for  Z76 89       2  Elevated BP without diagnosis of hypertension  R03 0       3  BMI 40 0-44 9, adult ([facility] )  Z68 41       4  Diverticulosis  K57 90 Comprehensive metabolic panel     Lipid Panel with Direct LDL reflex     Hemoglobin A1C     TSH, 3rd generation with Free T4 reflex     CBC and differential     Hepatitis C antibody     C-reactive protein      5  Acute gastritis without hemorrhage, unspecified gastritis type  K29 00 Celiac HLA-DQ,blood     dicyclomine (BENTYL) 10 mg capsule     ondansetron (ZOFRAN) 4 mg tablet     diphenoxylate-atropine (LOMOTIL) 2 5-0 025 mg per tablet     Sedimentation rate, automated     H  pylori antibody, IgG         Patient in agreement with the plan, all questions and concerns were answered/addressed  Advised to contact me or the office with any concerns or questions  In the event of an emergency, and unable to contact a provider they are to go to the emergency room  Subjective    HPI: This a pleasant 26-year-old female presents the office for establishment of care and also with same-day issues of nausea and diarrhea  Patient states she has been having approximately 24 hours of watery profound diarrhea and also occasional nausea however denies any vomiting  States she is having difficulty maintaining p o  intake secondary to her symptoms  She denies any fevers chills shortness of breath or chest pain  Patient states she has had this multiple times in the past and it usually self resolved after 3-5 days  Patient reports normal colonoscopy 2021  She also reports unintentional 15 pound weight loss  Review of Systems   Constitutional: Negative for activity change, appetite change, chills, fatigue and fever     HENT: Negative for congestion, dental problem, drooling, ear discharge, ear pain, facial swelling, postnasal drip, rhinorrhea and sinus pain  Eyes: Negative for photophobia, pain, discharge and itching  Respiratory: Negative for apnea, cough, chest tightness and shortness of breath  Cardiovascular: Negative for chest pain and leg swelling  Gastrointestinal: Positive for abdominal pain, diarrhea and nausea  Negative for abdominal distention, anal bleeding and constipation  Endocrine: Negative for cold intolerance, heat intolerance and polydipsia  Genitourinary: Negative for difficulty urinating  Musculoskeletal: Negative for arthralgias, gait problem, joint swelling and myalgias  Skin: Negative for color change and pallor  Allergic/Immunologic: Negative for immunocompromised state  Neurological: Negative for dizziness, seizures, facial asymmetry, weakness, light-headedness, numbness and headaches  Psychiatric/Behavioral: Negative for agitation, behavioral problems, confusion, decreased concentration and dysphoric mood  All other systems reviewed and are negative  Objective    Vitals:    06/26/23 1000   BP: 126/96   Pulse: 77   Temp: 97 8 °F (36 6 °C)   SpO2: 99%         Physical Exam  Vitals and nursing note reviewed  Constitutional:       General: She is not in acute distress  Appearance: She is well-developed  HENT:      Head: Normocephalic and atraumatic  Eyes:      Conjunctiva/sclera: Conjunctivae normal       Pupils: Pupils are equal, round, and reactive to light  Cardiovascular:      Rate and Rhythm: Normal rate and regular rhythm  Heart sounds: Normal heart sounds  No murmur heard  No friction rub  Pulmonary:      Effort: Pulmonary effort is normal       Breath sounds: Normal breath sounds  Abdominal:      General: Bowel sounds are normal       Palpations: Abdomen is soft  Musculoskeletal:         General: Normal range of motion  Cervical back: Normal range of motion and neck supple  Skin:     General: Skin is warm        Capillary Refill: Capillary refill takes "less than 2 seconds  Neurological:      Mental Status: She is alert and oriented to person, place, and time  Motor: No abnormal muscle tone  Coordination: Coordination normal    Psychiatric:         Behavior: Behavior normal          Thought Content: Thought content normal           BMI Counseling: Body mass index is 41 kg/m²  The BMI is above normal  Nutrition recommendations include reducing portion sizes, decreasing overall calorie intake, 3-5 servings of fruits/vegetables daily, reducing fast food intake and consuming healthier snacks  Exercise recommendations include moderate aerobic physical activity for 150 minutes/week, vigorous aerobic physical activity for 75 minutes/week and exercising 3-5 times per week  Portions of the record may have been created with voice recognition software  Occasional wrong word or \"sound a like\" substitutions may have occurred due to the inherent limitations of voice recognition software  Read the chart carefully and recognize, using context, where substitutions have occurred  Contact me with any questions         Marilynn Doherty MD 06/26/23  "

## 2023-06-27 ENCOUNTER — LAB (OUTPATIENT)
Age: 58
End: 2023-06-27
Payer: COMMERCIAL

## 2023-06-27 DIAGNOSIS — K29.00 ACUTE GASTRITIS WITHOUT HEMORRHAGE, UNSPECIFIED GASTRITIS TYPE: ICD-10-CM

## 2023-06-27 DIAGNOSIS — K57.90 DIVERTICULOSIS: ICD-10-CM

## 2023-06-27 LAB
ALBUMIN SERPL BCP-MCNC: 3.6 G/DL (ref 3.5–5)
ALP SERPL-CCNC: 83 U/L (ref 46–116)
ALT SERPL W P-5'-P-CCNC: 30 U/L (ref 12–78)
ANION GAP SERPL CALCULATED.3IONS-SCNC: 3 MMOL/L
AST SERPL W P-5'-P-CCNC: 9 U/L (ref 5–45)
BASOPHILS # BLD AUTO: 0.01 THOUSANDS/ÂΜL (ref 0–0.1)
BASOPHILS NFR BLD AUTO: 0 % (ref 0–1)
BILIRUB SERPL-MCNC: 0.52 MG/DL (ref 0.2–1)
BUN SERPL-MCNC: 14 MG/DL (ref 5–25)
CALCIUM SERPL-MCNC: 9.2 MG/DL (ref 8.3–10.1)
CHLORIDE SERPL-SCNC: 113 MMOL/L (ref 96–108)
CHOLEST SERPL-MCNC: 174 MG/DL
CO2 SERPL-SCNC: 24 MMOL/L (ref 21–32)
CREAT SERPL-MCNC: 0.77 MG/DL (ref 0.6–1.3)
CRP SERPL QL: <3 MG/L
EOSINOPHIL # BLD AUTO: 0.26 THOUSAND/ÂΜL (ref 0–0.61)
EOSINOPHIL NFR BLD AUTO: 3 % (ref 0–6)
ERYTHROCYTE [DISTWIDTH] IN BLOOD BY AUTOMATED COUNT: 14.5 % (ref 11.6–15.1)
ERYTHROCYTE [SEDIMENTATION RATE] IN BLOOD: 11 MM/HOUR (ref 0–29)
EST. AVERAGE GLUCOSE BLD GHB EST-MCNC: 105 MG/DL
GFR SERPL CREATININE-BSD FRML MDRD: 85 ML/MIN/1.73SQ M
GLUCOSE P FAST SERPL-MCNC: 121 MG/DL (ref 65–99)
HBA1C MFR BLD: 5.3 %
HCT VFR BLD AUTO: 48.5 % (ref 34.8–46.1)
HCV AB SER QL: NORMAL
HDLC SERPL-MCNC: 55 MG/DL
HGB BLD-MCNC: 15.4 G/DL (ref 11.5–15.4)
IMM GRANULOCYTES # BLD AUTO: 0.03 THOUSAND/UL (ref 0–0.2)
IMM GRANULOCYTES NFR BLD AUTO: 0 % (ref 0–2)
LDLC SERPL CALC-MCNC: 100 MG/DL (ref 0–100)
LYMPHOCYTES # BLD AUTO: 1.57 THOUSANDS/ÂΜL (ref 0.6–4.47)
LYMPHOCYTES NFR BLD AUTO: 18 % (ref 14–44)
MCH RBC QN AUTO: 26.8 PG (ref 26.8–34.3)
MCHC RBC AUTO-ENTMCNC: 31.8 G/DL (ref 31.4–37.4)
MCV RBC AUTO: 85 FL (ref 82–98)
MONOCYTES # BLD AUTO: 0.65 THOUSAND/ÂΜL (ref 0.17–1.22)
MONOCYTES NFR BLD AUTO: 8 % (ref 4–12)
NEUTROPHILS # BLD AUTO: 6.07 THOUSANDS/ÂΜL (ref 1.85–7.62)
NEUTS SEG NFR BLD AUTO: 71 % (ref 43–75)
NRBC BLD AUTO-RTO: 0 /100 WBCS
PLATELET # BLD AUTO: 283 THOUSANDS/UL (ref 149–390)
PMV BLD AUTO: 9.6 FL (ref 8.9–12.7)
POTASSIUM SERPL-SCNC: 3.7 MMOL/L (ref 3.5–5.3)
PROT SERPL-MCNC: 6.9 G/DL (ref 6.4–8.4)
RBC # BLD AUTO: 5.74 MILLION/UL (ref 3.81–5.12)
SODIUM SERPL-SCNC: 140 MMOL/L (ref 135–147)
T4 FREE SERPL-MCNC: 0.83 NG/DL (ref 0.61–1.12)
TRIGL SERPL-MCNC: 96 MG/DL
TSH SERPL DL<=0.05 MIU/L-ACNC: 6.43 UIU/ML (ref 0.45–4.5)
WBC # BLD AUTO: 8.59 THOUSAND/UL (ref 4.31–10.16)

## 2023-06-27 PROCEDURE — 80061 LIPID PANEL: CPT

## 2023-06-27 PROCEDURE — 84443 ASSAY THYROID STIM HORMONE: CPT

## 2023-06-27 PROCEDURE — 86140 C-REACTIVE PROTEIN: CPT

## 2023-06-27 PROCEDURE — 83036 HEMOGLOBIN GLYCOSYLATED A1C: CPT

## 2023-06-27 PROCEDURE — 36415 COLL VENOUS BLD VENIPUNCTURE: CPT

## 2023-06-27 PROCEDURE — 84439 ASSAY OF FREE THYROXINE: CPT

## 2023-06-27 PROCEDURE — 86677 HELICOBACTER PYLORI ANTIBODY: CPT

## 2023-06-27 PROCEDURE — 86803 HEPATITIS C AB TEST: CPT

## 2023-06-27 PROCEDURE — 85025 COMPLETE CBC W/AUTO DIFF WBC: CPT

## 2023-06-27 PROCEDURE — 80053 COMPREHEN METABOLIC PANEL: CPT

## 2023-06-27 PROCEDURE — 85652 RBC SED RATE AUTOMATED: CPT

## 2023-06-28 LAB — H PYLORI IGG SER IA-ACNC: 0.1 INDEX VALUE (ref 0–0.79)

## 2023-07-11 ENCOUNTER — OFFICE VISIT (OUTPATIENT)
Dept: FAMILY MEDICINE CLINIC | Facility: CLINIC | Age: 58
End: 2023-07-11
Payer: COMMERCIAL

## 2023-07-11 VITALS
WEIGHT: 264 LBS | HEART RATE: 82 BPM | HEIGHT: 67 IN | DIASTOLIC BLOOD PRESSURE: 72 MMHG | BODY MASS INDEX: 41.44 KG/M2 | SYSTOLIC BLOOD PRESSURE: 138 MMHG | OXYGEN SATURATION: 98 % | TEMPERATURE: 98.5 F

## 2023-07-11 DIAGNOSIS — Z71.3 ENCOUNTER FOR WEIGHT LOSS COUNSELING: ICD-10-CM

## 2023-07-11 DIAGNOSIS — Z00.00 ANNUAL PHYSICAL EXAM: Primary | ICD-10-CM

## 2023-07-11 DIAGNOSIS — R63.8 UNABLE TO LOSE WEIGHT: ICD-10-CM

## 2023-07-11 DIAGNOSIS — E03.8 SUBCLINICAL HYPOTHYROIDISM: ICD-10-CM

## 2023-07-11 PROCEDURE — 99214 OFFICE O/P EST MOD 30 MIN: CPT | Performed by: STUDENT IN AN ORGANIZED HEALTH CARE EDUCATION/TRAINING PROGRAM

## 2023-07-11 PROCEDURE — 99396 PREV VISIT EST AGE 40-64: CPT | Performed by: STUDENT IN AN ORGANIZED HEALTH CARE EDUCATION/TRAINING PROGRAM

## 2023-07-11 RX ORDER — MELATONIN
1000 DAILY
COMMUNITY

## 2023-07-11 NOTE — PROGRESS NOTES
[facility]    NAME: Mitchell Last  AGE: 63 y.o. SEX: female  : 1965     DATE: 2023     Assessment and Plan:     Problem List Items Addressed This Visit        Endocrine    Subclinical hypothyroidism    Relevant Medications    semaglutide, 0.25 or 0.5 mg/dose, (Ozempic, 0.25 or 0.5 MG/DOSE,) 2 mg/3 mL injection pen   Other Visit Diagnoses     Annual physical exam    -  Primary    Relevant Medications    semaglutide, 0.25 or 0.5 mg/dose, (Ozempic, 0.25 or 0.5 MG/DOSE,) 2 mg/3 mL injection pen    BMI 40.0-44.9, adult (HCC)        Relevant Medications    semaglutide, 0.25 or 0.5 mg/dose, (Ozempic, 0.25 or 0.5 MG/DOSE,) 2 mg/3 mL injection pen    Other Relevant Orders    Ambulatory Referral to Nutrition Services    Unable to lose weight        Relevant Medications    semaglutide, 0.25 or 0.5 mg/dose, (Ozempic, 0.25 or 0.5 MG/DOSE,) 2 mg/3 mL injection pen    Other Relevant Orders    Ambulatory Referral to Nutrition Services    Encounter for weight loss counseling        Relevant Medications    semaglutide, 0.25 or 0.5 mg/dose, (Ozempic, 0.25 or 0.5 MG/DOSE,) 2 mg/3 mL injection pen          This patient was seen evaluate by myself and noted to have a BMI greater than 30 kg/m² with no comorbidity or a BMI of 27 kg/m² with obesity related comorbidities. We discussed losing weight with diet and activity modifications as well as indication for pharmacotherapy per the newest recommendations from the AAFP. We discussed medications including orlistat, lorcaserin, liraglutide, phentermine/topiramate, naltrexone/bupropion. We also discussed the efficacy of the injectable GLP-1 agonist such as Saundra Pushpa. Additionally we also discussed potential bariatric surgery referral if they have a BMI greater than 40 kg/m² with no comorbidity or BMI greater than 30 kg/m² with significant comorbidities.    At this juncture, patient would benefit from pharmacotherapy in addition to lifestyle modification, to decrease the risk of developing obesity related comorbidities and also to obtain better control of current obesity related comorbidities. Immunizations and preventive care screenings were discussed with patient today. Appropriate education was printed on patient's after visit summary. Counseling:  Alcohol/drug use: discussed moderation in alcohol intake, the recommendations for healthy alcohol use, and avoidance of illicit drug use. · Dental Health: discussed importance of regular tooth brushing, flossing, and dental visits. No follow-ups on file. Chief Complaint:     Chief Complaint   Patient presents with   • Annual Exam      History of Present Illness:     Adult Annual Physical   Patient here for a comprehensive physical exam. The patient reports no problems. Diet and Physical Activity  · Diet/Nutrition: well balanced diet. · Exercise: no formal exercise. Depression Screening  PHQ-2/9 Depression Screening         General Health  · Sleep: sleeps well. · Hearing: normal - bilateral.  · Vision: no vision problems. · Dental: regular dental visits. /GYN Health  · Patient is: postmenopausal  ·      Review of Systems:     Review of Systems   Constitutional: Negative for activity change, appetite change, chills, fatigue and fever. HENT: Negative for congestion, dental problem, drooling, ear discharge, ear pain, facial swelling, postnasal drip, rhinorrhea and sinus pain. Eyes: Negative for photophobia, pain, discharge and itching. Respiratory: Negative for apnea, cough, chest tightness and shortness of breath. Cardiovascular: Negative for chest pain and leg swelling. Gastrointestinal: Negative for abdominal distention, abdominal pain, anal bleeding, constipation, diarrhea and nausea. Endocrine: Negative for cold intolerance, heat intolerance and polydipsia.    Genitourinary: Negative for difficulty urinating. Musculoskeletal: Negative for arthralgias, gait problem, joint swelling and myalgias. Skin: Negative for color change and pallor. Allergic/Immunologic: Negative for immunocompromised state. Neurological: Negative for dizziness, seizures, facial asymmetry, weakness, light-headedness, numbness and headaches. Psychiatric/Behavioral: Negative for agitation, behavioral problems, confusion, decreased concentration and dysphoric mood. All other systems reviewed and are negative.      Past Medical History:     Past Medical History:   Diagnosis Date   • Asthma due to seasonal allergies    • Kidney stone       Past Surgical History:     Past Surgical History:   Procedure Laterality Date   • APPENDECTOMY  10/1982   •  SECTION      two-  &    • HYSTERECTOMY      partial   • JOINT REPLACEMENT     • REPLACEMENT TOTAL KNEE BILATERAL      right- , left-       Social History:     Social History     Socioeconomic History   • Marital status: /Civil Union     Spouse name: None   • Number of children: None   • Years of education: None   • Highest education level: None   Occupational History   • None   Tobacco Use   • Smoking status: Never   • Smokeless tobacco: Never   Vaping Use   • Vaping Use: Never used   Substance and Sexual Activity   • Alcohol use: Yes     Comment: occasional   • Drug use: No   • Sexual activity: None   Other Topics Concern   • None   Social History Narrative   • None     Social Determinants of Health     Financial Resource Strain: Not on file   Food Insecurity: Not on file   Transportation Needs: Not on file   Physical Activity: Not on file   Stress: Not on file   Social Connections: Not on file   Intimate Partner Violence: Not on file   Housing Stability: Not on file      Family History:     Family History   Problem Relation Age of Onset   • Heart disease Father    • Hypertension Father    • Urolithiasis Mother    • Colon cancer Mother    • Kidney cancer Brother    • Urolithiasis Brother    • Hepatitis Brother         hep c   • Kidney disease Brother    • Urolithiasis Sister    • Diabetes Sister    • Kidney disease Sister    • Urolithiasis Daughter       Current Medications:     Current Outpatient Medications   Medication Sig Dispense Refill   • cetirizine (ZyrTEC) 10 mg tablet Take 10 mg by mouth     • cholecalciferol (VITAMIN D3) 1,000 units tablet Take 1,000 Units by mouth daily     • clotrimazole-betamethasone (LOTRISONE) 1-0.05 % cream Apply topically 2 (two) times a day     • CRANBERRY PO Take by mouth 2 (two) times a day     • Probiotic Product (PROBIOTIC-10 PO) Take by mouth     • semaglutide, 0.25 or 0.5 mg/dose, (Ozempic, 0.25 or 0.5 MG/DOSE,) 2 mg/3 mL injection pen Inject 0.75 mL (0.5 mg total) under the skin every 7 days 9 mL 1     No current facility-administered medications for this visit. Allergies: Allergies   Allergen Reactions   • Prednisone Other (See Comments)     Headaches and blood pressure spikes     • Sulfa Antibiotics Hives   • Vicodin [Hydrocodone-Acetaminophen] Anxiety   • Codeine Hives   • Colistin      Other reaction(s): Other (See Comments)  colymycin otic   • Other Itching     Other reaction(s): Other (See Comments)  burn      Physical Exam:     /72 (BP Location: Left arm, Patient Position: Sitting, Cuff Size: Large)   Pulse 82   Temp 98.5 °F (36.9 °C) (Tympanic)   Ht 5' 7" (1.702 m)   Wt 120 kg (264 lb)   LMP  (LMP Unknown)   SpO2 98%   BMI 41.35 kg/m²     Physical Exam  Vitals and nursing note reviewed. Constitutional:       General: She is not in acute distress. Appearance: She is well-developed. She is obese. HENT:      Head: Normocephalic and atraumatic. Right Ear: Tympanic membrane normal.      Left Ear: Tympanic membrane normal.   Eyes:      Conjunctiva/sclera: Conjunctivae normal.      Pupils: Pupils are equal, round, and reactive to light.    Cardiovascular:      Rate and Rhythm: Normal rate and regular rhythm. Heart sounds: Normal heart sounds. No murmur heard. No friction rub. Pulmonary:      Effort: Pulmonary effort is normal.      Breath sounds: Normal breath sounds. Abdominal:      General: Bowel sounds are normal.      Palpations: Abdomen is soft. Musculoskeletal:         General: Normal range of motion. Cervical back: Normal range of motion and neck supple. Skin:     General: Skin is warm. Capillary Refill: Capillary refill takes less than 2 seconds. Neurological:      Mental Status: She is alert and oriented to person, place, and time. Motor: No abnormal muscle tone. Coordination: Coordination normal.   Psychiatric:         Behavior: Behavior normal.         Thought Content:  Thought content normal.          Alfredito Mon MD  [address] [place] [address] Yes

## 2023-07-12 DIAGNOSIS — R63.8 UNABLE TO LOSE WEIGHT: ICD-10-CM

## 2023-07-12 DIAGNOSIS — Z71.3 ENCOUNTER FOR WEIGHT LOSS COUNSELING: ICD-10-CM

## 2023-07-12 DIAGNOSIS — Z00.00 ANNUAL PHYSICAL EXAM: ICD-10-CM

## 2023-07-12 DIAGNOSIS — E03.8 SUBCLINICAL HYPOTHYROIDISM: ICD-10-CM

## 2023-08-02 ENCOUNTER — RA CDI HCC (OUTPATIENT)
Dept: OTHER | Facility: HOSPITAL | Age: 58
End: 2023-08-02

## 2023-08-02 NOTE — PROGRESS NOTES
[facility] [address] [facility] coding opportunities          Chart Reviewed number of suggestions sent to Provider: 1   E66.01    Patients Insurance        Commercial Insurance: [employer]

## 2023-08-09 ENCOUNTER — OFFICE VISIT (OUTPATIENT)
Dept: FAMILY MEDICINE CLINIC | Facility: CLINIC | Age: 58
End: 2023-08-09
Payer: COMMERCIAL

## 2023-08-09 VITALS
HEART RATE: 69 BPM | HEIGHT: 67 IN | WEIGHT: 257 LBS | DIASTOLIC BLOOD PRESSURE: 80 MMHG | OXYGEN SATURATION: 98 % | SYSTOLIC BLOOD PRESSURE: 128 MMHG | BODY MASS INDEX: 40.34 KG/M2

## 2023-08-09 DIAGNOSIS — Z71.3 ENCOUNTER FOR WEIGHT LOSS COUNSELING: Primary | ICD-10-CM

## 2023-08-09 DIAGNOSIS — E66.09 CLASS 2 OBESITY DUE TO EXCESS CALORIES WITHOUT SERIOUS COMORBIDITY WITH BODY MASS INDEX (BMI) OF 39.0 TO 39.9 IN ADULT: ICD-10-CM

## 2023-08-09 DIAGNOSIS — R63.8 UNABLE TO LOSE WEIGHT: ICD-10-CM

## 2023-08-09 PROCEDURE — 99213 OFFICE O/P EST LOW 20 MIN: CPT | Performed by: STUDENT IN AN ORGANIZED HEALTH CARE EDUCATION/TRAINING PROGRAM

## 2023-08-09 NOTE — PROGRESS NOTES
Assessment/Plan/Follow up information       Diagnosis ICD-10-CM Associated Orders   1. Encounter for weight loss counseling  Z71.3       2. Class 2 obesity due to excess calories without serious comorbidity with body mass index (BMI) of 39.0 to 39.9 in adult  E66.09     Z68.39       3. Unable to lose weight  R63.8          Patient has not yet established with nutrition/dietary counseling  Starting weight 264, currently 257. Given she is having weight loss will continue current regiment. Follow-up 1 month      Patient in agreement with the plan, all questions and concerns were answered/addressed. Advised to contact me or the office with any concerns or questions. In the event of an emergency, and unable to contact a provider they are to go to the emergency room. Subjective    HPI: Pleasant 71-year-old female presents the office for medication check. At last visit approximately 1 month ago patient was started on Ozempic 0.5 mg subcutaneous q. 7 days secondary to her elevated BMI and inability to lose weight. Since initiation of the medication patient denies any significant side effects issues or new problems. Review of Systems   Constitutional: Negative for activity change, appetite change, chills, fatigue and fever. HENT: Negative for congestion, dental problem, drooling, ear discharge, ear pain, facial swelling, postnasal drip, rhinorrhea and sinus pain. Eyes: Negative for photophobia, pain, discharge and itching. Respiratory: Negative for apnea, cough, chest tightness and shortness of breath. Cardiovascular: Negative for chest pain and leg swelling. Gastrointestinal: Negative for abdominal distention, abdominal pain, anal bleeding, constipation, diarrhea and nausea. Endocrine: Negative for cold intolerance, heat intolerance and polydipsia. Genitourinary: Negative for difficulty urinating. Musculoskeletal: Negative for arthralgias, gait problem, joint swelling and myalgias. Skin: Negative for color change and pallor. Allergic/Immunologic: Negative for immunocompromised state. Neurological: Negative for dizziness, seizures, facial asymmetry, weakness, light-headedness, numbness and headaches. Psychiatric/Behavioral: Negative for agitation, behavioral problems, confusion, decreased concentration and dysphoric mood. All other systems reviewed and are negative. Objective    Vitals:    08/09/23 1528   BP: 128/80   Pulse: 69   SpO2: 98%         Physical Exam  Vitals and nursing note reviewed. Constitutional:       General: She is not in acute distress. Appearance: She is well-developed. HENT:      Head: Normocephalic and atraumatic. Eyes:      Conjunctiva/sclera: Conjunctivae normal.      Pupils: Pupils are equal, round, and reactive to light. Cardiovascular:      Rate and Rhythm: Normal rate and regular rhythm. Heart sounds: Normal heart sounds. No murmur heard. No friction rub. Pulmonary:      Effort: Pulmonary effort is normal.      Breath sounds: Normal breath sounds. Abdominal:      General: Bowel sounds are normal.      Palpations: Abdomen is soft. Musculoskeletal:         General: Normal range of motion. Cervical back: Normal range of motion and neck supple. Skin:     General: Skin is warm. Capillary Refill: Capillary refill takes less than 2 seconds. Neurological:      Mental Status: She is alert and oriented to person, place, and time. Motor: No abnormal muscle tone. Coordination: Coordination normal.   Psychiatric:         Behavior: Behavior normal.         Thought Content: Thought content normal.            Portions of the record may have been created with voice recognition software. Occasional wrong word or "sound a like" substitutions may have occurred due to the inherent limitations of voice recognition software. Read the chart carefully and recognize, using context, where substitutions have occurred. Contact me with any questions.        Mercy Hawk MD 08/09/23

## 2023-09-13 ENCOUNTER — OFFICE VISIT (OUTPATIENT)
Dept: FAMILY MEDICINE CLINIC | Facility: CLINIC | Age: 58
End: 2023-09-13
Payer: COMMERCIAL

## 2023-09-13 VITALS
OXYGEN SATURATION: 97 % | HEIGHT: 64 IN | BODY MASS INDEX: 41.86 KG/M2 | HEART RATE: 80 BPM | WEIGHT: 245.2 LBS | TEMPERATURE: 98.2 F | SYSTOLIC BLOOD PRESSURE: 130 MMHG | DIASTOLIC BLOOD PRESSURE: 86 MMHG

## 2023-09-13 DIAGNOSIS — R63.8 UNABLE TO LOSE WEIGHT: ICD-10-CM

## 2023-09-13 DIAGNOSIS — Z71.3 ENCOUNTER FOR WEIGHT LOSS COUNSELING: ICD-10-CM

## 2023-09-13 PROCEDURE — 99213 OFFICE O/P EST LOW 20 MIN: CPT | Performed by: STUDENT IN AN ORGANIZED HEALTH CARE EDUCATION/TRAINING PROGRAM

## 2023-09-13 NOTE — PROGRESS NOTES
Assessment/Plan/Follow up information       Diagnosis ICD-10-CM Associated Orders   1. BMI 40.0-44.9, adult ([facility])  Z68.41 semaglutide, 0.25 or 0.5 mg/dose, (Ozempic, 0.25 or 0.5 MG/DOSE,) 2 mg/3 mL injection pen      2. Unable to lose weight  R63.8 semaglutide, 0.25 or 0.5 mg/dose, (Ozempic, 0.25 or 0.5 MG/DOSE,) 2 mg/3 mL injection pen      3. Encounter for weight loss counseling  Z71.3 semaglutide, 0.25 or 0.5 mg/dose, (Ozempic, 0.25 or 0.5 MG/DOSE,) 2 mg/3 mL injection pen      Patient down approximately 12 pounds over the last 5 weeks continue current dosing      Patient in agreement with the plan, all questions and concerns were answered/addressed. Advised to contact me or the office with any concerns or questions. In the event of an emergency, and unable to contact a provider they are to go to the emergency room. Subjective    HPI: Pleasant 55-year-old female presents the office for 1 month weight check after initiation of Ozempic 0.5 mg. She has been on this dose for approximately 1 month. She denies any side effects concerns or issues. Review of Systems   Constitutional: Negative for activity change, appetite change, chills, fatigue and fever. HENT: Negative for congestion, dental problem, drooling, ear discharge, ear pain, facial swelling, postnasal drip, rhinorrhea and sinus pain. Eyes: Negative for photophobia, pain, discharge and itching. Respiratory: Negative for apnea, cough, chest tightness and shortness of breath. Cardiovascular: Negative for chest pain and leg swelling. Gastrointestinal: Negative for abdominal distention, abdominal pain, anal bleeding, constipation, diarrhea and nausea. Endocrine: Negative for cold intolerance, heat intolerance and polydipsia. Genitourinary: Negative for difficulty urinating. Musculoskeletal: Negative for arthralgias, gait problem, joint swelling and myalgias. Skin: Negative for color change and pallor.    Allergic/Immunologic: Negative for immunocompromised state. Neurological: Negative for dizziness, seizures, facial asymmetry, weakness, light-headedness, numbness and headaches. Psychiatric/Behavioral: Negative for agitation, behavioral problems, confusion, decreased concentration and dysphoric mood. All other systems reviewed and are negative. Objective    Vitals:    09/13/23 1451   BP: 130/86   Pulse: 80   Temp: 98.2 °F (36.8 °C)   SpO2: 97%         Physical Exam  Vitals and nursing note reviewed. Constitutional:       General: She is not in acute distress. Appearance: She is well-developed. HENT:      Head: Normocephalic and atraumatic. Eyes:      Conjunctiva/sclera: Conjunctivae normal.      Pupils: Pupils are equal, round, and reactive to light. Cardiovascular:      Rate and Rhythm: Normal rate and regular rhythm. Heart sounds: Normal heart sounds. No murmur heard. No friction rub. Pulmonary:      Effort: Pulmonary effort is normal.      Breath sounds: Normal breath sounds. Abdominal:      General: Bowel sounds are normal.      Palpations: Abdomen is soft. Musculoskeletal:         General: Normal range of motion. Cervical back: Normal range of motion and neck supple. Skin:     General: Skin is warm. Capillary Refill: Capillary refill takes less than 2 seconds. Neurological:      Mental Status: She is alert and oriented to person, place, and time. Motor: No abnormal muscle tone. Coordination: Coordination normal.   Psychiatric:         Behavior: Behavior normal.         Thought Content: Thought content normal.            Portions of the record may have been created with voice recognition software. Occasional wrong word or "sound a like" substitutions may have occurred due to the inherent limitations of voice recognition software. Read the chart carefully and recognize, using context, where substitutions have occurred. Contact me with any questions.        Angelina Mccormick Eli Yepez MD 09/13/23

## 2023-10-20 ENCOUNTER — OFFICE VISIT (OUTPATIENT)
Dept: FAMILY MEDICINE CLINIC | Facility: CLINIC | Age: 58
End: 2023-10-20

## 2023-10-20 VITALS
TEMPERATURE: 97.1 F | HEIGHT: 64 IN | SYSTOLIC BLOOD PRESSURE: 120 MMHG | DIASTOLIC BLOOD PRESSURE: 78 MMHG | WEIGHT: 243 LBS | BODY MASS INDEX: 41.48 KG/M2 | HEART RATE: 70 BPM

## 2023-10-20 DIAGNOSIS — J01.90 ACUTE NON-RECURRENT SINUSITIS, UNSPECIFIED LOCATION: Primary | ICD-10-CM

## 2023-10-20 DIAGNOSIS — J40 BRONCHITIS: ICD-10-CM

## 2023-10-20 RX ORDER — AZITHROMYCIN 250 MG/1
TABLET, FILM COATED ORAL
Qty: 6 TABLET | Refills: 0 | Status: SHIPPED | OUTPATIENT
Start: 2023-10-20 | End: 2023-10-25

## 2023-10-20 RX ORDER — FLUTICASONE PROPIONATE 50 MCG
1 SPRAY, SUSPENSION (ML) NASAL DAILY
Qty: 9.9 ML | Refills: 1 | Status: SHIPPED | OUTPATIENT
Start: 2023-10-20

## 2023-10-20 RX ORDER — ALBUTEROL SULFATE 90 UG/1
2 AEROSOL, METERED RESPIRATORY (INHALATION) EVERY 6 HOURS PRN
Qty: 18 G | Refills: 5 | Status: SHIPPED | OUTPATIENT
Start: 2023-10-20

## 2023-10-20 NOTE — PROGRESS NOTES
Name: Yue Lee      : 1965      MRN: [id number]  Encounter Provider: JAMES Buckley  Encounter Date: 10/20/2023   Encounter department: [address] [facility]    Assessment & Plan     1. Acute non-recurrent sinusitis, unspecified location  -     azithromycin (Zithromax) 250 mg tablet; Take 2 tablets (500 mg total) by mouth daily for 1 day, THEN 1 tablet (250 mg total) daily for 4 days. -     albuterol (Ventolin HFA) 90 mcg/act inhaler; Inhale 2 puffs every 6 (six) hours as needed for wheezing  -     fluticasone (FLONASE) 50 mcg/act nasal spray; 1 spray into each nostril daily    2. Bronchitis           Subjective      Cold symptoms for about the last week she did do a COVID test 1 time this week. Does not like to take decongestants started to get yellow-green discharge. History of sinusitis 1-2 times a year. Also using inhaler no fever chills no shortness of breath no nausea vomiting or diarrhea      Review of Systems    Current Outpatient Medications on File Prior to Visit   Medication Sig    cetirizine (ZyrTEC) 10 mg tablet Take 10 mg by mouth    cholecalciferol (VITAMIN D3) 1,000 units tablet Take 1,000 Units by mouth daily    clotrimazole-betamethasone (LOTRISONE) 1-0.05 % cream Apply topically 2 (two) times a day    CRANBERRY PO Take by mouth 2 (two) times a day    Probiotic Product (PROBIOTIC-10 PO) Take by mouth    semaglutide, 0.25 or 0.5 mg/dose, (Ozempic, 0.25 or 0.5 MG/DOSE,) 2 mg/3 mL injection pen Inject 0.75 mL (0.5 mg total) under the skin every 7 days for 8 doses       Objective     /78 (BP Location: Right leg, Patient Position: Sitting, Cuff Size: Large)   Pulse 70   Temp (!) 97.1 °F (36.2 °C) (Tympanic)   Ht 5' 3.75" (1.619 m)   Wt 110 kg (243 lb)   LMP  (LMP Unknown)   BMI 42.04 kg/m²     Physical Exam  Vitals and nursing note reviewed. Constitutional:       General: She is not in acute distress. Appearance: Normal appearance.  She is not toxic-appearing. HENT:      Head: Normocephalic. Right Ear: Tympanic membrane normal.      Left Ear: Tympanic membrane normal.      Nose: Congestion and rhinorrhea present. Mouth/Throat:      Mouth: Mucous membranes are moist.      Pharynx: Oropharynx is clear. No oropharyngeal exudate. Eyes:      Conjunctiva/sclera: Conjunctivae normal.   Cardiovascular:      Rate and Rhythm: Normal rate and regular rhythm. Heart sounds: Normal heart sounds. Pulmonary:      Effort: Pulmonary effort is normal.      Breath sounds: Normal breath sounds. Musculoskeletal:      Cervical back: No tenderness. Lymphadenopathy:      Cervical: No cervical adenopathy. Skin:     General: Skin is warm and dry. Neurological:      General: No focal deficit present. Mental Status: She is alert and oriented to person, place, and time.    Psychiatric:         Mood and Affect: Mood normal.       JAMES Badillo

## 2023-11-08 ENCOUNTER — RA CDI HCC (OUTPATIENT)
Dept: OTHER | Facility: HOSPITAL | Age: 58
End: 2023-11-08

## 2023-11-15 ENCOUNTER — OFFICE VISIT (OUTPATIENT)
Dept: FAMILY MEDICINE CLINIC | Facility: CLINIC | Age: 58
End: 2023-11-15
Payer: COMMERCIAL

## 2023-11-15 VITALS
TEMPERATURE: 97.6 F | SYSTOLIC BLOOD PRESSURE: 128 MMHG | HEIGHT: 67 IN | BODY MASS INDEX: 37.45 KG/M2 | WEIGHT: 238.6 LBS | DIASTOLIC BLOOD PRESSURE: 78 MMHG

## 2023-11-15 DIAGNOSIS — E66.09 CLASS 2 OBESITY DUE TO EXCESS CALORIES WITHOUT SERIOUS COMORBIDITY WITH BODY MASS INDEX (BMI) OF 39.0 TO 39.9 IN ADULT: ICD-10-CM

## 2023-11-15 DIAGNOSIS — Z71.3 ENCOUNTER FOR WEIGHT LOSS COUNSELING: Primary | ICD-10-CM

## 2023-11-15 PROCEDURE — 99213 OFFICE O/P EST LOW 20 MIN: CPT | Performed by: STUDENT IN AN ORGANIZED HEALTH CARE EDUCATION/TRAINING PROGRAM

## 2023-11-15 NOTE — PROGRESS NOTES
Name: Ramiro Mcgee      : 1965      MRN: [id number]  Encounter Provider: Nemo Rich MD  Encounter Date: 11/15/2023   Encounter department: [address] [facility]    Assessment & Plan     1. Encounter for weight loss counseling    2. Class 2 obesity due to excess calories without serious comorbidity with body mass index (BMI) of 39.0 to 39.9 in adult  -     Semaglutide-Weight Management (WEGOVY) 1 MG/0.5ML; Inject 0.5 mL (1 mg total) under the skin once a week      Weight - Scale 108 kg (238 lb 9.6 oz) 110 kg (243 lb) 111 kg (245 lb 3.2 oz)         Depression Screening and Follow-up Plan: Patient was screened for depression during today's encounter. They screened negative with a PHQ-2 score of 0. Subjective      HPI    63 yof presents for weight check overall she is doing well with no concerns or issues. Review of Systems   Constitutional:  Negative for activity change, appetite change, chills, fatigue and fever. HENT:  Negative for congestion, dental problem, drooling, ear discharge, ear pain, facial swelling, postnasal drip, rhinorrhea and sinus pain. Eyes:  Negative for photophobia, pain, discharge and itching. Respiratory:  Negative for apnea, cough, chest tightness and shortness of breath. Cardiovascular:  Negative for chest pain and leg swelling. Gastrointestinal:  Negative for abdominal distention, abdominal pain, anal bleeding, constipation, diarrhea and nausea. Endocrine: Negative for cold intolerance, heat intolerance and polydipsia. Genitourinary:  Negative for difficulty urinating. Musculoskeletal:  Negative for arthralgias, gait problem, joint swelling and myalgias. Skin:  Negative for color change and pallor. Allergic/Immunologic: Negative for immunocompromised state. Neurological:  Negative for dizziness, seizures, facial asymmetry, weakness, light-headedness, numbness and headaches.    Psychiatric/Behavioral:  Negative for agitation, behavioral problems, confusion, decreased concentration and dysphoric mood. All other systems reviewed and are negative. Current Outpatient Medications on File Prior to Visit   Medication Sig    albuterol (Ventolin HFA) 90 mcg/act inhaler Inhale 2 puffs every 6 (six) hours as needed for wheezing    cetirizine (ZyrTEC) 10 mg tablet Take 10 mg by mouth    cholecalciferol (VITAMIN D3) 1,000 units tablet Take 1,000 Units by mouth daily    clotrimazole-betamethasone (LOTRISONE) 1-0.05 % cream Apply topically 2 (two) times a day    CRANBERRY PO Take by mouth 2 (two) times a day    fluticasone (FLONASE) 50 mcg/act nasal spray 1 spray into each nostril daily    Probiotic Product (PROBIOTIC-10 PO) Take by mouth       Objective     /78 (BP Location: Left arm, Patient Position: Sitting, Cuff Size: Large)   Temp 97.6 °F (36.4 °C) (Tympanic)   Ht 5' 7" (1.702 m)   Wt 108 kg (238 lb 9.6 oz)   LMP  (LMP Unknown)   BMI 37.37 kg/m²     Physical Exam  Vitals and nursing note reviewed. Constitutional:       Appearance: She is obese. She is not ill-appearing or diaphoretic. HENT:      Head: Normocephalic and atraumatic. Right Ear: Tympanic membrane normal.      Left Ear: Tympanic membrane normal.      Nose: Nose normal.      Mouth/Throat:      Mouth: Mucous membranes are moist.   Eyes:      Pupils: Pupils are equal, round, and reactive to light. Cardiovascular:      Rate and Rhythm: Normal rate and regular rhythm. Pulmonary:      Effort: Pulmonary effort is normal.   Abdominal:      General: Abdomen is flat. Musculoskeletal:         General: Normal range of motion. Cervical back: Normal range of motion. Skin:     General: Skin is warm. Capillary Refill: Capillary refill takes less than 2 seconds. Neurological:      General: No focal deficit present. Mental Status: She is alert and oriented to person, place, and time.    Psychiatric:         Mood and Affect: Mood normal.       Zaida Her Radha Alcaraz MD

## 2023-11-16 ENCOUNTER — TELEPHONE (OUTPATIENT)
Dept: FAMILY MEDICINE CLINIC | Facility: CLINIC | Age: 58
End: 2023-11-16

## 2023-11-16 DIAGNOSIS — R63.8 UNABLE TO LOSE WEIGHT: Primary | ICD-10-CM

## 2023-11-16 NOTE — TELEPHONE ENCOUNTER
Patient called stating she was seen yesterday and script was called in but not correct medication. States anayeli was called in however never discussed a medication change. States she is on ozempic only thing that was discussed is increasing the dose. Please advise patient uses [facility] [address] Patient can be reached back at [phone].

## 2023-11-16 NOTE — TELEPHONE ENCOUNTER
Max Jani is Ozempic, it is the same medication but a different brand. These medications are interchangeable and depending on availability the pharmacy may fill 1 over the other.   She can take the medication that was sent the Max Jani 1 mg

## 2023-11-16 NOTE — TELEPHONE ENCOUNTER
Raul Castano is covered by pts plan last time we checked please send that into pharmacy - pt wanted to stay on that

## 2023-11-16 NOTE — TELEPHONE ENCOUNTER
Yes, Hi, my name is Tejal Louis. I had called earlier today for Doctor Janet Cain regarding prescription that he called in to [facility] yesterday. Today's date is November 16th, it's 2:30. They said they were going to get back to me. I haven't heard anything. If someone could please return my call at [phone]. Thank you.

## 2023-11-17 DIAGNOSIS — R63.8 UNABLE TO LOSE WEIGHT: ICD-10-CM

## 2023-11-17 DIAGNOSIS — Z71.3 ENCOUNTER FOR WEIGHT LOSS COUNSELING: ICD-10-CM

## 2023-11-17 RX ORDER — SEMAGLUTIDE 0.68 MG/ML
INJECTION, SOLUTION SUBCUTANEOUS
Qty: 3 ML | Refills: 1 | Status: SHIPPED | OUTPATIENT
Start: 2023-11-17

## 2023-11-27 ENCOUNTER — TELEPHONE (OUTPATIENT)
Dept: FAMILY MEDICINE CLINIC | Facility: CLINIC | Age: 58
End: 2023-11-27

## 2023-11-27 DIAGNOSIS — Z71.3 ENCOUNTER FOR WEIGHT LOSS COUNSELING: ICD-10-CM

## 2023-11-27 DIAGNOSIS — R63.8 UNABLE TO LOSE WEIGHT: ICD-10-CM

## 2023-11-27 NOTE — TELEPHONE ENCOUNTER
Patient called stating she was seen 1.5 weeks ago and wrong script was called in and then got corrected however the wrong dose was called in. States at visit you both talked about an increase in her dose to 1 mg and again 0.25-0.5mg was called in.  States she picked it up cause she was due for injection but would like it corrected and sent to [facility] [address] .

## 2024-01-15 ENCOUNTER — RA CDI HCC (OUTPATIENT)
Dept: OTHER | Facility: HOSPITAL | Age: 59
End: 2024-01-15

## 2024-01-19 ENCOUNTER — OFFICE VISIT (OUTPATIENT)
Dept: FAMILY MEDICINE CLINIC | Facility: CLINIC | Age: 59
End: 2024-01-19
Payer: COMMERCIAL

## 2024-01-19 VITALS
DIASTOLIC BLOOD PRESSURE: 88 MMHG | TEMPERATURE: 96.3 F | OXYGEN SATURATION: 98 % | HEIGHT: 67 IN | BODY MASS INDEX: 36.1 KG/M2 | HEART RATE: 73 BPM | WEIGHT: 230 LBS | SYSTOLIC BLOOD PRESSURE: 118 MMHG

## 2024-01-19 DIAGNOSIS — E66.09 CLASS 2 OBESITY DUE TO EXCESS CALORIES WITHOUT SERIOUS COMORBIDITY WITH BODY MASS INDEX (BMI) OF 39.0 TO 39.9 IN ADULT: ICD-10-CM

## 2024-01-19 DIAGNOSIS — Z71.3 ENCOUNTER FOR WEIGHT LOSS COUNSELING: Primary | ICD-10-CM

## 2024-01-19 DIAGNOSIS — R63.8 UNABLE TO LOSE WEIGHT: ICD-10-CM

## 2024-01-19 PROCEDURE — 99213 OFFICE O/P EST LOW 20 MIN: CPT | Performed by: STUDENT IN AN ORGANIZED HEALTH CARE EDUCATION/TRAINING PROGRAM

## 2024-01-19 NOTE — PROGRESS NOTES
Name: Sara Harrell      : 1965      MRN: [id number]  Encounter Provider: Yomi Hill MD  Encounter Date: 2024   Encounter department: [facility]    Assessment & Plan     1. Encounter for weight loss counseling    2. Unable to lose weight    3. Class 2 obesity due to excess calories without serious comorbidity with body mass index (BMI) of 39.0 to 39.9 in adult         Patient with 2-month weight loss of approximately 8 pounds.    Continue with current regiment     Subjective      HPI    Pleasant 58-year-old female presents the office today for weight check after being on Ozempic.  Current dosing is 1 mg subcu weekly.  Patient denies any problems side effects or concerns.    Review of Systems   Constitutional:  Negative for activity change, appetite change, chills, fatigue and fever.   HENT:  Negative for congestion, dental problem, drooling, ear discharge, ear pain, facial swelling, postnasal drip, rhinorrhea and sinus pain.    Eyes:  Negative for photophobia, pain, discharge and itching.   Respiratory:  Negative for apnea, cough, chest tightness and shortness of breath.    Cardiovascular:  Negative for chest pain and leg swelling.   Gastrointestinal:  Negative for abdominal distention, abdominal pain, anal bleeding, constipation, diarrhea and nausea.   Endocrine: Negative for cold intolerance, heat intolerance and polydipsia.   Genitourinary:  Negative for difficulty urinating.   Musculoskeletal:  Negative for arthralgias, gait problem, joint swelling and myalgias.   Skin:  Negative for color change and pallor.   Allergic/Immunologic: Negative for immunocompromised state.   Neurological:  Negative for dizziness, seizures, facial asymmetry, weakness, light-headedness, numbness and headaches.   Psychiatric/Behavioral:  Negative for agitation, behavioral problems, confusion, decreased concentration and dysphoric mood.    All other systems reviewed and are  "negative.      Current Outpatient Medications on File Prior to Visit   Medication Sig    albuterol (Ventolin HFA) 90 mcg/act inhaler Inhale 2 puffs every 6 (six) hours as needed for wheezing    cetirizine (ZyrTEC) 10 mg tablet Take 10 mg by mouth    clotrimazole-betamethasone (LOTRISONE) 1-0.05 % cream Apply topically 2 (two) times a day    CRANBERRY PO Take by mouth 2 (two) times a day    fluticasone (FLONASE) 50 mcg/act nasal spray 1 spray into each nostril daily    Probiotic Product (PROBIOTIC-10 PO) Take by mouth    semaglutide, 1 mg/dose, (Ozempic) 4 mg/3 mL injection pen Inject 0.75 mL (1 mg total) under the skin once a week    [DISCONTINUED] cholecalciferol (VITAMIN D3) 1,000 units tablet Take 1,000 Units by mouth daily (Patient not taking: Reported on 1/19/2024)       Objective     /88 (BP Location: Left arm, Patient Position: Sitting, Cuff Size: Large)   Pulse 73   Temp (!) 96.3 °F (35.7 °C) (Tympanic)   Ht 5' 7\" (1.702 m)   Wt 104 kg (230 lb)   LMP  (LMP Unknown)   SpO2 98%   BMI 36.02 kg/m²     Physical Exam  Vitals and nursing note reviewed.   HENT:      Head: Normocephalic and atraumatic.      Right Ear: Tympanic membrane normal.      Left Ear: Tympanic membrane normal.      Nose: Nose normal.      Mouth/Throat:      Mouth: Mucous membranes are moist.   Eyes:      Pupils: Pupils are equal, round, and reactive to light.   Cardiovascular:      Rate and Rhythm: Normal rate and regular rhythm.   Pulmonary:      Effort: Pulmonary effort is normal.   Abdominal:      General: Abdomen is flat.   Musculoskeletal:         General: Normal range of motion.      Cervical back: Normal range of motion.   Skin:     General: Skin is warm.      Capillary Refill: Capillary refill takes less than 2 seconds.   Neurological:      General: No focal deficit present.      Mental Status: She is alert and oriented to person, place, and time.   Psychiatric:         Mood and Affect: Mood normal.       Yomi Hill, " MD

## 2024-01-31 DIAGNOSIS — Z71.3 ENCOUNTER FOR WEIGHT LOSS COUNSELING: ICD-10-CM

## 2024-01-31 NOTE — TELEPHONE ENCOUNTER
Yes, Hi, my name is Sara Harrell. My birth date is [birth year]. I'm a patient of Doctor Sergio and I've been trying to get my Ozempic 1 milligram prescription refilled at [facility] in [facility] and they've been out of stock for three weeks. Doctor Sergio did tell me if I called around to other pharmacies and found it in stock, we could transfer my prescription there. I did find it in stock at [facility] in [facility]. The number is [phone]. So if you could please call my prescription for ozempic 1 milligram into the [facility] in [facility] and if someone could please return my call to let me know when you do this so I know when to call to see if it's available. My number is [phone]. Thank you.  You received a voice mail from LAURA GUTIÉRREZ.

## 2024-02-21 PROBLEM — K52.9 GASTROENTERITIS: Status: RESOLVED | Noted: 2019-10-29 | Resolved: 2024-02-21

## 2024-03-23 ENCOUNTER — APPOINTMENT (EMERGENCY)
Dept: CT IMAGING | Facility: HOSPITAL | Age: 59
End: 2024-03-23
Payer: COMMERCIAL

## 2024-03-23 ENCOUNTER — HOSPITAL ENCOUNTER (EMERGENCY)
Facility: HOSPITAL | Age: 59
Discharge: HOME | End: 2024-03-24
Attending: EMERGENCY MEDICINE
Payer: COMMERCIAL

## 2024-03-23 DIAGNOSIS — A08.4 VIRAL GASTROENTERITIS: Primary | ICD-10-CM

## 2024-03-23 LAB
ALBUMIN SERPL BCP-MCNC: 4.4 G/DL (ref 3.5–5)
ALP SERPL-CCNC: 79 U/L (ref 34–104)
ALT SERPL W P-5'-P-CCNC: 10 U/L (ref 7–52)
ANION GAP SERPL CALCULATED.3IONS-SCNC: 9 MMOL/L (ref 4–13)
AST SERPL W P-5'-P-CCNC: 8 U/L (ref 13–39)
BASOPHILS # BLD AUTO: 0.03 THOUSANDS/ÂΜL (ref 0–0.1)
BASOPHILS NFR BLD AUTO: 0 % (ref 0–1)
BILIRUB SERPL-MCNC: 0.65 MG/DL (ref 0.2–1)
BUN SERPL-MCNC: 13 MG/DL (ref 5–25)
CALCIUM SERPL-MCNC: 9.4 MG/DL (ref 8.4–10.2)
CHLORIDE SERPL-SCNC: 105 MMOL/L (ref 96–108)
CO2 SERPL-SCNC: 21 MMOL/L (ref 21–32)
CREAT SERPL-MCNC: 0.82 MG/DL (ref 0.6–1.3)
EOSINOPHIL # BLD AUTO: 0.41 THOUSAND/ÂΜL (ref 0–0.61)
EOSINOPHIL NFR BLD AUTO: 3 % (ref 0–6)
ERYTHROCYTE [DISTWIDTH] IN BLOOD BY AUTOMATED COUNT: 14.5 % (ref 11.6–15.1)
GFR SERPL CREATININE-BSD FRML MDRD: 79 ML/MIN/1.73SQ M
GLUCOSE SERPL-MCNC: 103 MG/DL (ref 65–140)
HCT VFR BLD AUTO: 49.4 % (ref 34.8–46.1)
HGB BLD-MCNC: 15.8 G/DL (ref 11.5–15.4)
IMM GRANULOCYTES # BLD AUTO: 0.05 THOUSAND/UL (ref 0–0.2)
IMM GRANULOCYTES NFR BLD AUTO: 0 % (ref 0–2)
LIPASE SERPL-CCNC: 13 U/L (ref 11–82)
LYMPHOCYTES # BLD AUTO: 1.79 THOUSANDS/ÂΜL (ref 0.6–4.47)
LYMPHOCYTES NFR BLD AUTO: 14 % (ref 14–44)
MCH RBC QN AUTO: 27.3 PG (ref 26.8–34.3)
MCHC RBC AUTO-ENTMCNC: 32 G/DL (ref 31.4–37.4)
MCV RBC AUTO: 85 FL (ref 82–98)
MONOCYTES # BLD AUTO: 0.99 THOUSAND/ÂΜL (ref 0.17–1.22)
MONOCYTES NFR BLD AUTO: 8 % (ref 4–12)
NEUTROPHILS # BLD AUTO: 9.7 THOUSANDS/ÂΜL (ref 1.85–7.62)
NEUTS SEG NFR BLD AUTO: 75 % (ref 43–75)
NRBC BLD AUTO-RTO: 0 /100 WBCS
PLATELET # BLD AUTO: 319 THOUSANDS/UL (ref 149–390)
PMV BLD AUTO: 9.6 FL (ref 8.9–12.7)
POTASSIUM SERPL-SCNC: 3.6 MMOL/L (ref 3.5–5.3)
PROT SERPL-MCNC: 7.1 G/DL (ref 6.4–8.4)
RBC # BLD AUTO: 5.79 MILLION/UL (ref 3.81–5.12)
SODIUM SERPL-SCNC: 135 MMOL/L (ref 135–147)
WBC # BLD AUTO: 12.97 THOUSAND/UL (ref 4.31–10.16)

## 2024-03-23 PROCEDURE — 96361 HYDRATE IV INFUSION ADD-ON: CPT

## 2024-03-23 PROCEDURE — 80053 COMPREHEN METABOLIC PANEL: CPT | Performed by: EMERGENCY MEDICINE

## 2024-03-23 PROCEDURE — 85025 COMPLETE CBC W/AUTO DIFF WBC: CPT | Performed by: EMERGENCY MEDICINE

## 2024-03-23 PROCEDURE — 74177 CT ABD & PELVIS W/CONTRAST: CPT

## 2024-03-23 PROCEDURE — 96374 THER/PROPH/DIAG INJ IV PUSH: CPT

## 2024-03-23 PROCEDURE — 36415 COLL VENOUS BLD VENIPUNCTURE: CPT | Performed by: EMERGENCY MEDICINE

## 2024-03-23 PROCEDURE — 99284 EMERGENCY DEPT VISIT MOD MDM: CPT

## 2024-03-23 PROCEDURE — 96375 TX/PRO/DX INJ NEW DRUG ADDON: CPT

## 2024-03-23 PROCEDURE — 83690 ASSAY OF LIPASE: CPT | Performed by: EMERGENCY MEDICINE

## 2024-03-23 RX ORDER — KETOROLAC TROMETHAMINE 30 MG/ML
15 INJECTION, SOLUTION INTRAMUSCULAR; INTRAVENOUS ONCE
Status: COMPLETED | OUTPATIENT
Start: 2024-03-23 | End: 2024-03-23

## 2024-03-23 RX ORDER — ONDANSETRON 2 MG/ML
4 INJECTION INTRAMUSCULAR; INTRAVENOUS ONCE
Status: COMPLETED | OUTPATIENT
Start: 2024-03-23 | End: 2024-03-23

## 2024-03-23 RX ORDER — DIPHENOXYLATE HCL/ATROPINE 2.5-.025/5
5 LIQUID (ML) ORAL 4 TIMES DAILY PRN
Qty: 60 ML | Refills: 0 | Status: SHIPPED | OUTPATIENT
Start: 2024-03-23 | End: 2024-04-02

## 2024-03-23 RX ADMIN — IOHEXOL 100 ML: 350 INJECTION, SOLUTION INTRAVENOUS at 22:40

## 2024-03-23 RX ADMIN — KETOROLAC TROMETHAMINE 15 MG: 30 INJECTION, SOLUTION INTRAMUSCULAR; INTRAVENOUS at 21:31

## 2024-03-23 RX ADMIN — SODIUM CHLORIDE 1000 ML: 0.9 INJECTION, SOLUTION INTRAVENOUS at 21:33

## 2024-03-23 RX ADMIN — ONDANSETRON 4 MG: 2 INJECTION INTRAMUSCULAR; INTRAVENOUS at 21:31

## 2024-03-24 VITALS
RESPIRATION RATE: 18 BRPM | DIASTOLIC BLOOD PRESSURE: 76 MMHG | TEMPERATURE: 97.7 F | OXYGEN SATURATION: 95 % | SYSTOLIC BLOOD PRESSURE: 144 MMHG | HEART RATE: 76 BPM

## 2024-03-24 PROCEDURE — 99285 EMERGENCY DEPT VISIT HI MDM: CPT | Performed by: EMERGENCY MEDICINE

## 2024-03-24 NOTE — ED PROVIDER NOTES
History  Chief Complaint   Patient presents with    Vomiting     Pt reports vomiting and diarrhea since wednesday     Patient is a 58-year-old female with history of viral gastroenteritis once or twice a year that presents for evaluation of vomiting diarrhea.  Patient says on Wednesday she started to have symptoms of diarrhea and vomiting.  Vomiting is improved but patient's been having diarrhea about once an hour.  Is been watery no evidence of blood in the stool.  Patient with some generalized abdominal cramping associated with her symptoms.  She denies fevers chills rigors chest pain or dyspnea.  She is taken Bentyl and Zofran without much improvement.  Decreased p.o. intake secondary to her symptoms.        Prior to Admission Medications   Prescriptions Last Dose Informant Patient Reported? Taking?   CRANBERRY PO  Self Yes No   Sig: Take by mouth 2 (two) times a day   Probiotic Product (PROBIOTIC-10 PO)  Self Yes No   Sig: Take by mouth   albuterol (Ventolin HFA) 90 mcg/act inhaler   No No   Sig: Inhale 2 puffs every 6 (six) hours as needed for wheezing   cetirizine (ZyrTEC) 10 mg tablet  Self Yes No   Sig: Take 10 mg by mouth   clotrimazole-betamethasone (LOTRISONE) 1-0.05 % cream  Self Yes No   Sig: Apply topically 2 (two) times a day   fluticasone (FLONASE) 50 mcg/act nasal spray   No No   Si spray into each nostril daily   semaglutide, 1 mg/dose, (Ozempic) 4 mg/3 mL injection pen   No No   Sig: Inject 0.75 mL (1 mg total) under the skin once a week      Facility-Administered Medications: None       Past Medical History:   Diagnosis Date    Allergic     Asthma due to seasonal allergies     GERD (gastroesophageal reflux disease)     Kidney stone     Pneumonia     Urinary tract infection        Past Surgical History:   Procedure Laterality Date    APPENDECTOMY  10/1982     SECTION      two-  &     HYSTERECTOMY      partial    JOINT REPLACEMENT      REPLACEMENT TOTAL KNEE BILATERAL       right- 2009, left- 2010       Family History   Problem Relation Age of Onset    Heart disease Father     Hypertension Father     Urolithiasis Mother     Colon cancer Mother     Arthritis Mother     Cancer Mother         Colon cancer    Hearing loss Mother     Kidney cancer Brother     Urolithiasis Brother     Hepatitis Brother         hep c    Kidney disease Brother     Urolithiasis Sister     Diabetes Sister     Kidney disease Sister     Urolithiasis Daughter      I have reviewed and agree with the history as documented.    E-Cigarette/Vaping    E-Cigarette Use Never User      E-Cigarette/Vaping Substances    Nicotine No     THC No     CBD No     Flavoring No     Other No     Unknown No      Social History     Tobacco Use    Smoking status: Never    Smokeless tobacco: Never   Vaping Use    Vaping status: Never Used   Substance Use Topics    Alcohol use: Yes     Alcohol/week: 4.0 standard drinks of alcohol     Types: 2 Glasses of wine, 2 Shots of liquor per week     Comment: occasional    Drug use: No       Review of Systems   Constitutional:  Negative for fever.   HENT:  Negative for sore throat.    Respiratory:  Negative for shortness of breath.    Cardiovascular:  Negative for chest pain.   Gastrointestinal:  Positive for abdominal pain, diarrhea, nausea and vomiting.   Genitourinary:  Negative for dysuria.   Musculoskeletal:  Negative for back pain.   Skin:  Negative for rash.   Neurological:  Negative for light-headedness.   Psychiatric/Behavioral:  Negative for agitation.    All other systems reviewed and are negative.      Physical Exam  Physical Exam  Vitals reviewed.   Constitutional:       General: She is not in acute distress.     Appearance: She is well-developed.   HENT:      Head: Normocephalic.   Eyes:      Pupils: Pupils are equal, round, and reactive to light.   Cardiovascular:      Rate and Rhythm: Normal rate and regular rhythm.      Heart sounds: Normal heart sounds.   Pulmonary:      Effort:  Pulmonary effort is normal.      Breath sounds: Normal breath sounds.   Abdominal:      General: Bowel sounds are normal. There is no distension.      Palpations: Abdomen is soft.      Tenderness: There is abdominal tenderness. There is no guarding.      Comments: Mild diffuse tenderness    Musculoskeletal:         General: No tenderness or deformity. Normal range of motion.      Cervical back: Normal range of motion and neck supple.   Skin:     General: Skin is warm and dry.      Capillary Refill: Capillary refill takes less than 2 seconds.   Neurological:      Mental Status: She is alert and oriented to person, place, and time.      Cranial Nerves: No cranial nerve deficit.      Sensory: No sensory deficit.   Psychiatric:         Behavior: Behavior normal.         Thought Content: Thought content normal.         Judgment: Judgment normal.         Vital Signs  ED Triage Vitals [03/23/24 2034]   Temperature Pulse Respirations Blood Pressure SpO2   97.7 °F (36.5 °C) 81 18 (!) 176/94 96 %      Temp Source Heart Rate Source Patient Position - Orthostatic VS BP Location FiO2 (%)   Temporal Monitor -- Left arm --      Pain Score       No Pain           Vitals:    03/23/24 2145 03/23/24 2215 03/23/24 2230 03/24/24 0022   BP: 159/87 147/70 142/75 144/76   Pulse: 75 74 74 76         Visual Acuity      ED Medications  Medications   ondansetron (ZOFRAN) injection 4 mg (4 mg Intravenous Given 3/23/24 2131)   ketorolac (TORADOL) injection 15 mg (15 mg Intravenous Given 3/23/24 2131)   sodium chloride 0.9 % bolus 1,000 mL (0 mL Intravenous Stopped 3/24/24 0022)   iohexol (OMNIPAQUE) 350 MG/ML injection (SINGLE-DOSE) 100 mL (100 mL Intravenous Given 3/23/24 2240)       Diagnostic Studies  Results Reviewed       Procedure Component Value Units Date/Time    CMP [[id number]]  (Abnormal) Collected: 03/23/24 2133    Lab Status: Final result Specimen: Blood from Arm, Left Updated: 03/23/24 2209     Sodium 135 mmol/L      Potassium 3.6  mmol/L      Chloride 105 mmol/L      CO2 21 mmol/L      ANION GAP 9 mmol/L      BUN 13 mg/dL      Creatinine 0.82 mg/dL      Glucose 103 mg/dL      Calcium 9.4 mg/dL      AST 8 U/L      ALT 10 U/L      Alkaline Phosphatase 79 U/L      Total Protein 7.1 g/dL      Albumin 4.4 g/dL      Total Bilirubin 0.65 mg/dL      eGFR 79 ml/min/1.73sq m     Narrative:      [employer] Kid[employer] guidelines for Chronic Kidney Disease (CKD):     Stage 1 with normal or high GFR (GFR > 90 mL/min/1.73 square meters)    Stage 2 Mild CKD (GFR = 60-89 mL/min/1.73 square meters)    Stage 3A Moderate CKD (GFR = 45-59 mL/min/1.73 square meters)    Stage 3B Moderate CKD (GFR = 30-44 mL/min/1.73 square meters)    Stage 4 Severe CKD (GFR = 15-29 mL/min/1.73 square meters)    Stage 5 End Stage CKD (GFR <15 mL/min/1.73 square meters)  Note: GFR calculation is accurate only with a steady state creatinine    Lipase [[id number]]  (Normal) Collected: 03/23/24 2133    Lab Status: Final result Specimen: Blood from Arm, Left Updated: 03/23/24 2209     Lipase 13 u/L     Clostridium difficile toxin by PCR with EIA [[id number]]     Lab Status: No result Specimen: Stool     Ova and parasite examination [[id number]]     Lab Status: No result Specimen: Stool from Rectum     Stool Enteric Bacterial Panel by PCR [[id number]]     Lab Status: No result Specimen: Stool     CBC and differential [[id number]]  (Abnormal) Collected: 03/23/24 2133    Lab Status: Final result Specimen: Blood from Arm, Left Updated: 03/23/24 2140     WBC 12.97 Thousand/uL      RBC 5.79 Million/uL      Hemoglobin 15.8 g/dL      Hematocrit 49.4 %      MCV 85 fL      MCH 27.3 pg      MCHC 32.0 g/dL      RDW 14.5 %      MPV 9.6 fL      Platelets 319 Thousands/uL      nRBC 0 /100 WBCs      Neutrophils Relative 75 %      Immature Grans % 0 %      Lymphocytes Relative 14 %      Monocytes Relative 8 %      Eosinophils Relative 3 %      Basophils Relative 0 %      Neutrophils Absolute 9.70  Thousands/µL      Absolute Immature Grans 0.05 Thousand/uL      Absolute Lymphocytes 1.79 Thousands/µL      Absolute Monocytes 0.99 Thousand/µL      Eosinophils Absolute 0.41 Thousand/µL      Basophils Absolute 0.03 Thousands/µL     UA w Reflex to Microscopic w Reflex to Culture [[id number]]     Lab Status: No result Specimen: Urine                    CT abdomen pelvis with contrast   Final Result by Ramy Domingo MD (03/23 2342)      No evidence of acute intra-abdominal or pelvic pathology         Workstation performed: [id number]                    Procedures  Procedures         ED Course                                             Medical Decision Making  Patient is a 58-year-old female who presents for evaluation of generalized abdominal pain vomiting diarrhea.  Found to be viral gastroenteritis.  CT scan reviewed and unremarkable.  Hemoglobin white blood cell count slightly elevated indicating mild dehydration.  Otherwise blood work reviewed unremarkable.  Given some Lomotil for likely viral gastroenteritis and follow-up with PCP given.    Amount and/or Complexity of Data Reviewed  Labs: ordered.  Radiology: ordered.    Risk  Prescription drug management.             Disposition  Final diagnoses:   Viral gastroenteritis     Time reflects when diagnosis was documented in both MDM as applicable and the Disposition within this note       Time User Action Codes Description Comment    3/23/2024 11:44 PM Herrera Wolf Add [A08.4] Viral gastroenteritis           ED Disposition       ED Disposition   Discharge    Condition   Stable    Date/Time   Sat Mar 23, 2024 11:44 PM    Comment   Sara Harrell discharge to home/self care.                   Follow-up Information       Follow up With Specialties Details Why Contact Info Additional Information    [facility] Emergency Department Emergency Medicine  If symptoms worsen [address] [place] [address] [phone] [facility]  [facility] Emergency Department, [address], [place] [address]    Yomi Hill MD Family Medicine, Emergency Medicine Schedule an appointment as soon as possible for a visit   [facility]  [place] [address] [phone]               Discharge Medication List as of 3/23/2024 11:47 PM        START taking these medications    Details   diphenoxylate-atropine (LOMOTIL) 2.5-0.025 mg/5 mL liquid Take 5 mL by mouth 4 (four) times a day as needed for diarrhea for up to 10 days, Starting Sat 3/23/2024, Until Tue 4/2/2024 at 2359, Normal           CONTINUE these medications which have NOT CHANGED    Details   albuterol (Ventolin HFA) 90 mcg/act inhaler Inhale 2 puffs every 6 (six) hours as needed for wheezing, Starting Fri 10/20/2023, Normal      cetirizine (ZyrTEC) 10 mg tablet Take 10 mg by mouth, Historical Med      clotrimazole-betamethasone (LOTRISONE) 1-0.05 % cream Apply topically 2 (two) times a day, Starting Thu 8/1/2019, Historical Med      CRANBERRY PO Take by mouth 2 (two) times a day, Historical Med      fluticasone (FLONASE) 50 mcg/act nasal spray 1 spray into each nostril daily, Starting Fri 10/20/2023, Normal      Probiotic Product (PROBIOTIC-10 PO) Take by mouth, Historical Med      semaglutide, 1 mg/dose, (Ozempic) 4 mg/3 mL injection pen Inject 0.75 mL (1 mg total) under the skin once a week, Starting Wed 1/31/2024, Normal             No discharge procedures on file.    PDMP Review       None            ED Provider  Electronically Signed by             Herrera Wolf MD  03/24/24 [id number]

## 2024-03-25 ENCOUNTER — VBI (OUTPATIENT)
Dept: FAMILY MEDICINE CLINIC | Facility: CLINIC | Age: 59
End: 2024-03-25

## 2024-03-25 NOTE — TELEPHONE ENCOUNTER
03/25/24 10:59 AM    Patient contacted post ED visit, [facility] department spoke with patient/caregiver and outreach was successful.    Thank you.  Sulma Galvan  [facility]

## 2024-03-26 ENCOUNTER — HOSPITAL ENCOUNTER (EMERGENCY)
Facility: HOSPITAL | Age: 59
Discharge: HOME | End: 2024-03-26
Attending: EMERGENCY MEDICINE
Payer: COMMERCIAL

## 2024-03-26 VITALS
SYSTOLIC BLOOD PRESSURE: 148 MMHG | RESPIRATION RATE: 18 BRPM | DIASTOLIC BLOOD PRESSURE: 96 MMHG | HEART RATE: 78 BPM | WEIGHT: 230 LBS | OXYGEN SATURATION: 100 % | TEMPERATURE: 97 F | BODY MASS INDEX: 36.02 KG/M2

## 2024-03-26 DIAGNOSIS — A08.4 VIRAL GASTROENTERITIS: Primary | ICD-10-CM

## 2024-03-26 LAB
ALBUMIN SERPL BCP-MCNC: 4.2 G/DL (ref 3.5–5)
ALP SERPL-CCNC: 73 U/L (ref 34–104)
ALT SERPL W P-5'-P-CCNC: 11 U/L (ref 7–52)
ANION GAP SERPL CALCULATED.3IONS-SCNC: 10 MMOL/L (ref 4–13)
AST SERPL W P-5'-P-CCNC: 9 U/L (ref 13–39)
BASOPHILS # BLD AUTO: 0.03 THOUSANDS/ÂΜL (ref 0–0.1)
BASOPHILS NFR BLD AUTO: 0 % (ref 0–1)
BILIRUB SERPL-MCNC: 0.53 MG/DL (ref 0.2–1)
BUN SERPL-MCNC: 13 MG/DL (ref 5–25)
CALCIUM SERPL-MCNC: 9.3 MG/DL (ref 8.4–10.2)
CHLORIDE SERPL-SCNC: 105 MMOL/L (ref 96–108)
CO2 SERPL-SCNC: 24 MMOL/L (ref 21–32)
CREAT SERPL-MCNC: 0.76 MG/DL (ref 0.6–1.3)
EOSINOPHIL # BLD AUTO: 0.26 THOUSAND/ÂΜL (ref 0–0.61)
EOSINOPHIL NFR BLD AUTO: 3 % (ref 0–6)
ERYTHROCYTE [DISTWIDTH] IN BLOOD BY AUTOMATED COUNT: 14.1 % (ref 11.6–15.1)
GFR SERPL CREATININE-BSD FRML MDRD: 87 ML/MIN/1.73SQ M
GLUCOSE SERPL-MCNC: 93 MG/DL (ref 65–140)
HCT VFR BLD AUTO: 47 % (ref 34.8–46.1)
HGB BLD-MCNC: 15.2 G/DL (ref 11.5–15.4)
IMM GRANULOCYTES # BLD AUTO: 0.03 THOUSAND/UL (ref 0–0.2)
IMM GRANULOCYTES NFR BLD AUTO: 0 % (ref 0–2)
LIPASE SERPL-CCNC: 16 U/L (ref 11–82)
LYMPHOCYTES # BLD AUTO: 1.5 THOUSANDS/ÂΜL (ref 0.6–4.47)
LYMPHOCYTES NFR BLD AUTO: 16 % (ref 14–44)
MCH RBC QN AUTO: 27.5 PG (ref 26.8–34.3)
MCHC RBC AUTO-ENTMCNC: 32.3 G/DL (ref 31.4–37.4)
MCV RBC AUTO: 85 FL (ref 82–98)
MONOCYTES # BLD AUTO: 0.64 THOUSAND/ÂΜL (ref 0.17–1.22)
MONOCYTES NFR BLD AUTO: 7 % (ref 4–12)
NEUTROPHILS # BLD AUTO: 6.74 THOUSANDS/ÂΜL (ref 1.85–7.62)
NEUTS SEG NFR BLD AUTO: 74 % (ref 43–75)
NRBC BLD AUTO-RTO: 0 /100 WBCS
PLATELET # BLD AUTO: 283 THOUSANDS/UL (ref 149–390)
PMV BLD AUTO: 9.4 FL (ref 8.9–12.7)
POTASSIUM SERPL-SCNC: 3.5 MMOL/L (ref 3.5–5.3)
PROT SERPL-MCNC: 6.9 G/DL (ref 6.4–8.4)
RBC # BLD AUTO: 5.52 MILLION/UL (ref 3.81–5.12)
SODIUM SERPL-SCNC: 139 MMOL/L (ref 135–147)
WBC # BLD AUTO: 9.2 THOUSAND/UL (ref 4.31–10.16)

## 2024-03-26 PROCEDURE — 87493 C DIFF AMPLIFIED PROBE: CPT

## 2024-03-26 PROCEDURE — 85025 COMPLETE CBC W/AUTO DIFF WBC: CPT

## 2024-03-26 PROCEDURE — 99284 EMERGENCY DEPT VISIT MOD MDM: CPT

## 2024-03-26 PROCEDURE — 80053 COMPREHEN METABOLIC PANEL: CPT

## 2024-03-26 PROCEDURE — 96375 TX/PRO/DX INJ NEW DRUG ADDON: CPT

## 2024-03-26 PROCEDURE — 36415 COLL VENOUS BLD VENIPUNCTURE: CPT

## 2024-03-26 PROCEDURE — 96361 HYDRATE IV INFUSION ADD-ON: CPT

## 2024-03-26 PROCEDURE — 87505 NFCT AGENT DETECTION GI: CPT

## 2024-03-26 PROCEDURE — 83690 ASSAY OF LIPASE: CPT

## 2024-03-26 PROCEDURE — 96374 THER/PROPH/DIAG INJ IV PUSH: CPT

## 2024-03-26 RX ORDER — KETOROLAC TROMETHAMINE 30 MG/ML
15 INJECTION, SOLUTION INTRAMUSCULAR; INTRAVENOUS ONCE
Status: COMPLETED | OUTPATIENT
Start: 2024-03-26 | End: 2024-03-26

## 2024-03-26 RX ORDER — ONDANSETRON 2 MG/ML
4 INJECTION INTRAMUSCULAR; INTRAVENOUS ONCE
Status: COMPLETED | OUTPATIENT
Start: 2024-03-26 | End: 2024-03-26

## 2024-03-26 RX ADMIN — ONDANSETRON 4 MG: 2 INJECTION INTRAMUSCULAR; INTRAVENOUS at 09:43

## 2024-03-26 RX ADMIN — SODIUM CHLORIDE 500 ML: 0.9 INJECTION, SOLUTION INTRAVENOUS at 10:52

## 2024-03-26 RX ADMIN — KETOROLAC TROMETHAMINE 15 MG: 30 INJECTION, SOLUTION INTRAMUSCULAR; INTRAVENOUS at 09:43

## 2024-03-26 RX ADMIN — SODIUM CHLORIDE 1000 ML: 0.9 INJECTION, SOLUTION INTRAVENOUS at 09:44

## 2024-03-26 NOTE — DISCHARGE INSTRUCTIONS
Immediately return to the emergency room if you experience any new or worsening symptoms or if the symptoms are lasting longer than expected.     Please follow-up with your family doctor to discuss your ER visit today. Please follow a BRAT diet until you start to feel better.

## 2024-03-26 NOTE — ED PROVIDER NOTES
"History  Chief Complaint   Patient presents with    Abdominal Pain     N/v/d     Patient is a 58-year-old female with relevant past medical history of GERD, kidney stone, pneumonia, UTI, and viral gastroenteritis presenting with generalized abdominal pain, nausea, vomiting, and diarrhea ongoing for the last week. Patient reports \"I was just here on Saturday for the same symptoms.\" She had an unremarkable abdominal CT scan at that time. She reports abdominal pain, nausea, vomiting, and diarrhea since last Wednesday. This improved over the weekend. She ate a full meal last night including chicken breast that she thawed from the freezer. She then had worsening diarrhea over the course of the night. She took Lomotil as prescribed. She reports 1 episode of nonbloody, nonbilious emesis this morning. She reports 2 sips of Gatorade this morning. She works at [employer] and went to school yesterday because she was feeling better. She reports the IV fluids were the only thing that made her feel significantly better. She returns today for IV fluids and evaluation. She complains of 6/10 achy generalized abdominal pain. She does not have her appendix. She denies any recent travel. She started Ozempic in August and has not had any issues with that; her dose was last increased in December. She reports an episode of chills last evening. She reports weakness since eating less. Patient denies fever, headache, dizziness, lightheadedness, visual changes, chest pain, shortness of breath, constipation, dysuria, or other urinary symptoms. She presents with her .                History provided by:  Patient and spouse   used: No    Abdominal Pain  Associated symptoms: diarrhea, nausea and vomiting    Associated symptoms: no chest pain, no chills, no constipation, no cough, no dysuria, no fever, no hematuria, no shortness of breath and no sore throat        Prior to Admission Medications   Prescriptions Last Dose " Informant Patient Reported? Taking?   CRANBERRY PO  Self Yes No   Sig: Take by mouth 2 (two) times a day   Probiotic Product (PROBIOTIC-10 PO)  Self Yes No   Sig: Take by mouth   albuterol (Ventolin HFA) 90 mcg/act inhaler   No No   Sig: Inhale 2 puffs every 6 (six) hours as needed for wheezing   cetirizine (ZyrTEC) 10 mg tablet  Self Yes No   Sig: Take 10 mg by mouth   clotrimazole-betamethasone (LOTRISONE) 1-0.05 % cream  Self Yes No   Sig: Apply topically 2 (two) times a day   diphenoxylate-atropine (LOMOTIL) 2.5-0.025 mg/5 mL liquid 3/26/2024  No Yes   Sig: Take 5 mL by mouth 4 (four) times a day as needed for diarrhea for up to 10 days   fluticasone (FLONASE) 50 mcg/act nasal spray   No No   Si spray into each nostril daily   semaglutide, 1 mg/dose, (Ozempic) 4 mg/3 mL injection pen   No No   Sig: Inject 0.75 mL (1 mg total) under the skin once a week      Facility-Administered Medications: None       Past Medical History:   Diagnosis Date    Allergic     Asthma due to seasonal allergies     GERD (gastroesophageal reflux disease)     Kidney stone     Pneumonia     Urinary tract infection        Past Surgical History:   Procedure Laterality Date    APPENDECTOMY  10/1982     SECTION      two-  &     HYSTERECTOMY  2000    partial    JOINT REPLACEMENT      REPLACEMENT TOTAL KNEE BILATERAL      right- , left-        Family History   Problem Relation Age of Onset    Heart disease Father     Hypertension Father     Urolithiasis Mother     Colon cancer Mother     Arthritis Mother     Cancer Mother         Colon cancer    Hearing loss Mother     Kidney cancer Brother     Urolithiasis Brother     Hepatitis Brother         hep c    Kidney disease Brother     Urolithiasis Sister     Diabetes Sister     Kidney disease Sister     Urolithiasis Daughter      I have reviewed and agree with the history as documented.    E-Cigarette/Vaping    E-Cigarette Use Never User      E-Cigarette/Vaping  Substances    Nicotine No     THC No     CBD No     Flavoring No     Other No     Unknown No      Social History     Tobacco Use    Smoking status: Never    Smokeless tobacco: Never   Vaping Use    Vaping status: Never Used   Substance Use Topics    Alcohol use: Yes     Alcohol/week: 4.0 standard drinks of alcohol     Types: 2 Glasses of wine, 2 Shots of liquor per week     Comment: occasional    Drug use: No       Review of Systems   Constitutional:  Positive for appetite change. Negative for chills and fever.   HENT:  Negative for congestion, ear pain, rhinorrhea and sore throat.    Eyes:  Negative for pain and visual disturbance.   Respiratory:  Negative for cough and shortness of breath.    Cardiovascular:  Negative for chest pain and palpitations.   Gastrointestinal:  Positive for abdominal pain, diarrhea, nausea and vomiting. Negative for constipation.   Genitourinary:  Negative for dysuria, frequency, hematuria and urgency.   Musculoskeletal:  Negative for arthralgias and back pain.   Skin:  Negative for color change and rash.   Neurological:  Positive for weakness. Negative for dizziness, seizures, syncope, light-headedness and headaches.   Psychiatric/Behavioral:  Negative for agitation and confusion.        Physical Exam  Physical Exam  Vitals and nursing note reviewed.   Constitutional:       General: She is not in acute distress.     Appearance: She is well-developed. She is not ill-appearing or diaphoretic.   HENT:      Head: Normocephalic and atraumatic.   Eyes:      Conjunctiva/sclera: Conjunctivae normal.   Cardiovascular:      Rate and Rhythm: Normal rate and regular rhythm.      Heart sounds: No murmur heard.  Pulmonary:      Effort: Pulmonary effort is normal. No respiratory distress.      Breath sounds: Normal breath sounds. No wheezing, rhonchi or rales.   Abdominal:      General: Bowel sounds are normal.      Palpations: Abdomen is soft.      Tenderness: There is generalized abdominal  tenderness. There is no guarding or rebound. Negative signs include Marcano's sign.   Musculoskeletal:         General: No swelling.      Cervical back: Neck supple.   Skin:     General: Skin is warm and dry.      Capillary Refill: Capillary refill takes less than 2 seconds.   Neurological:      General: No focal deficit present.      Mental Status: She is alert and oriented to person, place, and time.   Psychiatric:         Mood and Affect: Mood normal.         Vital Signs  ED Triage Vitals   Temperature Pulse Respirations Blood Pressure SpO2   03/26/24 0904 03/26/24 0908 03/26/24 0904 03/26/24 0908 03/26/24 0904   (!) 97 °F (36.1 °C) 78 18 148/96 100 %      Temp Source Heart Rate Source Patient Position - Orthostatic VS BP Location FiO2 (%)   03/26/24 0904 03/26/24 0908 -- -- --   Temporal Monitor         Pain Score       03/26/24 0904       10 - Worst Possible Pain           Vitals:    03/26/24 0908   BP: 148/96   Pulse: 78         Visual Acuity      ED Medications  Medications   ondansetron (ZOFRAN) injection 4 mg (4 mg Intravenous Given 3/26/24 0943)   ketorolac (TORADOL) injection 15 mg (15 mg Intravenous Given 3/26/24 0943)   sodium chloride 0.9 % bolus 1,000 mL (0 mL Intravenous Stopped 3/26/24 1032)   sodium chloride 0.9 % bolus 500 mL (0 mL Intravenous Stopped 3/26/24 1139)       Diagnostic Studies  Results Reviewed       Procedure Component Value Units Date/Time    Stool Enteric Bacterial Panel by PCR [[id number]] Collected: 03/26/24 1137    Lab Status: In process Specimen: Stool from Rectum Updated: 03/26/24 1141    Clostridium difficile toxin by PCR with EIA [[id number]] Collected: 03/26/24 1137    Lab Status: In process Specimen: Stool from Rectum Updated: 03/26/24 1141    CMP [[id number]]  (Abnormal) Collected: 03/26/24 0945    Lab Status: Final result Specimen: Blood from Arm, Right Updated: 03/26/24 1004     Sodium 139 mmol/L      Potassium 3.5 mmol/L      Chloride 105 mmol/L      CO2 24 mmol/L       ANION GAP 10 mmol/L      BUN 13 mg/dL      Creatinine 0.76 mg/dL      Glucose 93 mg/dL      Calcium 9.3 mg/dL      AST 9 U/L      ALT 11 U/L      Alkaline Phosphatase 73 U/L      Total Protein 6.9 g/dL      Albumin 4.2 g/dL      Total Bilirubin 0.53 mg/dL      eGFR 87 ml/min/1.73sq m     Narrative:      National Kidney Disease Foundation guidelines for Chronic Kidney Disease (CKD):     Stage 1 with normal or high GFR (GFR > 90 mL/min/1.73 square meters)    Stage 2 Mild CKD (GFR = 60-89 mL/min/1.73 square meters)    Stage 3A Moderate CKD (GFR = 45-59 mL/min/1.73 square meters)    Stage 3B Moderate CKD (GFR = 30-44 mL/min/1.73 square meters)    Stage 4 Severe CKD (GFR = 15-29 mL/min/1.73 square meters)    Stage 5 End Stage CKD (GFR <15 mL/min/1.73 square meters)  Note: GFR calculation is accurate only with a steady state creatinine    Lipase [[id number]]  (Normal) Collected: 03/26/24 0945    Lab Status: Final result Specimen: Blood from Arm, Right Updated: 03/26/24 1004     Lipase 16 u/L     CBC and differential [[id number]]  (Abnormal) Collected: 03/26/24 0945    Lab Status: Final result Specimen: Blood from Arm, Right Updated: 03/26/24 0949     WBC 9.20 Thousand/uL      RBC 5.52 Million/uL      Hemoglobin 15.2 g/dL      Hematocrit 47.0 %      MCV 85 fL      MCH 27.5 pg      MCHC 32.3 g/dL      RDW 14.1 %      MPV 9.4 fL      Platelets 283 Thousands/uL      nRBC 0 /100 WBCs      Neutrophils Relative 74 %      Immature Grans % 0 %      Lymphocytes Relative 16 %      Monocytes Relative 7 %      Eosinophils Relative 3 %      Basophils Relative 0 %      Neutrophils Absolute 6.74 Thousands/µL      Absolute Immature Grans 0.03 Thousand/uL      Absolute Lymphocytes 1.50 Thousands/µL      Absolute Monocytes 0.64 Thousand/µL      Eosinophils Absolute 0.26 Thousand/µL      Basophils Absolute 0.03 Thousands/µL                    No orders to display              Procedures  Procedures         ED Course  ED Course as of 03/26/24  1254   Tue Mar 26, 2024   0907 Vital signs reviewed and within normal limits.  Patient has no SIRS criteria.   0909 CT abdomen/pelvis w/ contrast from 3/23/24:  IMPRESSION:  No evidence of acute intra-abdominal or pelvic pathology.     0950 WBC: 9.20  WBC markedly improved since she was here on Saturday. No anemia or platelet abnormality.   1031 CMP(!)  Electrolytes within normal limits. No GRADY or glucose abnormality. No transaminitis.   1031 LIPASE: 16  Lipase within normal limits.   1036 Went over results with patient. Her stomach is feeling much better. She no longer has the abdominal pain. No BMs or emesis since arrival. She thinks she might be able to use the bathroom now. She will try to give a stool sample. Will give another 500cc IV fluids and p.o. challenge.    1056 RN reports patient had a bowel movement but there was urine in it. We will try again in a little bit.   1129 Attempting p.o. challenge.   1210 Patient kept the liquids and food down. Patient would like to go home. Will discharge home with supportive care instructions.                               SBIRT 22yo+      Flowsheet Row Most Recent Value   Initial Alcohol Screen: US AUDIT-C     1. How often do you have a drink containing alcohol? 0 Filed at: 03/26/2024 0907   2. How many drinks containing alcohol do you have on a typical day you are drinking?  0 Filed at: 03/26/2024 0907   3a. Male UNDER 65: How often do you have five or more drinks on one occasion? 0 Filed at: 03/26/2024 0907   3b. FEMALE Any Age, or MALE 65+: How often do you have 4 or more drinks on one occassion? 0 Filed at: 03/26/2024 0907   Audit-C Score 0 Filed at: 03/26/2024 0907   GABINO: How many times in the past year have you...    Used an illegal drug or used a prescription medication for non-medical reasons? Never Filed at: 03/26/2024 0907                      Medical Decision Making  Patient is a 58-year-old female with relevant past medical history of GERD, kidney stone,  pneumonia, UTI, and viral gastroenteritis presenting with generalized abdominal pain, nausea, vomiting, and diarrhea ongoing for the last week. Patient is non ill-appearing and has mild diffuse abdominal tenderness on examination.  Will repeat abdominal labs to ensure her illness is not getting worse. Her symptoms are concurrent with viral gastroenteritis. Will reattempt stool cultures this time. Patient agrees that a CT abdomen/pelvis is unnecessary as she just had one.   See ED course for interpretation of labs, imaging, and further medical decision making.   Will treat her symptoms as requested. Zofran 4 mg IV given for the nausea and vomiting. Toradol 15 mg IV given for generalized abdominal pain. IV fluids for hydration and at the patient's request.  Provided patient with supportive care instructions and advised to follow a brat diet until she is feeling better. Will call her if her stool cultures are abnormal. Provided supportive care instructions for home.  Dispo: Patient discharged home with strict return precautions. Advised patient to return to the nearest emergency room if she has new or worsening symptoms. Advised patient to follow-up with her family doctor. Patient is satisfied with care and agrees with management and plan.     Amount and/or Complexity of Data Reviewed  External Data Reviewed: labs, radiology and notes.  Labs: ordered. Decision-making details documented in ED Course.  Radiology:  Decision-making details documented in ED Course.    Risk  Prescription drug management.             Disposition  Final diagnoses:   Viral gastroenteritis     Time reflects when diagnosis was documented in both MDM as applicable and the Disposition within this note       Time User Action Codes Description Comment    3/26/2024 11:26 AM Evin Ha Add [A08.4] Viral gastroenteritis           ED Disposition       ED Disposition   Discharge    Condition   Stable    Date/Time   Tue Mar 26, 2024 1126    Comment    Sara Harrell discharge to home/self care.                   Follow-up Information       Follow up With Specialties Details Why Contact Info Additional Information    [facility] Emergency Medicine Go to  If symptoms worsen [address]  [facility] [address] [phone] [facility], [address] [place], [place], [place] [address]    Yomi Hill MD Family Medicine, Emergency Medicine Schedule an appointment as soon as possible for a visit   [address] [place] [address]  Ronald SAMPSON [address] [phone]               Discharge Medication List as of 3/26/2024 12:11 PM        CONTINUE these medications which have NOT CHANGED    Details   diphenoxylate-atropine (LOMOTIL) 2.5-0.025 mg/5 mL liquid Take 5 mL by mouth 4 (four) times a day as needed for diarrhea for up to 10 days, Starting Sat 3/23/2024, Until Tue 4/2/2024 at 2359, Normal      albuterol (Ventolin HFA) 90 mcg/act inhaler Inhale 2 puffs every 6 (six) hours as needed for wheezing, Starting Fri 10/20/2023, Normal      cetirizine (ZyrTEC) 10 mg tablet Take 10 mg by mouth, Historical Med      clotrimazole-betamethasone (LOTRISONE) 1-0.05 % cream Apply topically 2 (two) times a day, Starting Thu 8/1/2019, Historical Med      CRANBERRY PO Take by mouth 2 (two) times a day, Historical Med      fluticasone (FLONASE) 50 mcg/act nasal spray 1 spray into each nostril daily, Starting Fri 10/20/2023, Normal      Probiotic Product (PROBIOTIC-10 PO) Take by mouth, Historical Med      semaglutide, 1 mg/dose, (Ozempic) 4 mg/3 mL injection pen Inject 0.75 mL (1 mg total) under the skin once a week, Starting Wed 1/31/2024, Normal             No discharge procedures on file.    PDMP Review       None            ED Provider  Electronically Signed by             Evin Ha PA-C  03/26/24 8382

## 2024-03-26 NOTE — ED ATTENDING ATTESTATION
"3/26/2024  I, Say Grey Jr, DO, saw and evaluated the patient. I have discussed the patient with the resident/non-physician practitioner and agree with the resident's/non-physician practitioner's findings, Plan of Care, and MDM as documented in the resident's/non-physician practitioner's note, except where noted. All available labs and Radiology studies were reviewed.  I was present for key portions of any procedure(s) performed by the resident/non-physician practitioner and I was immediately available to provide assistance.       At this point I agree with the current assessment done in the Emergency Department.  I have conducted an independent evaluation of this patient a history and physical is as follows:    Patient is a 58-year-old female with history of recently diagnosed gastroenteritis who is in the emergency department today after a night of frequent nonbloody bowel movements.  The patient notes she has generalized abdominal discomfort and 1 episode of vomiting today.  Patient thought she was doing better and has been taking medicine as prescribed however symptoms seem to worsen overnight, prompting her to come back to the ER.  The patient states that she feels she is dehydrated because she \"cannot keep any liquid in me.\"  On exam.  The patient is not tachycardic has mild generalized abdominal pain, and lung exam is clear.  Patient be given IV fluids and labs will be reevaluated.  CT scan that was done a few days ago were negative for acute pathology.    Patient felt better after IV fluids.  Stool cultures were sent.    ED Course  ED Course as of 03/26/24 1539   Tue Mar 26, 2024   0951 WBC: 9.20  Improved         Critical Care Time  Procedures      "

## 2024-03-26 NOTE — Clinical Note
Sara Harrell was seen and treated in our emergency department on 3/26/2024.    No restrictions            Diagnosis: Gastroenteritis    Sara  may return to work on return date.    She may return on this date: 03/29/2024         If you have any questions or concerns, please don't hesitate to call.      Evin Ha PA-C    ______________________________           _______________          _______________  Hospital Representative                              Date                                Time

## 2024-03-27 ENCOUNTER — VBI (OUTPATIENT)
Dept: FAMILY MEDICINE CLINIC | Facility: CLINIC | Age: 59
End: 2024-03-27

## 2024-03-27 DIAGNOSIS — K57.90 DIVERTICULOSIS: Primary | ICD-10-CM

## 2024-03-27 LAB
C COLI+JEJUNI TUF STL QL NAA+PROBE: NEGATIVE
C DIFF TOX GENS STL QL NAA+PROBE: NEGATIVE
EC STX1+STX2 GENES STL QL NAA+PROBE: NEGATIVE
SALMONELLA SP SPAO STL QL NAA+PROBE: NEGATIVE
SHIGELLA SP+EIEC IPAH STL QL NAA+PROBE: NEGATIVE

## 2024-03-27 RX ORDER — DICYCLOMINE HYDROCHLORIDE 10 MG/1
10 CAPSULE ORAL
Qty: 30 CAPSULE | Refills: 0 | Status: SHIPPED | OUTPATIENT
Start: 2024-03-27

## 2024-03-27 NOTE — TELEPHONE ENCOUNTER
03/27/24 3:48 PM    Patient contacted post ED visit, [facility] department spoke with patient/caregiver and outreach was successful.    Thank you.  Cruzito Carson MA  [facility]

## 2024-03-30 ENCOUNTER — NURSE TRIAGE (OUTPATIENT)
Dept: OTHER | Facility: OTHER | Age: 59
End: 2024-03-30

## 2024-03-30 NOTE — TELEPHONE ENCOUNTER
"Regarding: norovirus  ----- Message from Deena Bruno sent at 3/30/2024  9:51 AM EDT -----  \" I have been sick for 10 days and was told by my PCP that if I am not feeling better I should call him. \"    "

## 2024-04-03 ENCOUNTER — OFFICE VISIT (OUTPATIENT)
Dept: GASTROENTEROLOGY | Facility: CLINIC | Age: 59
End: 2024-04-03
Payer: COMMERCIAL

## 2024-04-03 ENCOUNTER — TELEPHONE (OUTPATIENT)
Dept: FAMILY MEDICINE CLINIC | Facility: CLINIC | Age: 59
End: 2024-04-03

## 2024-04-03 VITALS
HEIGHT: 67 IN | DIASTOLIC BLOOD PRESSURE: 88 MMHG | WEIGHT: 227 LBS | BODY MASS INDEX: 35.63 KG/M2 | TEMPERATURE: 98.4 F | HEART RATE: 81 BPM | SYSTOLIC BLOOD PRESSURE: 138 MMHG | OXYGEN SATURATION: 97 %

## 2024-04-03 DIAGNOSIS — R19.7 DIARRHEA, UNSPECIFIED TYPE: ICD-10-CM

## 2024-04-03 DIAGNOSIS — K29.00 ACUTE GASTRITIS WITHOUT HEMORRHAGE, UNSPECIFIED GASTRITIS TYPE: ICD-10-CM

## 2024-04-03 DIAGNOSIS — A08.4 VIRAL GASTROENTERITIS: ICD-10-CM

## 2024-04-03 DIAGNOSIS — Z12.11 SCREENING FOR COLON CANCER: ICD-10-CM

## 2024-04-03 DIAGNOSIS — K29.00 ACUTE GASTRITIS WITHOUT HEMORRHAGE, UNSPECIFIED GASTRITIS TYPE: Primary | ICD-10-CM

## 2024-04-03 DIAGNOSIS — Z80.0 FAMILY HISTORY OF COLON CANCER IN MOTHER: ICD-10-CM

## 2024-04-03 DIAGNOSIS — R10.13 EPIGASTRIC PAIN: ICD-10-CM

## 2024-04-03 DIAGNOSIS — K21.9 GASTROESOPHAGEAL REFLUX DISEASE, UNSPECIFIED WHETHER ESOPHAGITIS PRESENT: ICD-10-CM

## 2024-04-03 DIAGNOSIS — R11.0 NAUSEA: Primary | ICD-10-CM

## 2024-04-03 PROCEDURE — 99214 OFFICE O/P EST MOD 30 MIN: CPT | Performed by: NURSE PRACTITIONER

## 2024-04-03 RX ORDER — FAMOTIDINE 20 MG/1
TABLET, FILM COATED ORAL
Qty: 60 TABLET | Refills: 2 | Status: SHIPPED | OUTPATIENT
Start: 2024-04-03

## 2024-04-03 RX ORDER — OMEPRAZOLE 40 MG/1
40 CAPSULE, DELAYED RELEASE ORAL DAILY
Qty: 30 CAPSULE | Refills: 2 | Status: SHIPPED | OUTPATIENT
Start: 2024-04-03

## 2024-04-03 NOTE — H&P (VIEW-ONLY)
[facility] - Outpatient Follow-up Note  Sara Harrell 59 y.o. female MRN: [id number]  Encounter: [id number]          ASSESSMENT AND PLAN:    The patient presents today regarding her worsening nausea, epigastric pain, GERD symptoms, and diarrhea, most likely to acute gastritis without any hemorrhage.     Exam: Abdomen is soft with distended, with mild to moderate tenderness noted upon exam in the epigastric and right upper quadrant areas with mild guarding, without any significant rebound tenderness on upon exam, with hypoactive bowel sounds x 4. No edema noted of the b/l lower extremities upon exam today. Skin is non-icteric.      1. Nausea  2. Epigastric pain  3. Acute gastritis without hemorrhage, unspecified gastritis type  4. Gastroesophageal reflux disease, unspecified whether esophagitis present  I discussed with the patient that with their current symptoms and exam findings, I feel it would be beneficial to proceed with an EGD to further evaluate any underlying etiology that could explain their symptoms, with differential diagnoses that could include but are not limited to; GERD, gastric ulcers, Ulloa's esophagus, EOE, H. pylori, etc. They were agreeable and verbalized and understanding.     I discussed the risks of procedure with the patient including, but not limited to: bleeding, infection, sore throat, and perforation. The patient gave verbal understanding and is agreeable to proceed.     In the meantime I discussed with the patient that since she is using the omeprazole as needed, I would actually like her to start taking it every day and actually increase the dosage to 40 mg daily in the morning prior to a meal and start her on famotidine 20 mg twice daily as needed for breakthrough symptoms, however, I discussed with her that for now would like her to take 20 mg at nighttime and in between the famotidine and omeprazole dose for now and then if in the next  few days to week or 2 she feels things are starting to improve she can just continue with the nighttime dose and use the middle dose as needed for breakthrough reflux.  I explained to her that I would like her to do this until after the EGD so we can review and discuss her treatment plan options.  The patient is to contact our office if she has any side effects or issues with the changes in her medication regimen.  The patient was agreeable and verbalized an understanding.    Encouraged the patient to avoid trigger foods as much as possible.  Can continue Zofran as needed for nausea.    - Ambulatory Referral to Gastroenterology  - omeprazole (PriLOSEC) 40 MG capsule; Take 1 capsule (40 mg total) by mouth daily  Dispense: 30 capsule; Refill: 2  - famotidine (PEPCID) 20 mg tablet; Take 1 PO BID.  Dispense: 60 tablet; Refill: 2  - EGD; Future    5. Diarrhea, unspecified type   discussed with the patient that at this point time with all of their symptoms, I am suspicious of SIBO as a possible underlying cause of their chronic symptoms at this point feel that it would be appropriate to proceed with a SIBO breath test to rule it out as a cause.  I advised the patient that we would provide them with the breath test kit today prior to leaving as well as review the instructions with them on how to collect the sample. Once they have completed the breath test, they are to return it back to our office so we can send it for processing.  I advised the patient that once we have the results of the SIBO breath test our office will contact him to review the results and discuss any other treatment recommendations, which may include a 2-week treatment with an antibiotic called Xifaxan.  The patient was agreeable and verbalized an understanding.     Can continue Lomotil 4 times daily as needed for loose stools/diarrhea.  Encouraged the patient continue to try to stay hydrated and drink as close to 64 ounces of water/fluids a day as  possible.    6. Screening for colon cancer  7. Family history of colon cancer in mother  We will hold off on any repeat colonoscopies until the recommended surveillance date unless the patient would start to develop red flag symptoms in the future.  The patient was agreeable and verbalized an understanding.  DUE: 7/21/26    Reviewed GI red flag symptoms, including, but not limited to: chronic nausea, vomiting, diarrhea, chills, fever, and unintentional weight loss and should call or contact our office with any changes or concerns. I reviewed with the patient that if they notice any blood while vomiting or in their stool they should contact or office or go to the nearest emergency room for immediate evaluation. The patient was agreeable and verbalized an understanding.     The patient will schedule a follow up office visit after EGD, but understands to call or contact our office if there are any issues or concerns in the mean time.  ______________________________________________________________________    SUBJECTIVE: Patient is a 59 y.o. female who was previously seen in our office on 3/22/22 by RIGOBERTO Arenas PA-C and presents today for follow-up office visit regarding her worsening nausea, epigastric pain, GERD symptoms, and diarrhea, most likely to acute gastritis without any hemorrhage.  The patient reports that for the past 2 years she has noticed that in the fall and in the spring she has periods of time where she has similar symptoms with worsening nausea, epigastric pain, GERD symptoms, gastritis, and diarrhea, but that typically with over-the-counter omeprazole as needed, going to a bland or liquid diet for a few days, it does eventually get better and then she is fine for a while.  However, approximately 2 weeks ago she started with another bout and this has been one of the worst she has had in 2 years.  The patient reports that she has horrible acid tasting and foul-smelling belching with significant epigastric  pain, abdominal pain, bloating, and burning sensations.  The patient reports that when she drinks water it makes it significantly worse so she is drinking watered-down Gatorade to keep hydrated and keep her electrolytes stable.  She is currently only able to tolerate bland foods such as mashed potatoes, soups, oatmeal, and even that seems to be bothering her especially over the past day or 2.     She is waking up frequently in the middle the night to have bowel movements, specifically loose diarrhea-like stools without any blood or melena.  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    The patient did go to the ER on March 23, 2024 and had a CT scan of the abdomen pelvis with contrast, which was normal.  The patient also had stool studies which were negative as well.    The patient denies any dysphagia, vomiting, decreased appetite, unplanned weight loss, or abdominal pain.     The patient has been on Ozempic for over a year, but has not noticed any correlation or coincidence with starting Ozempic or any of the increases she did just increase the Ozempic dose in January and has lost weight being on the Ozempic.    The patient reports that they have a BM 1-6 x daily, but is loose and reports that it is not relieving, without any constipation, straining, melena or bloody stools. Last BM: Today. Flatus: Yes.    Meds: Omeprazole 20 mg as needed, dicyclomine 10 mg 4 times daily as needed, Lomotil 4 times daily as needed and probiotics.  Daily NSAID Use: Denied    Tobacco: Denied  ETOH: Occasionally  Marijuana: Denied  Illicit Drug Use: Denied    Imaging: (3/23/24): CT of the abdomen and pelvis with contrast: Normal.    Endoscopy History: EGD: (None):     COLONOSCOPY: (7/21/21): Scattered diverticulosis  throughout the colon, otherwise normal with a recommendation of a repeat colonoscopy in 5 years secondary to a family history of colon cancer.    DUE: 27    REVIEW OF SYSTEMS IS OTHERWISE NEGATIVE.      Historical Information   Past Medical History:   Diagnosis Date    Allergic     Asthma due to seasonal allergies     GERD (gastroesophageal reflux disease)     Kidney stone     Pneumonia     Urinary tract infection      Past Surgical History:   Procedure Laterality Date    APPENDECTOMY  10/1982     SECTION      two-  &     HYSTERECTOMY      partial    JOINT REPLACEMENT      REPLACEMENT TOTAL KNEE BILATERAL      right- , left-      Social History   Social History     Substance and Sexual Activity   Alcohol Use Yes    Alcohol/week: 4.0 standard drinks of alcohol    Types: 2 Glasses of wine, 2 Shots of liquor per week    Comment: occasional     Social History     Substance and Sexual Activity   Drug Use No     Social History     Tobacco Use   Smoking Status Never   Smokeless Tobacco Never     Family History   Problem Relation Age of Onset    Heart disease Father     Hypertension Father     Urolithiasis Mother     Colon cancer Mother     Arthritis Mother     Cancer Mother         Colon cancer    Hearing loss Mother     Kidney cancer Brother     Urolithiasis Brother     Hepatitis Brother         hep c    Kidney disease Brother     Urolithiasis Sister     Diabetes Sister     Kidney disease Sister     Urolithiasis Daughter        Meds/Allergies       Current Outpatient Medications:     albuterol (Ventolin HFA) 90 mcg/act inhaler    cetirizine (ZyrTEC) 10 mg tablet    clotrimazole-betamethasone (LOTRISONE) 1-0.05 % cream    CRANBERRY PO    dicyclomine (BENTYL) 10 mg capsule    fluticasone (FLONASE) 50 mcg/act nasal spray    Probiotic Product (PROBIOTIC-10 PO)    semaglutide, 1 mg/dose, (Ozempic) 4 mg/3 mL injection pen    Allergies   Allergen Reactions    Prednisone Other (See Comments)      "Headaches and blood pressure spikes      Sulfa Antibiotics Hives    Vicodin [Hydrocodone-Acetaminophen] Anxiety    Codeine Hives    Colistin      Other reaction(s): Other (See Comments)  colymycin otic    Other Itching     Other reaction(s): Other (See Comments)  burn           Objective     Blood pressure 138/88, pulse 81, temperature 98.4 °F (36.9 °C), temperature source Temporal, height 5' 7\" (1.702 m), weight 103 kg (227 lb), SpO2 97%. Body mass index is 35.55 kg/m².      PHYSICAL EXAM:      General Appearance:   Alert, cooperative, no distress   HEENT:   Normocephalic, atraumatic, anicteric.     Neck:  Supple, symmetrical, trachea midline   Lungs:   Clear to auscultation bilaterally; no rales, rhonchi or wheezing; respirations unlabored    Heart::   Regular rate and rhythm; no murmur, rub, or gallop.   Abdomen:   Soft, non-tender, non-distended; normal bowel sounds; no masses, no organomegaly    Genitalia:   Deferred    Rectal:   Deferred    Extremities:  No cyanosis, clubbing or edema    Pulses:  2+ and symmetric    Skin:  No jaundice, rashes, or lesions    Lymph nodes:  No palpable cervical lymphadenopathy        Lab Results:   No visits with results within 1 Day(s) from this visit.   Latest known visit with results is:   Admission on 03/26/2024, Discharged on 03/26/2024   Component Date Value    WBC 03/26/2024 9.20     RBC 03/26/2024 5.52 (H)     Hemoglobin 03/26/2024 15.2     Hematocrit 03/26/2024 47.0 (H)     MCV 03/26/2024 85     MCH 03/26/2024 27.5     MCHC 03/26/2024 32.3     RDW 03/26/2024 14.1     MPV 03/26/2024 9.4     Platelets 03/26/2024 283     nRBC 03/26/2024 0     Neutrophils Relative 03/26/2024 74     Immature Grans % 03/26/2024 0     Lymphocytes Relative 03/26/2024 16     Monocytes Relative 03/26/2024 7     Eosinophils Relative 03/26/2024 3     Basophils Relative 03/26/2024 0     Neutrophils Absolute 03/26/2024 6.74     Absolute Immature Grans 03/26/2024 0.03     Absolute Lymphocytes " 03/26/2024 1.50     Absolute Monocytes 03/26/2024 0.64     Eosinophils Absolute 03/26/2024 0.26     Basophils Absolute 03/26/2024 0.03     Sodium 03/26/2024 139     Potassium 03/26/2024 3.5     Chloride 03/26/2024 105     CO2 03/26/2024 24     ANION GAP 03/26/2024 10     BUN 03/26/2024 13     Creatinine 03/26/2024 0.76     Glucose 03/26/2024 93     Calcium 03/26/2024 9.3     AST 03/26/2024 9 (L)     ALT 03/26/2024 11     Alkaline Phosphatase 03/26/2024 73     Total Protein 03/26/2024 6.9     Albumin 03/26/2024 4.2     Total Bilirubin 03/26/2024 0.53     eGFR 03/26/2024 87     Lipase 03/26/2024 16     C.difficile toxin by PCR 03/26/2024 Negative     Salmonella sp PCR 03/26/2024 Negative     Shigella sp/Enteroinvasi* 03/26/2024 Negative     Campylobacter sp (jejuni* 03/26/2024 Negative     Shiga toxin 1/Shiga toxi* 03/26/2024 Negative          Radiology Results:   CT abdomen pelvis with contrast    Result Date: 3/23/2024  Narrative: CT ABDOMEN AND PELVIS WITH IV CONTRAST INDICATION: abd pain n/v/d. COMPARISON: None. TECHNIQUE: CT examination of the abdomen and pelvis was performed. Multiplanar 2D reformatted images were created from the source data. This examination, like all CT scans performed in the [facility], was performed utilizing techniques to minimize radiation dose exposure, including the use of iterative reconstruction and automated exposure control. Radiation dose length product (DLP) for this visit: 786.77 mGy-cm IV Contrast: 100 mL of iohexol (OMNIPAQUE) Enteric Contrast: Not administered. FINDINGS: ABDOMEN LOWER CHEST: No clinically significant abnormality in the visualized lower chest. LIVER/BILIARY TREE: Unremarkable. GALLBLADDER: No calcified gallstones. No pericholecystic inflammatory change. SPLEEN: Unremarkable. PANCREAS: Unremarkable. ADRENAL GLANDS: Unremarkable. KIDNEYS/URETERS: Bilateral parapelvic cysts. No hydronephrosis. STOMACH AND BOWEL: Colonic diverticulosis without  findings of acute diverticulitis. APPENDIX: No findings to suggest appendicitis. ABDOMINOPELVIC CAVITY: No ascites. No pneumoperitoneum. No lymphadenopathy. VESSELS: Unremarkable for patient's age. PELVIS REPRODUCTIVE ORGANS: Post hysterectomy. URINARY BLADDER: Unremarkable. ABDOMINAL WALL/INGUINAL REGIONS: Unremarkable. BONES: No acute fracture or suspicious osseous lesion.     Impression: No evidence of acute intra-abdominal or pelvic pathology Workstation performed: [id number]

## 2024-04-03 NOTE — PROGRESS NOTES
[facility] - Outpatient Follow-up Note  Sara Harrell 59 y.o. female MRN: [id number]  Encounter: [id number]          ASSESSMENT AND PLAN:    The patient presents today regarding her worsening nausea, epigastric pain, GERD symptoms, and diarrhea, most likely to acute gastritis without any hemorrhage.     Exam: Abdomen is soft with distended, with mild to moderate tenderness noted upon exam in the epigastric and right upper quadrant areas with mild guarding, without any significant rebound tenderness on upon exam, with hypoactive bowel sounds x 4. No edema noted of the b/l lower extremities upon exam today. Skin is non-icteric.      1. Nausea  2. Epigastric pain  3. Acute gastritis without hemorrhage, unspecified gastritis type  4. Gastroesophageal reflux disease, unspecified whether esophagitis present  I discussed with the patient that with their current symptoms and exam findings, I feel it would be beneficial to proceed with an EGD to further evaluate any underlying etiology that could explain their symptoms, with differential diagnoses that could include but are not limited to; GERD, gastric ulcers, Ulloa's esophagus, EOE, H. pylori, etc. They were agreeable and verbalized and understanding.     I discussed the risks of procedure with the patient including, but not limited to: bleeding, infection, sore throat, and perforation. The patient gave verbal understanding and is agreeable to proceed.     In the meantime I discussed with the patient that since she is using the omeprazole as needed, I would actually like her to start taking it every day and actually increase the dosage to 40 mg daily in the morning prior to a meal and start her on famotidine 20 mg twice daily as needed for breakthrough symptoms, however, I discussed with her that for now would like her to take 20 mg at nighttime and in between the famotidine and omeprazole dose for now and then if in the next  few days to week or 2 she feels things are starting to improve she can just continue with the nighttime dose and use the middle dose as needed for breakthrough reflux.  I explained to her that I would like her to do this until after the EGD so we can review and discuss her treatment plan options.  The patient is to contact our office if she has any side effects or issues with the changes in her medication regimen.  The patient was agreeable and verbalized an understanding.    Encouraged the patient to avoid trigger foods as much as possible.  Can continue Zofran as needed for nausea.    - Ambulatory Referral to Gastroenterology  - omeprazole (PriLOSEC) 40 MG capsule; Take 1 capsule (40 mg total) by mouth daily  Dispense: 30 capsule; Refill: 2  - famotidine (PEPCID) 20 mg tablet; Take 1 PO BID.  Dispense: 60 tablet; Refill: 2  - EGD; Future    5. Diarrhea, unspecified type   discussed with the patient that at this point time with all of their symptoms, I am suspicious of SIBO as a possible underlying cause of their chronic symptoms at this point feel that it would be appropriate to proceed with a SIBO breath test to rule it out as a cause.  I advised the patient that we would provide them with the breath test kit today prior to leaving as well as review the instructions with them on how to collect the sample. Once they have completed the breath test, they are to return it back to our office so we can send it for processing.  I advised the patient that once we have the results of the SIBO breath test our office will contact him to review the results and discuss any other treatment recommendations, which may include a 2-week treatment with an antibiotic called Xifaxan.  The patient was agreeable and verbalized an understanding.     Can continue Lomotil 4 times daily as needed for loose stools/diarrhea.  Encouraged the patient continue to try to stay hydrated and drink as close to 64 ounces of water/fluids a day as  possible.    6. Screening for colon cancer  7. Family history of colon cancer in mother  We will hold off on any repeat colonoscopies until the recommended surveillance date unless the patient would start to develop red flag symptoms in the future.  The patient was agreeable and verbalized an understanding.  DUE: 7/21/26    Reviewed GI red flag symptoms, including, but not limited to: chronic nausea, vomiting, diarrhea, chills, fever, and unintentional weight loss and should call or contact our office with any changes or concerns. I reviewed with the patient that if they notice any blood while vomiting or in their stool they should contact or office or go to the nearest emergency room for immediate evaluation. The patient was agreeable and verbalized an understanding.     The patient will schedule a follow up office visit after EGD, but understands to call or contact our office if there are any issues or concerns in the mean time.  ______________________________________________________________________    SUBJECTIVE: Patient is a 59 y.o. female who was previously seen in our office on 3/22/22 by RIGOBERTO Arenas PA-C and presents today for follow-up office visit regarding her worsening nausea, epigastric pain, GERD symptoms, and diarrhea, most likely to acute gastritis without any hemorrhage.  The patient reports that for the past 2 years she has noticed that in the fall and in the spring she has periods of time where she has similar symptoms with worsening nausea, epigastric pain, GERD symptoms, gastritis, and diarrhea, but that typically with over-the-counter omeprazole as needed, going to a bland or liquid diet for a few days, it does eventually get better and then she is fine for a while.  However, approximately 2 weeks ago she started with another bout and this has been one of the worst she has had in 2 years.  The patient reports that she has horrible acid tasting and foul-smelling belching with significant epigastric  pain, abdominal pain, bloating, and burning sensations.  The patient reports that when she drinks water it makes it significantly worse so she is drinking watered-down Gatorade to keep hydrated and keep her electrolytes stable.  She is currently only able to tolerate bland foods such as mashed potatoes, soups, oatmeal, and even that seems to be bothering her especially over the past day or 2.     She is waking up frequently in the middle the night to have bowel movements, specifically loose diarrhea-like stools without any blood or melena.  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    The patient did go to the ER on March 23, 2024 and had a CT scan of the abdomen pelvis with contrast, which was normal.  The patient also had stool studies which were negative as well.    The patient denies any dysphagia, vomiting, decreased appetite, unplanned weight loss, or abdominal pain.     The patient has been on Ozempic for over a year, but has not noticed any correlation or coincidence with starting Ozempic or any of the increases she did just increase the Ozempic dose in January and has lost weight being on the Ozempic.    The patient reports that they have a BM 1-6 x daily, but is loose and reports that it is not relieving, without any constipation, straining, melena or bloody stools. Last BM: Today. Flatus: Yes.    Meds: Omeprazole 20 mg as needed, dicyclomine 10 mg 4 times daily as needed, Lomotil 4 times daily as needed and probiotics.  Daily NSAID Use: Denied    Tobacco: Denied  ETOH: Occasionally  Marijuana: Denied  Illicit Drug Use: Denied    Imaging: (3/23/24): CT of the abdomen and pelvis with contrast: Normal.    Endoscopy History: EGD: (None):     COLONOSCOPY: (7/21/21): Scattered diverticulosis  throughout the colon, otherwise normal with a recommendation of a repeat colonoscopy in 5 years secondary to a family history of colon cancer.    DUE: 27    REVIEW OF SYSTEMS IS OTHERWISE NEGATIVE.      Historical Information   Past Medical History:   Diagnosis Date    Allergic     Asthma due to seasonal allergies     GERD (gastroesophageal reflux disease)     Kidney stone     Pneumonia     Urinary tract infection      Past Surgical History:   Procedure Laterality Date    APPENDECTOMY  10/1982     SECTION      two-  &     HYSTERECTOMY      partial    JOINT REPLACEMENT      REPLACEMENT TOTAL KNEE BILATERAL      right- , left-      Social History   Social History     Substance and Sexual Activity   Alcohol Use Yes    Alcohol/week: 4.0 standard drinks of alcohol    Types: 2 Glasses of wine, 2 Shots of liquor per week    Comment: occasional     Social History     Substance and Sexual Activity   Drug Use No     Social History     Tobacco Use   Smoking Status Never   Smokeless Tobacco Never     Family History   Problem Relation Age of Onset    Heart disease Father     Hypertension Father     Urolithiasis Mother     Colon cancer Mother     Arthritis Mother     Cancer Mother         Colon cancer    Hearing loss Mother     Kidney cancer Brother     Urolithiasis Brother     Hepatitis Brother         hep c    Kidney disease Brother     Urolithiasis Sister     Diabetes Sister     Kidney disease Sister     Urolithiasis Daughter        Meds/Allergies       Current Outpatient Medications:     albuterol (Ventolin HFA) 90 mcg/act inhaler    cetirizine (ZyrTEC) 10 mg tablet    clotrimazole-betamethasone (LOTRISONE) 1-0.05 % cream    CRANBERRY PO    dicyclomine (BENTYL) 10 mg capsule    fluticasone (FLONASE) 50 mcg/act nasal spray    Probiotic Product (PROBIOTIC-10 PO)    semaglutide, 1 mg/dose, (Ozempic) 4 mg/3 mL injection pen    Allergies   Allergen Reactions    Prednisone Other (See Comments)      "Headaches and blood pressure spikes      Sulfa Antibiotics Hives    Vicodin [Hydrocodone-Acetaminophen] Anxiety    Codeine Hives    Colistin      Other reaction(s): Other (See Comments)  colymycin otic    Other Itching     Other reaction(s): Other (See Comments)  burn           Objective     Blood pressure 138/88, pulse 81, temperature 98.4 °F (36.9 °C), temperature source Temporal, height 5' 7\" (1.702 m), weight 103 kg (227 lb), SpO2 97%. Body mass index is 35.55 kg/m².      PHYSICAL EXAM:      General Appearance:   Alert, cooperative, no distress   HEENT:   Normocephalic, atraumatic, anicteric.     Neck:  Supple, symmetrical, trachea midline   Lungs:   Clear to auscultation bilaterally; no rales, rhonchi or wheezing; respirations unlabored    Heart::   Regular rate and rhythm; no murmur, rub, or gallop.   Abdomen:   Soft, non-tender, non-distended; normal bowel sounds; no masses, no organomegaly    Genitalia:   Deferred    Rectal:   Deferred    Extremities:  No cyanosis, clubbing or edema    Pulses:  2+ and symmetric    Skin:  No jaundice, rashes, or lesions    Lymph nodes:  No palpable cervical lymphadenopathy        Lab Results:   No visits with results within 1 Day(s) from this visit.   Latest known visit with results is:   Admission on 03/26/2024, Discharged on 03/26/2024   Component Date Value    WBC 03/26/2024 9.20     RBC 03/26/2024 5.52 (H)     Hemoglobin 03/26/2024 15.2     Hematocrit 03/26/2024 47.0 (H)     MCV 03/26/2024 85     MCH 03/26/2024 27.5     MCHC 03/26/2024 32.3     RDW 03/26/2024 14.1     MPV 03/26/2024 9.4     Platelets 03/26/2024 283     nRBC 03/26/2024 0     Neutrophils Relative 03/26/2024 74     Immature Grans % 03/26/2024 0     Lymphocytes Relative 03/26/2024 16     Monocytes Relative 03/26/2024 7     Eosinophils Relative 03/26/2024 3     Basophils Relative 03/26/2024 0     Neutrophils Absolute 03/26/2024 6.74     Absolute Immature Grans 03/26/2024 0.03     Absolute Lymphocytes " 03/26/2024 1.50     Absolute Monocytes 03/26/2024 0.64     Eosinophils Absolute 03/26/2024 0.26     Basophils Absolute 03/26/2024 0.03     Sodium 03/26/2024 139     Potassium 03/26/2024 3.5     Chloride 03/26/2024 105     CO2 03/26/2024 24     ANION GAP 03/26/2024 10     BUN 03/26/2024 13     Creatinine 03/26/2024 0.76     Glucose 03/26/2024 93     Calcium 03/26/2024 9.3     AST 03/26/2024 9 (L)     ALT 03/26/2024 11     Alkaline Phosphatase 03/26/2024 73     Total Protein 03/26/2024 6.9     Albumin 03/26/2024 4.2     Total Bilirubin 03/26/2024 0.53     eGFR 03/26/2024 87     Lipase 03/26/2024 16     C.difficile toxin by PCR 03/26/2024 Negative     Salmonella sp PCR 03/26/2024 Negative     Shigella sp/Enteroinvasi* 03/26/2024 Negative     Campylobacter sp (jejuni* 03/26/2024 Negative     Shiga toxin 1/Shiga toxi* 03/26/2024 Negative          Radiology Results:   CT abdomen pelvis with contrast    Result Date: 3/23/2024  Narrative: CT ABDOMEN AND PELVIS WITH IV CONTRAST INDICATION: abd pain n/v/d. COMPARISON: None. TECHNIQUE: CT examination of the abdomen and pelvis was performed. Multiplanar 2D reformatted images were created from the source data. This examination, like all CT scans performed in the [facility], was performed utilizing techniques to minimize radiation dose exposure, including the use of iterative reconstruction and automated exposure control. Radiation dose length product (DLP) for this visit: 786.77 mGy-cm IV Contrast: 100 mL of iohexol (OMNIPAQUE) Enteric Contrast: Not administered. FINDINGS: ABDOMEN LOWER CHEST: No clinically significant abnormality in the visualized lower chest. LIVER/BILIARY TREE: Unremarkable. GALLBLADDER: No calcified gallstones. No pericholecystic inflammatory change. SPLEEN: Unremarkable. PANCREAS: Unremarkable. ADRENAL GLANDS: Unremarkable. KIDNEYS/URETERS: Bilateral parapelvic cysts. No hydronephrosis. STOMACH AND BOWEL: Colonic diverticulosis without  findings of acute diverticulitis. APPENDIX: No findings to suggest appendicitis. ABDOMINOPELVIC CAVITY: No ascites. No pneumoperitoneum. No lymphadenopathy. VESSELS: Unremarkable for patient's age. PELVIS REPRODUCTIVE ORGANS: Post hysterectomy. URINARY BLADDER: Unremarkable. ABDOMINAL WALL/INGUINAL REGIONS: Unremarkable. BONES: No acute fracture or suspicious osseous lesion.     Impression: No evidence of acute intra-abdominal or pelvic pathology Workstation performed: [id number]

## 2024-04-03 NOTE — TELEPHONE ENCOUNTER
Pt called and is still sick, loose stools yet, stomach feels like acid, very gassy, can you refer her to GI, please refer to  GI here in office, she does not want to go to [place].  [phone]

## 2024-04-03 NOTE — PATIENT INSTRUCTIONS
Start Omeprazole 40 mg daily before a meal.   Start famotidine 20 mg at bed time and then can use the second dose in between if needed.   Can use Zofran and Lomotil as needed.   Proceed with SIBO breath test.   Proceed with EGD.   Schedule a f/u OV after the EGD.

## 2024-04-04 PROBLEM — R10.13 EPIGASTRIC PAIN: Status: ACTIVE | Noted: 2024-04-04

## 2024-04-04 PROBLEM — Z12.11 SCREENING FOR COLON CANCER: Status: ACTIVE | Noted: 2024-04-04

## 2024-04-04 PROBLEM — Z80.0 FAMILY HISTORY OF COLON CANCER: Status: ACTIVE | Noted: 2024-04-04

## 2024-04-04 PROBLEM — R19.7 DIARRHEA: Status: ACTIVE | Noted: 2024-04-04

## 2024-04-04 PROBLEM — R11.0 NAUSEA: Status: ACTIVE | Noted: 2024-04-04

## 2024-04-04 PROBLEM — K29.00 ACUTE GASTRITIS WITHOUT HEMORRHAGE: Status: ACTIVE | Noted: 2024-04-04

## 2024-04-04 RX ORDER — DIPHENOXYLATE HCL/ATROPINE 2.5-.025/5
5 LIQUID (ML) ORAL 4 TIMES DAILY PRN
Qty: 60 ML | Refills: 0 | Status: SHIPPED | OUTPATIENT
Start: 2024-04-04 | End: 2024-04-14

## 2024-04-04 RX ORDER — ONDANSETRON 4 MG/1
4 TABLET, FILM COATED ORAL EVERY 8 HOURS PRN
Qty: 20 TABLET | Refills: 1 | Status: SHIPPED | OUTPATIENT
Start: 2024-04-04

## 2024-04-08 ENCOUNTER — TELEPHONE (OUTPATIENT)
Age: 59
End: 2024-04-08

## 2024-04-08 NOTE — TELEPHONE ENCOUNTER
Patients GI provider:  JAMES Del Valle    Number to return call: [phone]    Reason for call: Patient calling in regards to the sibo breath test. Patient states that per instructions, only to drink water the day before. Patient states that drinking plain water makes her stomach feel like a hole is being burning through. Reached out to clinical team whom stated patient is also allowed to have plain coffee or tea. Did call patient back and relayed information. Patient understood and had no further questions or concerns.     Scheduled procedure/appointment date if applicable: Apt/procedure

## 2024-04-15 ENCOUNTER — ANESTHESIA (OUTPATIENT)
Dept: ANESTHESIOLOGY | Facility: HOSPITAL | Age: 59
End: 2024-04-15

## 2024-04-15 ENCOUNTER — ANESTHESIA EVENT (OUTPATIENT)
Dept: ANESTHESIOLOGY | Facility: HOSPITAL | Age: 59
End: 2024-04-15

## 2024-04-15 NOTE — ANESTHESIA PREPROCEDURE EVALUATION
Procedure:  PRE-OP ONLY    GERD - EGD    Asthma - albuterol     Relevant Problems   ENDO   (+) Subclinical hypothyroidism      GI/HEPATIC   (+) Esophageal reflux             Anesthesia Plan  ASA Score- 2     Anesthesia Type- IV sedation with anesthesia with ASA Monitors.         Additional Monitors:     Airway Plan:            Plan Factors-    Chart reviewed.   Existing labs reviewed. Patient summary reviewed.                  Induction-     Postoperative Plan-     Informed Consent-

## 2024-04-17 ENCOUNTER — CLINICAL SUPPORT (OUTPATIENT)
Dept: GASTROENTEROLOGY | Facility: CLINIC | Age: 59
End: 2024-04-17
Payer: COMMERCIAL

## 2024-04-17 DIAGNOSIS — R11.0 NAUSEA: Primary | ICD-10-CM

## 2024-04-17 PROCEDURE — 91065 BREATH HYDROGEN/METHANE TEST: CPT | Performed by: INTERNAL MEDICINE

## 2024-04-18 NOTE — PROGRESS NOTES
[facility]       Bacterial Overgrowth Analytical Record    Sara Harrell 59 y.o. female MRN: [id number]      Date of Test: 4/11/24    Substrate Given: Lactulose    Ordering Provider: JAMES Delcid    Medical Assistant: Marleny Lopez    Symptoms: nausea    The patient presents for bacterial overgrowth testing.    Patient fasted overnight. Baseline readings obtained.   Breath test performed every 20 min for a total of 3 hr    Sample Clock Time ppmH2 ppmCH4 Co2% Celestine   Baseline 06:50am   46 7 2.4 2.29   #1  20 minutes 07:10am 93 11 3.5 1.57   #2  40 minutes 07:30am 90 9 3.1 1.77   #3  60 minutes 07:50am 46 7 3.0 1.83   #4  80 minutes 08:10am 48 9 3.1 1.77   #5  100 minutes 08:30am 48 7 3.1 1.77   #6  120 minutes 08:50am 43 7 3.2 1.71   #7  140 minutes 09:10am 63 8 2.7 2.03   #8  160 minutes 09:30am 55 7 3.1 1.77   #9  180 minutes 09:50am 36 6 3.5 1.57       Physician interpretation:     Results consistent with SIBO -high baseline hydrogen, and increase >20ppm early in the exam    Results forwarded to ordering provider

## 2024-04-19 ENCOUNTER — ANESTHESIA EVENT (OUTPATIENT)
Dept: PERIOP | Facility: HOSPITAL | Age: 59
End: 2024-04-19

## 2024-04-19 ENCOUNTER — HOSPITAL ENCOUNTER (OUTPATIENT)
Dept: PERIOP | Facility: HOSPITAL | Age: 59
Setting detail: OUTPATIENT SURGERY
Discharge: HOME | End: 2024-04-19
Payer: COMMERCIAL

## 2024-04-19 ENCOUNTER — ANESTHESIA (OUTPATIENT)
Dept: PERIOP | Facility: HOSPITAL | Age: 59
End: 2024-04-19

## 2024-04-19 VITALS
SYSTOLIC BLOOD PRESSURE: 138 MMHG | BODY MASS INDEX: 34.84 KG/M2 | OXYGEN SATURATION: 97 % | RESPIRATION RATE: 18 BRPM | DIASTOLIC BLOOD PRESSURE: 83 MMHG | HEART RATE: 68 BPM | HEIGHT: 67 IN | WEIGHT: 222 LBS | TEMPERATURE: 98 F

## 2024-04-19 DIAGNOSIS — K21.9 GASTROESOPHAGEAL REFLUX DISEASE, UNSPECIFIED WHETHER ESOPHAGITIS PRESENT: ICD-10-CM

## 2024-04-19 DIAGNOSIS — K29.00 ACUTE GASTRITIS WITHOUT HEMORRHAGE, UNSPECIFIED GASTRITIS TYPE: ICD-10-CM

## 2024-04-19 DIAGNOSIS — R10.13 EPIGASTRIC PAIN: ICD-10-CM

## 2024-04-19 DIAGNOSIS — R11.0 NAUSEA: ICD-10-CM

## 2024-04-19 PROCEDURE — 88305 TISSUE EXAM BY PATHOLOGIST: CPT | Performed by: PATHOLOGY

## 2024-04-19 RX ORDER — LIDOCAINE HYDROCHLORIDE 20 MG/ML
INJECTION, SOLUTION EPIDURAL; INFILTRATION; INTRACAUDAL; PERINEURAL AS NEEDED
Status: DISCONTINUED | OUTPATIENT
Start: 2024-04-19 | End: 2024-04-19

## 2024-04-19 RX ORDER — SODIUM CHLORIDE, SODIUM LACTATE, POTASSIUM CHLORIDE, CALCIUM CHLORIDE 600; 310; 30; 20 MG/100ML; MG/100ML; MG/100ML; MG/100ML
125 INJECTION, SOLUTION INTRAVENOUS CONTINUOUS
Status: DISCONTINUED | OUTPATIENT
Start: 2024-04-19 | End: 2024-04-23 | Stop reason: HOSPADM

## 2024-04-19 RX ORDER — ALBUTEROL SULFATE 2.5 MG/3ML
2.5 SOLUTION RESPIRATORY (INHALATION) ONCE AS NEEDED
Status: DISCONTINUED | OUTPATIENT
Start: 2024-04-19 | End: 2024-04-19 | Stop reason: HOSPADM

## 2024-04-19 RX ORDER — SODIUM CHLORIDE, SODIUM LACTATE, POTASSIUM CHLORIDE, CALCIUM CHLORIDE 600; 310; 30; 20 MG/100ML; MG/100ML; MG/100ML; MG/100ML
INJECTION, SOLUTION INTRAVENOUS CONTINUOUS PRN
Status: DISCONTINUED | OUTPATIENT
Start: 2024-04-19 | End: 2024-04-19

## 2024-04-19 RX ORDER — ONDANSETRON 2 MG/ML
4 INJECTION INTRAMUSCULAR; INTRAVENOUS ONCE AS NEEDED
Status: DISCONTINUED | OUTPATIENT
Start: 2024-04-19 | End: 2024-04-19 | Stop reason: HOSPADM

## 2024-04-19 RX ORDER — FENTANYL CITRATE/PF 50 MCG/ML
25 SYRINGE (ML) INJECTION
Status: DISCONTINUED | OUTPATIENT
Start: 2024-04-19 | End: 2024-04-19 | Stop reason: HOSPADM

## 2024-04-19 RX ORDER — PROPOFOL 10 MG/ML
INJECTION, EMULSION INTRAVENOUS AS NEEDED
Status: DISCONTINUED | OUTPATIENT
Start: 2024-04-19 | End: 2024-04-19

## 2024-04-19 RX ADMIN — PROPOFOL 50 MG: 10 INJECTION, EMULSION INTRAVENOUS at 07:38

## 2024-04-19 RX ADMIN — PROPOFOL 150 MG: 10 INJECTION, EMULSION INTRAVENOUS at 07:35

## 2024-04-19 RX ADMIN — SODIUM CHLORIDE, SODIUM LACTATE, POTASSIUM CHLORIDE, AND CALCIUM CHLORIDE: .6; .31; .03; .02 INJECTION, SOLUTION INTRAVENOUS at 07:29

## 2024-04-19 RX ADMIN — LIDOCAINE HYDROCHLORIDE 100 MG: 20 INJECTION, SOLUTION EPIDURAL; INFILTRATION; INTRACAUDAL at 07:35

## 2024-04-19 RX ADMIN — SODIUM CHLORIDE, SODIUM LACTATE, POTASSIUM CHLORIDE, AND CALCIUM CHLORIDE 125 ML/HR: .6; .31; .03; .02 INJECTION, SOLUTION INTRAVENOUS at 07:18

## 2024-04-19 NOTE — ANESTHESIA POSTPROCEDURE EVALUATION
Post-Op Assessment Note    CV Status:  Stable  Pain Score: 0    Pain management: adequate       Mental Status:  Alert and awake   Hydration Status:  Euvolemic   PONV Controlled:  Controlled   Airway Patency:  Patent     Post Op Vitals Reviewed: Yes    No anethesia notable event occurred.    Staff: CRNA               BP   134/76   Temp      Pulse  68   Resp   16   SpO2   98

## 2024-04-19 NOTE — INTERVAL H&P NOTE
H&P reviewed. After examining the patient I find no changes in the patients condition since the H&P had been written.    Vitals:    04/19/24 0657   BP: 141/75   Pulse: 68   Resp: 20   Temp: (!) 97.4 °F (36.3 °C)   SpO2: 98%

## 2024-04-19 NOTE — ANESTHESIA PREPROCEDURE EVALUATION
Procedure:  EGD  GERD - EGD     Bronchitis when sick - albuterol >1 year use    Denies the following: CP/SOB with exertion, asthma, COPD, BHUPINDER, stroke/TIA, seizure    Relevant Problems   ENDO   (+) Subclinical hypothyroidism      GI/HEPATIC   (+) Esophageal reflux             Anesthesia Plan  ASA Score- 2     Anesthesia Type- IV sedation with anesthesia with ASA Monitors.         Additional Monitors:     Airway Plan:            Plan Factors-    Chart reviewed.   Existing labs reviewed. Patient summary reviewed.                  Induction-     Postoperative Plan-     Informed Consent-

## 2024-04-22 ENCOUNTER — TELEPHONE (OUTPATIENT)
Age: 59
End: 2024-04-22

## 2024-04-22 ENCOUNTER — OFFICE VISIT (OUTPATIENT)
Age: 59
End: 2024-04-22
Payer: COMMERCIAL

## 2024-04-22 VITALS
DIASTOLIC BLOOD PRESSURE: 76 MMHG | WEIGHT: 226.2 LBS | TEMPERATURE: 97.3 F | OXYGEN SATURATION: 98 % | BODY MASS INDEX: 35.5 KG/M2 | SYSTOLIC BLOOD PRESSURE: 138 MMHG | HEIGHT: 67 IN | HEART RATE: 67 BPM

## 2024-04-22 DIAGNOSIS — Z12.11 SCREENING FOR COLON CANCER: ICD-10-CM

## 2024-04-22 DIAGNOSIS — K29.00 ACUTE GASTRITIS WITHOUT HEMORRHAGE, UNSPECIFIED GASTRITIS TYPE: ICD-10-CM

## 2024-04-22 DIAGNOSIS — R10.13 EPIGASTRIC PAIN: ICD-10-CM

## 2024-04-22 DIAGNOSIS — K63.8219 SMALL INTESTINAL BACTERIAL OVERGROWTH (SIBO): ICD-10-CM

## 2024-04-22 DIAGNOSIS — R11.0 NAUSEA: Primary | ICD-10-CM

## 2024-04-22 DIAGNOSIS — K21.9 GASTROESOPHAGEAL REFLUX DISEASE, UNSPECIFIED WHETHER ESOPHAGITIS PRESENT: ICD-10-CM

## 2024-04-22 PROCEDURE — 88305 TISSUE EXAM BY PATHOLOGIST: CPT | Performed by: PATHOLOGY

## 2024-04-22 PROCEDURE — 99214 OFFICE O/P EST MOD 30 MIN: CPT | Performed by: NURSE PRACTITIONER

## 2024-04-22 RX ORDER — FAMOTIDINE 20 MG/1
TABLET, FILM COATED ORAL
Qty: 60 TABLET | Refills: 2 | Status: SHIPPED | OUTPATIENT
Start: 2024-04-22

## 2024-04-22 RX ORDER — OMEPRAZOLE 40 MG/1
40 CAPSULE, DELAYED RELEASE ORAL 2 TIMES DAILY
Qty: 60 CAPSULE | Refills: 2 | Status: SHIPPED | OUTPATIENT
Start: 2024-04-22

## 2024-04-22 NOTE — PROGRESS NOTES
[facility] - Outpatient Follow-up Note  Sara Harrell 59 y.o. female MRN: [id number]  Encounter: [id number]          ASSESSMENT AND PLAN:    The patient presents today for follow-up office visit regarding her now intermittent nausea, epigastric pain, GERD symptoms, and acute gastritis with a recent positive SIBO breath test.    Exam: Abdomen soft, nondistended, with mild tenderness noted on exam in all 4 quadrants, but no significant guarding or rebound tenderness noted upon exam, with hypoactive bowel sounds x 4. No edema noted of the b/l lower extremities upon exam today. Skin is non-icteric.      1. Nausea  2. Epigastric pain  3. Gastroesophageal reflux disease, unspecified whether esophagitis present  4. Acute gastritis without hemorrhage, unspecified gastritis type  5. Small intestinal bacterial overgrowth (SIBO)  While the patient was in the office today, I did discuss with the patient that with her most recent positive SIBO testing for both hydrogen and methane bacterial overgrowth, I do feel would be appropriate to proceed with a 2-week course of Xifaxan 550 mg, 1 p.o. 3 times daily to treat the underlying SIBO and hopefully improve her symptoms overall, allowing us to titrate down and off medications in the future.  The patient is to complete the full course of antibiotics and understands that it can take another 4 to 6 weeks after she has completed the antibiotics to know how effective the Xifaxan was for her.  The patient was agreeable and verbalized an understanding.    In the meantime, I would like her to continue the omeprazole and famotidine as prescribed and hopefully at her next office visit we can talk about titrating her down and off of the PPI and eventually just use the famotidine as needed.  The patient was agreeable and verbalized an understanding.    Encouraged patient continue to avoid trigger foods much as possible.    - omeprazole (PriLOSEC) 40  MG capsule; Take 1 capsule (40 mg total) by mouth 2 (two) times a day  Dispense: 60 capsule; Refill: 2  - famotidine (PEPCID) 20 mg tablet; Take 1 PO BID.  Dispense: 60 tablet; Refill: 2  - rifaximin (XIFAXAN) 550 mg tablet; Take 1 tablet (550 mg total) by mouth 3 (three) times a day for 14 days  Dispense: 42 tablet; Refill: 0     6. Screening for colon cancer  We will hold off on any repeat colonoscopies until the recommended surveillance date unless the patient would start to develop red flag symptoms in the future.  The patient was agreeable and verbalized an understanding.  DUE: 7/21/26    Reviewed GI red flag symptoms, including, but not limited to: chronic nausea, vomiting, diarrhea, chills, fever, and unintentional weight loss and should call or contact our office with any changes or concerns. I reviewed with the patient that if they notice any blood while vomiting or in their stool they should contact or office or go to the nearest emergency room for immediate evaluation. The patient was agreeable and verbalized an understanding.     The patient will schedule a follow up office visit in 2 months, but understands to call or contact our office if there are any issues or concerns in the mean time.  ______________________________________________________________________    SUBJECTIVE: Patient is a 59 y.o. female who was previously seen in our office on 4/3/24 and presents today for follow-up office visit regarding her now intermittent nausea, epigastric pain, GERD symptoms, and acute gastritis with a recent positive SIBO breath test.  Since the patient's last office visit she did proceed with the EGD as recommended on April 19, 2024 with Dr. Nunn, however, there was a food bolus in the prepyloric region with the rest of the upper GI tract appearing normal with H. pylori and celiac disease biopsies being negative. However, in the meantime we did receive her SIBO breath test results which were positive for both  hydrogen and methane bacterial overgrowth.    The patient reports that although she does feel better, there are still good days and bad days, but is not needing the Zofran or dicyclomine at the present time but continues with the omeprazole and famotidine as prescribed.    Meds: Omeprazole 40 mg daily and famotidine 20 mg at bedtime with Zofran and dicyclomine as needed.    Imaging: (None):     Endoscopy History: EGD: (24): Relatively normal with a food bolus noted in the prepyloric region. Path: Normal.    COLONOSCOPY: (21): Scattered diverticulosis throughout the colon, otherwise normal with a recommendation of a repeat colonoscopy in 5 years secondary to a family history of colon cancer.     DUE: 26    REVIEW OF SYSTEMS IS OTHERWISE NEGATIVE.      Historical Information   Past Medical History:   Diagnosis Date    Allergic     Asthma due to seasonal allergies     GERD (gastroesophageal reflux disease)     Kidney stone     Pneumonia     Urinary tract infection      Past Surgical History:   Procedure Laterality Date    APPENDECTOMY  10/1982     SECTION      two-  &     HYSTERECTOMY      partial    JOINT REPLACEMENT      REPLACEMENT TOTAL KNEE BILATERAL      right- , left-      Social History   Social History     Substance and Sexual Activity   Alcohol Use Yes    Alcohol/week: 4.0 standard drinks of alcohol    Types: 2 Glasses of wine, 2 Shots of liquor per week    Comment: occasional     Social History     Substance and Sexual Activity   Drug Use No     Social History     Tobacco Use   Smoking Status Never   Smokeless Tobacco Never     Family History   Problem Relation Age of Onset    Heart disease Father     Hypertension Father     Urolithiasis Mother     Colon cancer Mother     Arthritis Mother     Cancer Mother         Colon cancer    Hearing loss Mother     Kidney cancer Brother     Urolithiasis Brother     Hepatitis Brother         hep c    Kidney disease Brother      Urolithiasis Sister     Diabetes Sister     Kidney disease Sister     Urolithiasis Daughter        Meds/Allergies       Current Outpatient Medications:     cetirizine (ZyrTEC) 10 mg tablet    clotrimazole-betamethasone (LOTRISONE) 1-0.05 % cream    CRANBERRY PO    dicyclomine (BENTYL) 10 mg capsule    famotidine (PEPCID) 20 mg tablet    omeprazole (PriLOSEC) 40 MG capsule    ondansetron (ZOFRAN) 4 mg tablet    semaglutide, 1 mg/dose, (Ozempic) 4 mg/3 mL injection pen  No current facility-administered medications for this visit.    Facility-Administered Medications Ordered in Other Visits:     lactated ringers infusion, 125 mL/hr, Intravenous, Continuous, 125 mL/hr at 04/19/24 0718    Allergies   Allergen Reactions    Prednisone Other (See Comments)     Headaches and blood pressure spikes      Sulfa Antibiotics Hives    Vicodin [Hydrocodone-Acetaminophen] Anxiety    Codeine Hives    Colistin      Other reaction(s): Other (See Comments)  colymycin otic    Other Itching     Other reaction(s): Other (See Comments)  burn           Objective     There were no vitals taken for this visit. There is no height or weight on file to calculate BMI.      PHYSICAL EXAM:      General Appearance:   Alert, cooperative, no distress   HEENT:   Normocephalic, atraumatic, anicteric.     Neck:  Supple, symmetrical, trachea midline   Lungs:   Clear to auscultation bilaterally; no rales, rhonchi or wheezing; respirations unlabored    Heart::   Regular rate and rhythm; no murmur, rub, or gallop.   Abdomen:   Soft, non-tender, non-distended; normal bowel sounds; no masses, no organomegaly    Genitalia:   Deferred    Rectal:   Deferred    Extremities:  No cyanosis, clubbing or edema    Pulses:  2+ and symmetric    Skin:  No jaundice, rashes, or lesions    Lymph nodes:  No palpable cervical lymphadenopathy        Lab Results:   No visits with results within 1 Day(s) from this visit.   Latest known visit with results is:   Admission on  03/26/2024, Discharged on 03/26/2024   Component Date Value    WBC 03/26/2024 9.20     RBC 03/26/2024 5.52 (H)     Hemoglobin 03/26/2024 15.2     Hematocrit 03/26/2024 47.0 (H)     MCV 03/26/2024 85     MCH 03/26/2024 27.5     MCHC 03/26/2024 32.3     RDW 03/26/2024 14.1     MPV 03/26/2024 9.4     Platelets 03/26/2024 283     nRBC 03/26/2024 0     Segmented % 03/26/2024 74     Immature Grans % 03/26/2024 0     Lymphocytes % 03/26/2024 16     Monocytes % 03/26/2024 7     Eosinophils Relative 03/26/2024 3     Basophils Relative 03/26/2024 0     Absolute Neutrophils 03/26/2024 6.74     Absolute Immature Grans 03/26/2024 0.03     Absolute Lymphocytes 03/26/2024 1.50     Absolute Monocytes 03/26/2024 0.64     Eosinophils Absolute 03/26/2024 0.26     Basophils Absolute 03/26/2024 0.03     Sodium 03/26/2024 139     Potassium 03/26/2024 3.5     Chloride 03/26/2024 105     CO2 03/26/2024 24     ANION GAP 03/26/2024 10     BUN 03/26/2024 13     Creatinine 03/26/2024 0.76     Glucose 03/26/2024 93     Calcium 03/26/2024 9.3     AST 03/26/2024 9 (L)     ALT 03/26/2024 11     Alkaline Phosphatase 03/26/2024 73     Total Protein 03/26/2024 6.9     Albumin 03/26/2024 4.2     Total Bilirubin 03/26/2024 0.53     eGFR 03/26/2024 87     Lipase 03/26/2024 16     C.difficile toxin by PCR 03/26/2024 Negative     Salmonella sp PCR 03/26/2024 Negative     Shigella sp/Enteroinvasi* 03/26/2024 Negative     Campylobacter sp (jejuni* 03/26/2024 Negative     Shiga toxin 1/Shiga toxi* 03/26/2024 Negative          Radiology Results:   EGD    Result Date: 4/19/2024  Narrative: Table formatting from the original result was not included. [facility] [address] [facility] [address] [phone] DATE OF SERVICE: 4/19/24 PHYSICIAN(S): Attending: Dick Nunn DO Fellow: No Staff Documented INDICATION: Nausea, Epigastric pain, Acute gastritis without hemorrhage, unspecified gastritis type, Gastroesophageal reflux disease,  unspecified whether esophagitis present POST-OP DIAGNOSIS: See the impression below. PREPROCEDURE: Informed consent was obtained for the procedure, including sedation.  Risks of perforation, hemorrhage, adverse drug reaction and aspiration were discussed. The patient was placed in the left lateral decubitus position. Patient was explained about the risks and benefits of the procedure. Risks including but not limited to bleeding, infection, and perforation were explained in detail. Also explained about less than 100% sensitivity with the exam and other alternatives. PROCEDURE: EGD DETAILS OF PROCEDURE: Patient was taken to the procedure room where a time out was performed to confirm correct patient and correct procedure. The patient underwent monitored anesthesia care, which was administered by an anesthesia professional. The patient's blood pressure, heart rate, level of consciousness, respirations, oxygen, ECG and ETCO2 were monitored throughout the procedure. The scope was introduced through the mouth and advanced to the second part of the duodenum. Retroflexion was performed in the cardia, fundus and incisura. Prior to the procedure, the patient's H. Pylori status was unknown. The patient experienced no blood loss. The procedure was moderately difficult due to retained food. The patient tolerated the procedure well. There were no apparent adverse events. ANESTHESIA INFORMATION: ASA: II Anesthesia Type: IV Sedation with Anesthesia MEDICATIONS: No administrations occurring from 0720 to 0741 on 04/19/24 FINDINGS: Irregular Z-line 39 cm from the incisors Food bolus in the prepyloric region. Large amount of retained food in the stomach. Procedure aborted due to risk of aspiration. The upper third of the esophagus, middle third of the esophagus and lower third of the esophagus appeared normal. Z-line is 39 cm from the incisors. The fundus of the stomach, body of the stomach and incisura appeared normal. Performed  random biopsy using biopsy forceps to rule out H. pylori. The duodenal bulb, 1st part of the duodenum and 2nd part of the duodenum appeared normal. Performed random biopsy using biopsy forceps to rule out celiac disease. SPECIMENS: ID Type Source Tests Collected by Time Destination 1 : cold forcep bx R/O celiacs Tissue Duodenum TISSUE EXAM Dick Nunn  4/19/2024  7:35 AM  2 : cold forcep bx R/O H pylori Tissue Stomach TISSUE EXAM Dick NunnDO 4/19/2024  7:36 AM      Impression: Irregular Z-line Food bolus in the prepyloric region The upper third of the esophagus, middle third of the esophagus and lower third of the esophagus appeared normal. The fundus of the stomach, body of the stomach and incisura appeared normal. Performed random biopsy to rule out H. pylori. The duodenal bulb, 1st part of the duodenum and 2nd part of the duodenum appeared normal. Performed random biopsy to rule out celiac disease. RECOMMENDATION:  Await pathology results  Follow up with GI Clinic  Continue omeprazole 40 mg daily as directed Consider a trial of period off of Ozempic to see if symptoms resolve if no other cause identified    Dick Nunn DO     CT abdomen pelvis with contrast    Result Date: 3/23/2024  Narrative: CT ABDOMEN AND PELVIS WITH IV CONTRAST INDICATION: abd pain n/v/d. COMPARISON: None. TECHNIQUE: CT examination of the abdomen and pelvis was performed. Multiplanar 2D reformatted images were created from the source data. This examination, like all CT scans performed in the [facility], was performed utilizing techniques to minimize radiation dose exposure, including the use of iterative reconstruction and automated exposure control. Radiation dose length product (DLP) for this visit: 786.77 mGy-cm IV Contrast: 100 mL of iohexol (OMNIPAQUE) Enteric Contrast: Not administered. FINDINGS: ABDOMEN LOWER CHEST: No clinically significant abnormality in the visualized lower chest. LIVER/BILIARY TREE:  Unremarkable. GALLBLADDER: No calcified gallstones. No pericholecystic inflammatory change. SPLEEN: Unremarkable. PANCREAS: Unremarkable. ADRENAL GLANDS: Unremarkable. KIDNEYS/URETERS: Bilateral parapelvic cysts. No hydronephrosis. STOMACH AND BOWEL: Colonic diverticulosis without findings of acute diverticulitis. APPENDIX: No findings to suggest appendicitis. ABDOMINOPELVIC CAVITY: No ascites. No pneumoperitoneum. No lymphadenopathy. VESSELS: Unremarkable for patient's age. PELVIS REPRODUCTIVE ORGANS: Post hysterectomy. URINARY BLADDER: Unremarkable. ABDOMINAL WALL/INGUINAL REGIONS: Unremarkable. BONES: No acute fracture or suspicious osseous lesion.     Impression: No evidence of acute intra-abdominal or pelvic pathology Workstation performed: [id number]    Answers submitted by the patient for this visit:  Abdominal Pain Questionnaire (Submitted on 4/15/2024)  Chief Complaint: Abdominal pain  Chronicity: recurrent  Onset: 1 to 4 weeks ago  Onset quality: sudden  Frequency: intermittently  Episode duration: 2 Hours  Progression since onset: gradually improving  Pain location: generalized abdominal region  Pain - numeric: 7/10  Pain quality: aching, burning, cramping, a sensation of fullness  Radiates to: does not radiate  anorexia: Yes  arthralgias: Yes  belching: Yes  constipation: No  diarrhea: Yes  dysuria: No  fever: No  flatus: Yes  frequency: No  headaches: No  hematochezia: No  hematuria: No  melena: No  myalgias: Yes  nausea: Yes  weight loss: Yes  vomiting: No  Aggravated by: eating, movement  Relieved by: being still, recumbency  Diagnostic workup: CT scan, GI consult

## 2024-04-22 NOTE — PATIENT INSTRUCTIONS
Proceed with Xifaxin,1 pill, 3 x daily.   Continue Omeprazole and famotidine, 1 pill of each, 2 x daily.   Schedule a f/u OV in 2 months.   Continue to watch for red flag symptoms.     We did review GI red flag symptoms, including, but not limited to: chronic nausea, vomiting, diarrhea, chills, fever, and unintentional weight loss and should call or contact our office with any changes or concerns. I reviewed with the patient that if they notice any blood while vomiting or in their stool they should contact or office or go to the nearest emergency room for immediate evaluation. The patient was agreeable and verbalized an understanding.

## 2024-04-22 NOTE — TELEPHONE ENCOUNTER
Office notes from today are open and not signed.  A pa cannot be submitted until closed and signed.  Will check to see if notes are closed later today.

## 2024-04-23 ENCOUNTER — TELEPHONE (OUTPATIENT)
Age: 59
End: 2024-04-23

## 2024-04-23 ENCOUNTER — NURSE TRIAGE (OUTPATIENT)
Age: 59
End: 2024-04-23

## 2024-04-23 ENCOUNTER — OFFICE VISIT (OUTPATIENT)
Dept: FAMILY MEDICINE CLINIC | Facility: CLINIC | Age: 59
End: 2024-04-23
Payer: COMMERCIAL

## 2024-04-23 VITALS
DIASTOLIC BLOOD PRESSURE: 88 MMHG | HEART RATE: 80 BPM | SYSTOLIC BLOOD PRESSURE: 112 MMHG | BODY MASS INDEX: 35.47 KG/M2 | OXYGEN SATURATION: 97 % | WEIGHT: 226 LBS | HEIGHT: 67 IN

## 2024-04-23 DIAGNOSIS — Z71.3 ENCOUNTER FOR WEIGHT LOSS COUNSELING: ICD-10-CM

## 2024-04-23 DIAGNOSIS — K63.8219 SMALL INTESTINAL BACTERIAL OVERGROWTH (SIBO): ICD-10-CM

## 2024-04-23 DIAGNOSIS — E66.09 CLASS 2 OBESITY DUE TO EXCESS CALORIES WITHOUT SERIOUS COMORBIDITY WITH BODY MASS INDEX (BMI) OF 39.0 TO 39.9 IN ADULT: Primary | ICD-10-CM

## 2024-04-23 PROCEDURE — 99213 OFFICE O/P EST LOW 20 MIN: CPT | Performed by: STUDENT IN AN ORGANIZED HEALTH CARE EDUCATION/TRAINING PROGRAM

## 2024-04-23 NOTE — TELEPHONE ENCOUNTER
PA for Xifaxan    Submitted via    []CMM-KEY   [][employer]-Case ID #   [x]Faxed to [facility] [employer]   []Other website   []Phone call Case ID #     Office notes sent, clinical questions answered. Awaiting determination    Turnaround time for your insurance to make a decision on your Prior Authorization can take 7-21 business days.

## 2024-04-23 NOTE — TELEPHONE ENCOUNTER
Pt calling asking about prior auth for Xifaxan, I let her know per notes was sent to Insurance this morning.

## 2024-04-23 NOTE — TELEPHONE ENCOUNTER
Patient is asking if prior authorization has been completed for her medication Xifaxan.  Would like someone to call her back.

## 2024-04-23 NOTE — PROGRESS NOTES
Name: Sara Harrell      : 1965      MRN: [id number]  Encounter Provider: Yomi Hill MD  Encounter Date: 2024   Encounter department: [facility]    Assessment & Plan     1. Class 2 obesity due to excess calories without serious comorbidity with body mass index (BMI) of 39.0 to 39.9 in adult    2. Small intestinal bacterial overgrowth (SIBO)    3. Encounter for weight loss counseling  -     semaglutide, 1 mg/dose, (Ozempic) 4 mg/3 mL injection pen; Inject 0.75 mL (1 mg total) under the skin once a week       Will discuss with GI regarding safety of restarting ozempic   Daniel 10 was 230lb, today 226  Will continue with 1 mg ozempic     Subjective      HPI    59-year-old female presents the office 3 months follow-up.  Since last appointment she underwent EGD as well as having SIBO test which revealed overgrowth.  She was prescribed rifaximin however has not gotten it yet.  As result of the EGD she stopped her Ozempic approximately 2 weeks ago at GI recommendation.        Review of Systems   Constitutional:  Negative for activity change, appetite change, chills, fatigue and fever.   HENT:  Negative for congestion, dental problem, drooling, ear discharge, ear pain, facial swelling, postnasal drip, rhinorrhea and sinus pain.    Eyes:  Negative for photophobia, pain, discharge and itching.   Respiratory:  Negative for apnea, cough, chest tightness and shortness of breath.    Cardiovascular:  Negative for chest pain and leg swelling.   Gastrointestinal:  Negative for abdominal distention, abdominal pain, anal bleeding, constipation, diarrhea and nausea.   Endocrine: Negative for cold intolerance, heat intolerance and polydipsia.   Genitourinary:  Negative for difficulty urinating.   Musculoskeletal:  Negative for arthralgias, gait problem, joint swelling and myalgias.   Skin:  Negative for color change and pallor.   Allergic/Immunologic: Negative for immunocompromised state.  "  Neurological:  Negative for dizziness, seizures, facial asymmetry, weakness, light-headedness, numbness and headaches.   Psychiatric/Behavioral:  Negative for agitation, behavioral problems, confusion, decreased concentration and dysphoric mood.    All other systems reviewed and are negative.      Current Outpatient Medications on File Prior to Visit   Medication Sig    cetirizine (ZyrTEC) 10 mg tablet Take 10 mg by mouth    clotrimazole-betamethasone (LOTRISONE) 1-0.05 % cream Apply topically 2 (two) times a day    famotidine (PEPCID) 20 mg tablet Take 1 PO BID.    omeprazole (PriLOSEC) 40 MG capsule Take 1 capsule (40 mg total) by mouth 2 (two) times a day    rifaximin (XIFAXAN) 550 mg tablet Take 1 tablet (550 mg total) by mouth 3 (three) times a day for 14 days    ondansetron (ZOFRAN) 4 mg tablet Take 1 tablet (4 mg total) by mouth every 8 (eight) hours as needed for nausea or vomiting (Patient not taking: Reported on 4/22/2024)    [DISCONTINUED] CRANBERRY PO Take by mouth 2 (two) times a day (Patient not taking: Reported on 4/22/2024)    [DISCONTINUED] dicyclomine (BENTYL) 10 mg capsule Take 1 capsule (10 mg total) by mouth 4 (four) times a day (before meals and at bedtime) (Patient not taking: Reported on 4/22/2024)    [DISCONTINUED] semaglutide, 1 mg/dose, (Ozempic) 4 mg/3 mL injection pen Inject 0.75 mL (1 mg total) under the skin once a week (Patient not taking: Reported on 4/22/2024)       Objective     /88 (BP Location: Left arm, Patient Position: Sitting, Cuff Size: Extra-Large)   Pulse 80   Ht 5' 7\" (1.702 m)   Wt 103 kg (226 lb)   LMP  (LMP Unknown)   SpO2 97%   BMI 35.40 kg/m²     Physical Exam  Vitals and nursing note reviewed.   Constitutional:       Appearance: She is obese.   HENT:      Head: Normocephalic and atraumatic.      Right Ear: Tympanic membrane normal.      Left Ear: Tympanic membrane normal.      Nose: Nose normal.      Mouth/Throat:      Mouth: Mucous membranes are " moist.   Eyes:      Pupils: Pupils are equal, round, and reactive to light.   Cardiovascular:      Rate and Rhythm: Normal rate and regular rhythm.   Pulmonary:      Effort: Pulmonary effort is normal.   Abdominal:      General: Abdomen is flat.   Musculoskeletal:         General: Normal range of motion.      Cervical back: Normal range of motion.   Skin:     General: Skin is warm.      Capillary Refill: Capillary refill takes less than 2 seconds.   Neurological:      General: No focal deficit present.      Mental Status: She is alert and oriented to person, place, and time.   Psychiatric:         Mood and Affect: Mood normal.       Yomi Hill MD

## 2024-04-24 ENCOUNTER — TELEPHONE (OUTPATIENT)
Age: 59
End: 2024-04-24

## 2024-04-24 NOTE — TELEPHONE ENCOUNTER
PA for semaglutide, 1 mg/dose, (Ozempic) 4 mg/3 mL     Submitted via    [x]CMM-KEY [id number]  [][employer]-Case ID #   []Faxed to plan   []Other website   []Phone call Case ID #     Office notes sent, clinical questions answered. Awaiting determination    Turnaround time for your insurance to make a decision on your Prior Authorization can take 7-21 business days.

## 2024-04-24 NOTE — TELEPHONE ENCOUNTER
Pt called to check refill status for Ozempic Pt was advised she needs PA and request was send today to get Prior auth started.

## 2024-04-25 ENCOUNTER — TELEPHONE (OUTPATIENT)
Age: 59
End: 2024-04-25

## 2024-04-25 NOTE — TELEPHONE ENCOUNTER
Patients GI provider:  JAMES Delcid    Number to return call: [phone]    Reason for call: Batsheva from [employer] calling for peer-peer consult. Xifaxan has been denied due to lack of documentation for IBD. Peer-peer call line is [phone].     Scheduled procedure/appointment date if applicable: N/A

## 2024-04-25 NOTE — TELEPHONE ENCOUNTER
PA for semaglutide, 1 mg/dose, (Ozempic) 4 mg/3 mL  Approved   Date(s) approved 9/15/23-9/30/24  Case #    Patient advised by [x] MyChart Message                      [x] Phone call       Pharmacy advised by [x]Fax                                     []Phone call    Approval letter scanned into Media Yes

## 2024-04-25 NOTE — TELEPHONE ENCOUNTER
Patient called the RX Refill Line. Message is being forwarded to the office.     Patient is requesting a callback with an update- Insurance denied Xifaxan and is now requiring a peer to peer. Patient states she was told to contact office since she is still not feeling well. She is wondering if there is something the provider can do for her in the mean time.    Please contact patient at [phone]

## 2024-04-26 NOTE — TELEPHONE ENCOUNTER
Batsheva from [employer] calling for peer-peer consult. Xifaxan has been denied due to lack of documentation for IBD. Peer-peer call line is [phone].      Routing to GI office.    All clinicals were sent with the original PA

## 2024-04-28 NOTE — TELEPHONE ENCOUNTER
PA for Xifaxan Denied    Reason:(Screenshot if applicable)      Documentation needed for diagnosis of IBS-D, Documentation needed pt did not have more than 3 courses of Xifaxan for treatment of IBS-D      Message sent to office clinical pool Yes    Denial letter scanned into Media Yes    Appeal started No ( Provider will need to decide if appeal is warranted and send clinical documentation to PA team for initiation.)

## 2024-04-29 ENCOUNTER — TELEPHONE (OUTPATIENT)
Dept: GASTROENTEROLOGY | Facility: CLINIC | Age: 59
End: 2024-04-29

## 2024-04-29 DIAGNOSIS — K63.8219 SMALL INTESTINAL BACTERIAL OVERGROWTH (SIBO): ICD-10-CM

## 2024-04-29 DIAGNOSIS — K58.0 IRRITABLE BOWEL SYNDROME WITH DIARRHEA: Primary | ICD-10-CM

## 2024-04-29 NOTE — TELEPHONE ENCOUNTER
I called the patient and left a message and then she called me back.    I explained to her that currently we cannot get approval for her suspect irritable bowel syndrome with diarrhea even though she does have positive hydrogen and methane SIBO testing.    I did discuss with the patient that at this point our options would be to consider proceeding with doxycycline 100 mg twice daily for 2 weeks as an alternative treatment option or we can continue to see if our representative can come through with the 2-week sample pack as the patient would prefer to take the Xifaxan if that is our recommended treatment plan she does not want to take too many antibiotics, but is willing to try the doxycycline if he cannot get the sample back from the representative by Wednesday.    At this point she was agreeable to see if we could get the sample pack and then if we cannot she is fine with us ordering the doxycycline and then letting her know that it was ordered. She was very thankful for the phone call.

## 2024-05-01 ENCOUNTER — TELEPHONE (OUTPATIENT)
Dept: GASTROENTEROLOGY | Facility: CLINIC | Age: 59
End: 2024-05-01

## 2024-05-01 RX ORDER — DOXYCYCLINE 100 MG/1
100 TABLET ORAL 2 TIMES DAILY
Qty: 28 TABLET | Refills: 0 | Status: SHIPPED | OUTPATIENT
Start: 2024-05-01 | End: 2024-05-15

## 2024-05-01 NOTE — TELEPHONE ENCOUNTER
Can you please call the patient and let her know that unfortunately we were not able to get her samples of the Xifaxan and as promised and discussed on Monday I did send a prescription for doxycycline 100 mg and she is to take 1 pill 2 times a day for the next 2 weeks and see how she does.    Please let her know that it can take up to 4 to 6 weeks after completing the antibiotics to fully know if they were helpful, although most people do start to notice improvement after even a few days.    She is to continue the rest of her medication regimen as prescribed and as discussed and should follow-up as scheduled.    However, if anything changes, worsens, or is experiencing any new symptoms would like to be evaluated for, she should contact our office or see if she can get a sooner appointment.

## 2024-05-04 PROBLEM — Z12.11 SCREENING FOR COLON CANCER: Status: RESOLVED | Noted: 2024-04-04 | Resolved: 2024-05-04

## 2024-06-21 ENCOUNTER — OFFICE VISIT (OUTPATIENT)
Age: 59
End: 2024-06-21
Payer: COMMERCIAL

## 2024-06-21 ENCOUNTER — NURSE TRIAGE (OUTPATIENT)
Age: 59
End: 2024-06-21

## 2024-06-21 VITALS
HEIGHT: 67 IN | TEMPERATURE: 97.9 F | OXYGEN SATURATION: 98 % | HEART RATE: 86 BPM | SYSTOLIC BLOOD PRESSURE: 120 MMHG | BODY MASS INDEX: 35.38 KG/M2 | RESPIRATION RATE: 16 BRPM | WEIGHT: 225.4 LBS | DIASTOLIC BLOOD PRESSURE: 60 MMHG

## 2024-06-21 DIAGNOSIS — K63.8219 SMALL INTESTINAL BACTERIAL OVERGROWTH (SIBO): ICD-10-CM

## 2024-06-21 DIAGNOSIS — K29.00 ACUTE GASTRITIS WITHOUT HEMORRHAGE, UNSPECIFIED GASTRITIS TYPE: Primary | ICD-10-CM

## 2024-06-21 DIAGNOSIS — R11.0 NAUSEA: ICD-10-CM

## 2024-06-21 DIAGNOSIS — R10.13 EPIGASTRIC PAIN: ICD-10-CM

## 2024-06-21 DIAGNOSIS — K21.9 GASTROESOPHAGEAL REFLUX DISEASE, UNSPECIFIED WHETHER ESOPHAGITIS PRESENT: ICD-10-CM

## 2024-06-21 DIAGNOSIS — Z80.0 FAMILY HISTORY OF COLON CANCER IN MOTHER: ICD-10-CM

## 2024-06-21 PROCEDURE — 99214 OFFICE O/P EST MOD 30 MIN: CPT | Performed by: NURSE PRACTITIONER

## 2024-06-21 RX ORDER — FAMOTIDINE 20 MG/1
TABLET, FILM COATED ORAL
Qty: 45 TABLET | Refills: 4 | Status: SHIPPED | OUTPATIENT
Start: 2024-06-21

## 2024-06-21 RX ORDER — OMEPRAZOLE 40 MG/1
40 CAPSULE, DELAYED RELEASE ORAL DAILY
Qty: 90 CAPSULE | Refills: 1 | Status: SHIPPED | OUTPATIENT
Start: 2024-06-21

## 2024-06-21 RX ORDER — OMEPRAZOLE 40 MG/1
40 CAPSULE, DELAYED RELEASE ORAL 2 TIMES DAILY
Qty: 90 CAPSULE | Refills: 1 | Status: SHIPPED | OUTPATIENT
Start: 2024-06-21 | End: 2024-06-21 | Stop reason: SDUPTHER

## 2024-06-21 RX ORDER — FAMOTIDINE 20 MG/1
TABLET, FILM COATED ORAL
Qty: 60 TABLET | Refills: 2 | Status: SHIPPED | OUTPATIENT
Start: 2024-06-21 | End: 2024-06-21 | Stop reason: SDUPTHER

## 2024-06-21 NOTE — PATIENT INSTRUCTIONS
Continue Omeprazole 40 mg, 1 pill daily, in AM prior to a meal.   Continue Famotidine 20 mg, 1 pill at bed time for the next 3-4 weeks, then as needed.   Continue to avoid trigger foods or pre-medicate as much as possible.   Start 1/2 capful of Miralax daily.   Continue to try to drink as close to 64 oz of water daily.   Continue to watch for red flag symptoms.   Schedule a f/u OV in 6 months.     We did review GI red flag symptoms, including, but not limited to: chronic nausea, vomiting, diarrhea, chills, fever, and unintentional weight loss and should call or contact our office with any changes or concerns. I reviewed with the patient that if they notice any blood while vomiting or in their stool they should contact or office or go to the nearest emergency room for immediate evaluation. The patient was agreeable and verbalized an understanding.

## 2024-06-21 NOTE — PROGRESS NOTES
[facility] - Outpatient Follow-up Note  Sara Harrell 59 y.o. female MRN: [id number]  Encounter: [id number]          ASSESSMENT AND PLAN:    The patient presents today for follow-up office visit regarding acute gastritis, nausea, GERD symptoms, epigastric pain, and SIBO.    Exam: Abdomen soft, nondistended, nontender, with hypoactive bowel sounds x 4. No edema noted of the b/l lower extremities upon exam today. Skin is non-icteric.      1. Acute gastritis without hemorrhage, unspecified gastritis type  -     famotidine (PEPCID) 20 mg tablet; Take 1 PO BID PRN.  -     omeprazole (PriLOSEC) 40 MG capsule; Take 1 capsule (40 mg total) by mouth daily Take 1 PO QD in AM prior to a meal.  2. Epigastric pain  -     famotidine (PEPCID) 20 mg tablet; Take 1 PO BID PRN.  -     omeprazole (PriLOSEC) 40 MG capsule; Take 1 capsule (40 mg total) by mouth daily Take 1 PO QD in AM prior to a meal.  3. Nausea  -     famotidine (PEPCID) 20 mg tablet; Take 1 PO BID PRN.  -     omeprazole (PriLOSEC) 40 MG capsule; Take 1 capsule (40 mg total) by mouth daily Take 1 PO QD in AM prior to a meal.  4. Gastroesophageal reflux disease, unspecified whether esophagitis present  Assessment & Plan:  Improved/Stable  Continue omeprazole 40 mg daily in a.m. prior to a meal as prescribed.  Will discuss continued de-escalation of omeprazole at next visit.  Continue famotidine 20 mg, 1 pill daily for the next 3 to 4 weeks and then can use it 1 p.o. twice daily as needed for breakthrough reflux from there.  Continue to avoid trigger foods or premedicate is much as possible.  Orders:  -     famotidine (PEPCID) 20 mg tablet; Take 1 PO BID PRN.  -     omeprazole (PriLOSEC) 40 MG capsule; Take 1 capsule (40 mg total) by mouth daily Take 1 PO QD in AM prior to a meal.  5. Small intestinal bacterial overgrowth (SIBO)  Assessment & Plan:  Improved/Stable  To help with the harder stools can start a half a capful of  MiraLAX daily.  Encouraged the patient to continue to try to drink at least 64 ounces of water daily.  6. Family history of colon cancer in mother  Assessment & Plan:  We will hold off on any repeat colonoscopies until the recommended surveillance date unless the patient would start to develop red flag symptoms in the future.  The patient was agreeable and verbalized an understanding.  DUE: 7/21/26    Reviewed GI red flag symptoms with patient today.      The patient will schedule a follow up office visit in 6 months, but understands to call or contact our office if there are any issues or concerns in the mean time.  ______________________________________________________________________    SUBJECTIVE: Patient is a 59 y.o. female who was previously seen in our office on 4/22/24 and presents today for follow-up office visit regarding acute gastritis, nausea, GERD symptoms, epigastric pain, and SIBO.  Since the patient's last office visit as we were not able to get the Xifaxan approved through her insurance even though she met all the qualifications and we were not able to obtain an count of samples we did proceed with 2 weeks of doxycycline 100 mg twice daily, which the patient reports has significantly improved her overall symptoms.  She reports that the only issue she has is that sometimes when she drinks seltzer water or soda, which she does not do often, it does seem to cause a little bit of nausea or stomach upset, but nothing significant and overall has slowly returned to her regular diet, except that she is not eating pork, without any significant issues.  She reports she has not needed to use the Zofran for nausea.    She reports that she had gone away over Memorial Day weekend and forgot her medications and only had enough of the famotidine and omeprazole to take 1 a day and since she was away for 4 days and did not notice any increase or changes in her symptoms, she is continue to just stay on the omeprazole  and famotidine once a day instead of twice daily dosing.    She reports that the only other thing that has changed now that she is back to her regular diet if she is noticing some straining/harder stools with occasional blood when wiping.    The patient denies any reflux, dysphagia, vomiting, decreased appetite, unplanned weight loss, or abdominal pain. Water Intake: 48 oz per day.    The patient reports that they have a BM 1-2 x daily and reports that it is relieving, without any consistent diarrhea, nocturnal BMs, constipation, melena or bloody stools. Last BM: Yesterday. Flatus: Minimal.    Meds: Omeprazole 40 mg daily, famotidine 20 mg daily, and Zofran as needed.  Daily NSAID Use: Denied    Imaging: (None):     Endoscopy History: EGD: (24): Relatively normal with a food bolus noted in the prepyloric region. Path: Normal.     COLONOSCOPY: (21): Scattered diverticulosis throughout the colon, otherwise normal with a recommendation of a repeat colonoscopy in 5 years secondary to a family history of colon cancer.     DUE: 26    REVIEW OF SYSTEMS IS OTHERWISE NEGATIVE.      Historical Information   Past Medical History:   Diagnosis Date    Allergic     Asthma due to seasonal allergies     GERD (gastroesophageal reflux disease)     Kidney stone     Pneumonia     Urinary tract infection      Past Surgical History:   Procedure Laterality Date    APPENDECTOMY  10/1982     SECTION      two-  &     HYSTERECTOMY      partial    JOINT REPLACEMENT      REPLACEMENT TOTAL KNEE BILATERAL      right- , left-      Social History   Social History     Substance and Sexual Activity   Alcohol Use Yes    Alcohol/week: 4.0 standard drinks of alcohol    Types: 2 Glasses of wine, 2 Shots of liquor per week    Comment: occasional     Social History     Substance and Sexual Activity   Drug Use No     Social History     Tobacco Use   Smoking Status Never   Smokeless Tobacco Never     Family History  "  Problem Relation Age of Onset    Heart disease Father     Hypertension Father     Urolithiasis Mother     Colon cancer Mother     Arthritis Mother     Cancer Mother         Colon cancer    Hearing loss Mother     Kidney cancer Brother     Urolithiasis Brother     Hepatitis Brother         hep c    Kidney disease Brother     Urolithiasis Sister     Diabetes Sister     Kidney disease Sister     Urolithiasis Daughter        Meds/Allergies       Current Outpatient Medications:     cetirizine (ZyrTEC) 10 mg tablet    clotrimazole-betamethasone (LOTRISONE) 1-0.05 % cream    famotidine (PEPCID) 20 mg tablet    omeprazole (PriLOSEC) 40 MG capsule    semaglutide, 1 mg/dose, (Ozempic) 4 mg/3 mL injection pen    ondansetron (ZOFRAN) 4 mg tablet    Allergies   Allergen Reactions    Prednisone Other (See Comments)     Headaches and blood pressure spikes      Sulfa Antibiotics Hives    Vicodin [Hydrocodone-Acetaminophen] Anxiety    Codeine Hives    Colistin      Other reaction(s): Other (See Comments)  colymycin otic    Other Itching     Other reaction(s): Other (See Comments)  burn           Objective     Height 5' 7\" (1.702 m). Body mass index is 35.4 kg/m².      PHYSICAL EXAM:      General Appearance:   Alert, cooperative, no distress   HEENT:   Normocephalic, atraumatic, anicteric.     Neck:  Supple, symmetrical, trachea midline   Lungs:   Clear to auscultation bilaterally; no rales, rhonchi or wheezing; respirations unlabored    Heart::   Regular rate and rhythm; no murmur, rub, or gallop.   Abdomen:   Soft, non-tender, non-distended; normal bowel sounds; no masses, no organomegaly    Genitalia:   Deferred    Rectal:   Deferred    Extremities:  No cyanosis, clubbing or edema    Pulses:  2+ and symmetric    Skin:  No jaundice, rashes, or lesions    Lymph nodes:  No palpable cervical lymphadenopathy        Lab Results:   No visits with results within 1 Day(s) from this visit.   Latest known visit with results is:   Hospital " Outpatient Visit on 04/19/2024   Component Date Value    Case Report 04/19/2024                      Value:Surgical Pathology Report                         Case: [id number]                                  Authorizing Provider:  Dick Nunn DO              Collected:           04/19/2024 0735              Ordering Location:     [facility] Received:            04/19/2024 1300                                     Operating Room                                                               Pathologist:           Bg Curry MD                                                            Specimens:   A) - Duodenum, cold forcep bx R/O celiacs                                                           B) - Stomach, cold forcep bx R/O H pylori                                                  Final Diagnosis 04/19/2024                      Value:This result contains rich text formatting which cannot be displayed here.    Note 04/19/2024                      Value:This result contains rich text formatting which cannot be displayed here.    Additional Information 04/19/2024                      Value:This result contains rich text formatting which cannot be displayed here.    Gross Description 04/19/2024                      Value:This result contains rich text formatting which cannot be displayed here.    Clinical Information 04/19/2024                      Value:GERD  · Irregular Z-line 39 cm from the incisors  · Food bolus in the prepyloric region. Large amount of retained food in the stomach. Procedure aborted due to risk of aspiration.  · The upper third of the esophagus, middle third of the esophagus and lower third of the esophagus appeared normal. Z-line is 39 cm from the incisors.  · The fundus of the stomach, body of the stomach and incisura appeared normal. Performed random biopsy using biopsy forceps to rule out H. pylori.  · The duodenal bulb, 1st part of the duodenum and 2nd part of the duodenum  appeared normal. Performed random biopsy using biopsy forceps to rule out celiac disease.                 Radiology Results:   No results found.   Answers submitted by the patient for this visit:  Abdominal Pain Questionnaire (Submitted on 6/14/2024)  Chief Complaint: Abdominal pain  Progression since onset: resolved  anorexia: No  arthralgias: Yes  belching: No  constipation: Yes  diarrhea: No  dysuria: No  fever: No  flatus: No  frequency: No  headaches: No  hematochezia: Yes  hematuria: No  melena: No  myalgias: Yes  nausea: No  weight loss: No  vomiting: No

## 2024-06-21 NOTE — ASSESSMENT & PLAN NOTE
Improved/Stable  Continue omeprazole 40 mg daily in a.m. prior to a meal as prescribed.  Will discuss continued de-escalation of omeprazole at next visit.  Continue famotidine 20 mg, 1 pill daily for the next 3 to 4 weeks and then can use it 1 p.o. twice daily as needed for breakthrough reflux from there.  Continue to avoid trigger foods or premedicate is much as possible.

## 2024-06-21 NOTE — ASSESSMENT & PLAN NOTE
We will hold off on any repeat colonoscopies until the recommended surveillance date unless the patient would start to develop red flag symptoms in the future.  The patient was agreeable and verbalized an understanding.  DUE: 7/21/26    Reviewed GI red flag symptoms with patient today.

## 2024-06-21 NOTE — TELEPHONE ENCOUNTER
The script for omeprazole states take one pill before breakfast but is then ordered twice a day.   Pharmacy Calling for clarification.  Reached out to provider via secure chat and it should be just once a day.  He will fix script and resent to pharmacy.   Reason for Disposition   Caller has medicine question only, adult not sick, and triager answers question    Answer Assessment - Initial Assessment Questions  1. NAME of MEDICATION:The script for omeprazole states take one pill before breakfast but is then ordered twice a day.  Calling for clarification.  Reached out to provider via secure chat and it should be just once a day.  He will fix script and resent to pharmacy.    Protocols used: Medication Question Call-ADULT-[place]

## 2024-06-21 NOTE — ASSESSMENT & PLAN NOTE
Improved/Stable  To help with the harder stools can start a half a capful of MiraLAX daily.  Encouraged the patient to continue to try to drink at least 64 ounces of water daily.

## 2024-07-16 ENCOUNTER — RA CDI HCC (OUTPATIENT)
Dept: OTHER | Facility: HOSPITAL | Age: 59
End: 2024-07-16

## 2024-07-23 ENCOUNTER — OFFICE VISIT (OUTPATIENT)
Dept: FAMILY MEDICINE CLINIC | Facility: CLINIC | Age: 59
End: 2024-07-23
Payer: COMMERCIAL

## 2024-07-23 VITALS
TEMPERATURE: 97.4 F | SYSTOLIC BLOOD PRESSURE: 112 MMHG | OXYGEN SATURATION: 97 % | DIASTOLIC BLOOD PRESSURE: 72 MMHG | HEART RATE: 70 BPM | BODY MASS INDEX: 35.63 KG/M2 | WEIGHT: 227 LBS | HEIGHT: 67 IN

## 2024-07-23 DIAGNOSIS — Z71.3 ENCOUNTER FOR WEIGHT LOSS COUNSELING: Primary | ICD-10-CM

## 2024-07-23 DIAGNOSIS — E66.09 CLASS 2 OBESITY DUE TO EXCESS CALORIES WITHOUT SERIOUS COMORBIDITY WITH BODY MASS INDEX (BMI) OF 39.0 TO 39.9 IN ADULT: Primary | ICD-10-CM

## 2024-07-23 PROCEDURE — 99213 OFFICE O/P EST LOW 20 MIN: CPT | Performed by: STUDENT IN AN ORGANIZED HEALTH CARE EDUCATION/TRAINING PROGRAM

## 2024-07-23 NOTE — PROGRESS NOTES
Ambulatory Visit  Name: Sara Harrell      : 1965      MRN: [id number]  Encounter Provider: Yomi Hill MD  Encounter Date: 2024   Encounter department: [facility]    Assessment & Plan   1. Encounter for weight loss counseling  -     semaglutide, 2 mg/dose, (Ozempic) 8 mg/ mL injection pen; Inject 0.75 mL (2 mg total) under the skin every 7 days     Daniel 10 was 230lb,  now 227  Currently on 1mg will increase to next dose due to plateau    History of Present Illness       HPI      This is a 59 y.o. female who presents to the office for routine follow-up of chronic medical conditions.  Overall they report feeling well they deny any significant interval history since her last appointment.  They deny any new issues and they are taking her prescribed medications as instructed without any side effects or concerns.          Review of Systems   Constitutional:  Negative for activity change, appetite change, chills, fatigue and fever.   HENT:  Negative for congestion, dental problem, drooling, ear discharge, ear pain, facial swelling, postnasal drip, rhinorrhea and sinus pain.    Eyes:  Negative for photophobia, pain, discharge and itching.   Respiratory:  Negative for apnea, cough, chest tightness and shortness of breath.    Cardiovascular:  Negative for chest pain and leg swelling.   Gastrointestinal:  Negative for abdominal distention, abdominal pain, anal bleeding, constipation, diarrhea and nausea.   Endocrine: Negative for cold intolerance, heat intolerance and polydipsia.   Genitourinary:  Negative for difficulty urinating.   Musculoskeletal:  Negative for arthralgias, gait problem, joint swelling and myalgias.   Skin:  Negative for color change and pallor.   Allergic/Immunologic: Negative for immunocompromised state.   Neurological:  Negative for dizziness, seizures, facial asymmetry, weakness, light-headedness, numbness and headaches.   Psychiatric/Behavioral:   "Negative for agitation, behavioral problems, confusion, decreased concentration and dysphoric mood.    All other systems reviewed and are negative.      Objective     /72 (BP Location: Left arm, Patient Position: Sitting, Cuff Size: Large)   Pulse 70   Temp (!) 97.4 °F (36.3 °C) (Tympanic)   Ht 5' 7\" (1.702 m)   Wt 103 kg (227 lb)   LMP  (LMP Unknown)   SpO2 97%   BMI 35.55 kg/m²     Physical Exam  Vitals and nursing note reviewed.   Constitutional:       General: She is not in acute distress.     Appearance: She is well-developed. She is obese. She is not ill-appearing or diaphoretic.   HENT:      Head: Normocephalic and atraumatic.   Eyes:      Conjunctiva/sclera: Conjunctivae normal.      Pupils: Pupils are equal, round, and reactive to light.   Cardiovascular:      Rate and Rhythm: Normal rate and regular rhythm.      Heart sounds: Normal heart sounds. No murmur heard.     No friction rub.   Pulmonary:      Effort: Pulmonary effort is normal. No respiratory distress.      Breath sounds: Normal breath sounds.   Abdominal:      General: Bowel sounds are normal.      Palpations: Abdomen is soft.      Tenderness: There is no abdominal tenderness.   Musculoskeletal:         General: No swelling. Normal range of motion.      Cervical back: Normal range of motion and neck supple.   Skin:     General: Skin is warm and dry.      Capillary Refill: Capillary refill takes less than 2 seconds.   Neurological:      Mental Status: She is alert and oriented to person, place, and time.      Motor: No abnormal muscle tone.      Coordination: Coordination normal.   Psychiatric:         Mood and Affect: Mood normal.         Behavior: Behavior normal.         Thought Content: Thought content normal.       Administrative Statements           "

## 2024-08-01 ENCOUNTER — TELEPHONE (OUTPATIENT)
Age: 59
End: 2024-08-01

## 2024-08-02 ENCOUNTER — APPOINTMENT (OUTPATIENT)
Age: 59
End: 2024-08-02
Payer: COMMERCIAL

## 2024-08-02 DIAGNOSIS — E66.09 CLASS 2 OBESITY DUE TO EXCESS CALORIES WITHOUT SERIOUS COMORBIDITY WITH BODY MASS INDEX (BMI) OF 39.0 TO 39.9 IN ADULT: ICD-10-CM

## 2024-08-02 LAB
ALBUMIN SERPL BCG-MCNC: 4.1 G/DL (ref 3.5–5)
ALP SERPL-CCNC: 69 U/L (ref 34–104)
ALT SERPL W P-5'-P-CCNC: 17 U/L (ref 7–52)
ANION GAP SERPL CALCULATED.3IONS-SCNC: 7 MMOL/L (ref 4–13)
AST SERPL W P-5'-P-CCNC: 13 U/L (ref 13–39)
BASOPHILS # BLD AUTO: 0.06 THOUSANDS/ÂΜL (ref 0–0.1)
BASOPHILS NFR BLD AUTO: 1 % (ref 0–1)
BILIRUB SERPL-MCNC: 0.45 MG/DL (ref 0.2–1)
BUN SERPL-MCNC: 16 MG/DL (ref 5–25)
CALCIUM SERPL-MCNC: 8.9 MG/DL (ref 8.4–10.2)
CHLORIDE SERPL-SCNC: 107 MMOL/L (ref 96–108)
CHOLEST SERPL-MCNC: 172 MG/DL
CO2 SERPL-SCNC: 27 MMOL/L (ref 21–32)
CREAT SERPL-MCNC: 0.68 MG/DL (ref 0.6–1.3)
EOSINOPHIL # BLD AUTO: 0.15 THOUSAND/ÂΜL (ref 0–0.61)
EOSINOPHIL NFR BLD AUTO: 2 % (ref 0–6)
ERYTHROCYTE [DISTWIDTH] IN BLOOD BY AUTOMATED COUNT: 14 % (ref 11.6–15.1)
EST. AVERAGE GLUCOSE BLD GHB EST-MCNC: 97 MG/DL
GFR SERPL CREATININE-BSD FRML MDRD: 96 ML/MIN/1.73SQ M
GLUCOSE P FAST SERPL-MCNC: 92 MG/DL (ref 65–99)
HBA1C MFR BLD: 5 %
HCT VFR BLD AUTO: 47.2 % (ref 34.8–46.1)
HDLC SERPL-MCNC: 51 MG/DL
HGB BLD-MCNC: 14.9 G/DL (ref 11.5–15.4)
IMM GRANULOCYTES # BLD AUTO: 0.02 THOUSAND/UL (ref 0–0.2)
IMM GRANULOCYTES NFR BLD AUTO: 0 % (ref 0–2)
LDLC SERPL CALC-MCNC: 98 MG/DL (ref 0–100)
LYMPHOCYTES # BLD AUTO: 1.79 THOUSANDS/ÂΜL (ref 0.6–4.47)
LYMPHOCYTES NFR BLD AUTO: 22 % (ref 14–44)
MCH RBC QN AUTO: 27.3 PG (ref 26.8–34.3)
MCHC RBC AUTO-ENTMCNC: 31.6 G/DL (ref 31.4–37.4)
MCV RBC AUTO: 87 FL (ref 82–98)
MONOCYTES # BLD AUTO: 0.63 THOUSAND/ÂΜL (ref 0.17–1.22)
MONOCYTES NFR BLD AUTO: 8 % (ref 4–12)
NEUTROPHILS # BLD AUTO: 5.4 THOUSANDS/ÂΜL (ref 1.85–7.62)
NEUTS SEG NFR BLD AUTO: 67 % (ref 43–75)
NRBC BLD AUTO-RTO: 0 /100 WBCS
PLATELET # BLD AUTO: 279 THOUSANDS/UL (ref 149–390)
PMV BLD AUTO: 10.2 FL (ref 8.9–12.7)
POTASSIUM SERPL-SCNC: 3.7 MMOL/L (ref 3.5–5.3)
PROT SERPL-MCNC: 6.4 G/DL (ref 6.4–8.4)
RBC # BLD AUTO: 5.45 MILLION/UL (ref 3.81–5.12)
SODIUM SERPL-SCNC: 141 MMOL/L (ref 135–147)
T4 FREE SERPL-MCNC: 0.87 NG/DL (ref 0.61–1.12)
TRIGL SERPL-MCNC: 114 MG/DL
TSH SERPL DL<=0.05 MIU/L-ACNC: 5.48 UIU/ML (ref 0.45–4.5)
WBC # BLD AUTO: 8.05 THOUSAND/UL (ref 4.31–10.16)

## 2024-08-02 PROCEDURE — 80053 COMPREHEN METABOLIC PANEL: CPT

## 2024-08-02 PROCEDURE — 83036 HEMOGLOBIN GLYCOSYLATED A1C: CPT

## 2024-08-02 PROCEDURE — 84443 ASSAY THYROID STIM HORMONE: CPT

## 2024-08-02 PROCEDURE — 36415 COLL VENOUS BLD VENIPUNCTURE: CPT

## 2024-08-02 PROCEDURE — 80061 LIPID PANEL: CPT

## 2024-08-02 PROCEDURE — 85025 COMPLETE CBC W/AUTO DIFF WBC: CPT

## 2024-08-02 PROCEDURE — 84439 ASSAY OF FREE THYROXINE: CPT

## 2024-08-07 ENCOUNTER — APPOINTMENT (OUTPATIENT)
Dept: URGENT CARE | Facility: CLINIC | Age: 59
End: 2024-08-07

## 2024-10-01 ENCOUNTER — TELEPHONE (OUTPATIENT)
Age: 59
End: 2024-10-01

## 2024-10-01 ENCOUNTER — NURSE TRIAGE (OUTPATIENT)
Age: 59
End: 2024-10-01

## 2024-10-01 ENCOUNTER — OFFICE VISIT (OUTPATIENT)
Dept: FAMILY MEDICINE CLINIC | Facility: CLINIC | Age: 59
End: 2024-10-01
Payer: COMMERCIAL

## 2024-10-01 ENCOUNTER — TELEPHONE (OUTPATIENT)
Dept: FAMILY MEDICINE CLINIC | Facility: CLINIC | Age: 59
End: 2024-10-01

## 2024-10-01 VITALS
HEART RATE: 79 BPM | DIASTOLIC BLOOD PRESSURE: 92 MMHG | SYSTOLIC BLOOD PRESSURE: 140 MMHG | BODY MASS INDEX: 35 KG/M2 | OXYGEN SATURATION: 99 % | HEIGHT: 67 IN | TEMPERATURE: 97.4 F | WEIGHT: 223 LBS

## 2024-10-01 DIAGNOSIS — E66.09 CLASS 2 OBESITY DUE TO EXCESS CALORIES WITHOUT SERIOUS COMORBIDITY WITH BODY MASS INDEX (BMI) OF 39.0 TO 39.9 IN ADULT: ICD-10-CM

## 2024-10-01 DIAGNOSIS — R10.13 EPIGASTRIC PAIN: ICD-10-CM

## 2024-10-01 DIAGNOSIS — K58.0 IRRITABLE BOWEL SYNDROME WITH DIARRHEA: Primary | ICD-10-CM

## 2024-10-01 DIAGNOSIS — E66.812 CLASS 2 OBESITY DUE TO EXCESS CALORIES WITHOUT SERIOUS COMORBIDITY WITH BODY MASS INDEX (BMI) OF 39.0 TO 39.9 IN ADULT: ICD-10-CM

## 2024-10-01 DIAGNOSIS — K29.00 ACUTE GASTRITIS WITHOUT HEMORRHAGE, UNSPECIFIED GASTRITIS TYPE: ICD-10-CM

## 2024-10-01 DIAGNOSIS — K21.9 GASTROESOPHAGEAL REFLUX DISEASE WITHOUT ESOPHAGITIS: ICD-10-CM

## 2024-10-01 DIAGNOSIS — K21.9 GASTROESOPHAGEAL REFLUX DISEASE, UNSPECIFIED WHETHER ESOPHAGITIS PRESENT: ICD-10-CM

## 2024-10-01 DIAGNOSIS — Z23 ENCOUNTER FOR IMMUNIZATION: Primary | ICD-10-CM

## 2024-10-01 DIAGNOSIS — R11.0 NAUSEA: ICD-10-CM

## 2024-10-01 PROCEDURE — 90673 RIV3 VACCINE NO PRESERV IM: CPT | Performed by: STUDENT IN AN ORGANIZED HEALTH CARE EDUCATION/TRAINING PROGRAM

## 2024-10-01 PROCEDURE — 99396 PREV VISIT EST AGE 40-64: CPT | Performed by: STUDENT IN AN ORGANIZED HEALTH CARE EDUCATION/TRAINING PROGRAM

## 2024-10-01 PROCEDURE — 90471 IMMUNIZATION ADMIN: CPT | Performed by: STUDENT IN AN ORGANIZED HEALTH CARE EDUCATION/TRAINING PROGRAM

## 2024-10-01 RX ORDER — FAMOTIDINE 20 MG/1
20 TABLET, FILM COATED ORAL 2 TIMES DAILY
Qty: 60 TABLET | Refills: 3 | Status: SHIPPED | OUTPATIENT
Start: 2024-10-01

## 2024-10-01 RX ORDER — DIPHENOXYLATE HCL/ATROPINE 2.5-.025MG
1 TABLET ORAL 4 TIMES DAILY PRN
Qty: 45 TABLET | Refills: 1 | Status: SHIPPED | OUTPATIENT
Start: 2024-10-01

## 2024-10-01 RX ORDER — OMEPRAZOLE 40 MG/1
40 CAPSULE, DELAYED RELEASE ORAL DAILY
Qty: 90 CAPSULE | Refills: 1 | Status: SHIPPED | OUTPATIENT
Start: 2024-10-01

## 2024-10-01 RX ORDER — DICYCLOMINE HYDROCHLORIDE 10 MG/1
10 CAPSULE ORAL
Qty: 75 CAPSULE | Refills: 2 | Status: SHIPPED | OUTPATIENT
Start: 2024-10-01

## 2024-10-01 NOTE — TELEPHONE ENCOUNTER
PATIENT DECLINED TO HAVE ANNUAL PHYSICAL EXAM SCHEDULED FOR HER WITH PCP DR ALDANA DUE TO THE FACT THAT SHE RECENTLY HAD A CDL EXAM DONE

## 2024-10-01 NOTE — TELEPHONE ENCOUNTER
At this point in time we would not start an antibiotic for 1 or 2 days of symptoms as we try not to use the antibiotics too frequently.     At this point I would like her to start taking dicyclomine 10 mg 4 times daily, and I send a prescription with refills for that.    I also sent a prescription for Lomotil and she can take up to 4 times a day as needed for diarrhea or loose stools.    I would like her to decrease her omeprazole back down to just once a day.    She can continue the famotidine 20 mg twice a day for now as well.    We would want her to give this regimen a few days and she does not see improvement over the next week or so then we would discuss the possibility of the Xifaxan, however, it is not in her best interest to just prescribe this rifaximin anytime the symptoms start and it is not indicated for 1 or 2 days of symptoms.    It is common with irritable bowel syndrome with diarrhea to have flareups so lets try this first and try to hold off on the antibiotics right away.

## 2024-10-01 NOTE — PROGRESS NOTES
Adult Annual Physical  Name: Sara Harrell      : 1965      MRN: [id number]  Encounter Provider: Yomi Hill MD  Encounter Date: 10/1/2024   Encounter department: [facility] PRIMARY CARE    Assessment & Plan  Encounter for immunization    Orders:    influenza vaccine, recombinant, PF, 0.5 mL IM (Flublok)    Annual physical exam         Immunizations and preventive care screenings were discussed with patient today. Appropriate education was printed on patient's after visit summary.    Counseling:  Alcohol/drug use: discussed moderation in alcohol intake, the recommendations for healthy alcohol use, and avoidance of illicit drug use.  Dental Health: discussed importance of regular tooth brushing, flossing, and dental visits.  Injury prevention: discussed safety/seat belts, safety helmets, smoke detectors, carbon monoxide detectors, and smoking near bedding or upholstery.  Sexual health: discussed sexually transmitted diseases, partner selection, use of condoms, avoidance of unintended pregnancy, and contraceptive alternatives.  Exercise: the importance of regular exercise/physical activity was discussed. Recommend exercise 3-5 times per week for at least 30 minutes.          History of Present Illness   {Disappearing Hyperlinks I Encounters * My Last Note * Since Last Visit * History :45288}  Adult Annual Physical:  Patient presents for annual physical.     Diet and Physical Activity:  - Diet/Nutrition: well balanced diet.  - Exercise: no formal exercise.    General Health:  - Sleep: sleeps well.  - Hearing: normal hearing right ear and normal hearing left ear.  - Vision: no vision problems and vision problems.  - Dental: regular dental visits.    /GYN Health:  - Follows with GYN: no.     Review of Systems   Constitutional:  Negative for activity change, appetite change, chills, fatigue and fever.   HENT:  Negative for congestion, dental problem, drooling, ear discharge, ear pain, facial  "swelling, postnasal drip, rhinorrhea and sinus pain.    Eyes:  Negative for photophobia, pain, discharge and itching.   Respiratory:  Negative for apnea, cough, chest tightness and shortness of breath.    Cardiovascular:  Negative for chest pain and leg swelling.   Gastrointestinal:  Negative for abdominal distention, abdominal pain, anal bleeding, constipation, diarrhea and nausea.   Endocrine: Negative for cold intolerance, heat intolerance and polydipsia.   Genitourinary:  Negative for difficulty urinating.   Musculoskeletal:  Negative for arthralgias, gait problem, joint swelling and myalgias.   Skin:  Negative for color change and pallor.   Allergic/Immunologic: Negative for immunocompromised state.   Neurological:  Negative for dizziness, seizures, facial asymmetry, weakness, light-headedness, numbness and headaches.   Psychiatric/Behavioral:  Negative for agitation, behavioral problems, confusion, decreased concentration and dysphoric mood.    All other systems reviewed and are negative.    {Select to Display [facility] (Optional):69202}    Objective   {Disappearing Hyperlinks   Review Vitals * Enter New Vitals * Results Review * Labs * Imaging * Cardiology * Procedures * Lung Cancer Screening * Surgical eConsent :79760}  /92 (BP Location: Left arm, Patient Position: Sitting, Cuff Size: Adult)   Pulse 79   Temp (!) 97.4 °F (36.3 °C) (Tympanic)   Ht 5' 7\" (1.702 m)   Wt 101 kg (223 lb)   LMP  (LMP Unknown)   SpO2 99%   BMI 34.93 kg/m²     Physical Exam  Vitals and nursing note reviewed.   Constitutional:       General: She is not in acute distress.     Appearance: She is well-developed.   HENT:      Head: Normocephalic and atraumatic.   Eyes:      Conjunctiva/sclera: Conjunctivae normal.      Pupils: Pupils are equal, round, and reactive to light.   Cardiovascular:      Rate and Rhythm: Normal rate and regular rhythm.      Heart sounds: Normal heart sounds. No murmur heard.     No friction rub. "   Pulmonary:      Effort: Pulmonary effort is normal.      Breath sounds: Normal breath sounds.   Abdominal:      General: Bowel sounds are normal.      Palpations: Abdomen is soft.   Musculoskeletal:         General: Normal range of motion.      Cervical back: Normal range of motion and neck supple.   Skin:     General: Skin is warm.      Capillary Refill: Capillary refill takes less than 2 seconds.   Neurological:      Mental Status: She is alert and oriented to person, place, and time.      Motor: No abnormal muscle tone.      Coordination: Coordination normal.   Psychiatric:         Behavior: Behavior normal.         Thought Content: Thought content normal.       {Administrative / Billing Section (Optional):03089}

## 2024-10-01 NOTE — TELEPHONE ENCOUNTER
Patient has an appointment this afternoon want to speak with PCP ahead of time. Patient has been having diarrhea can't keep anything down.  Needed to see if PCP needed to consult with GI ahead of her appointment.

## 2024-10-01 NOTE — TELEPHONE ENCOUNTER
"Please advise  Last OV: 6/21/24   Hx: nausea, epigastric pain, GERD, SIBO     Pt calling in, reports yesterday started with severe diarrhea going every hours and was up 4x at night, abdominal cramping/ burning, and belching with bad taste in mouth.   Pt increase omeprazole to BID, added in Pepcid BID and is taking bentyl which helps the cramping and slows down the diarrhea but once it wears off symptoms return.     Pt explains these are the same symptoms she had in April and was diagnosed with SIBO. She was put on doxycycline BID for 2 weeks since rifaxamin was not approved. She was feeling great until yesterday.   No recent antibx use in the last 2 months, no change in diet or medications.  Pt is not eating much due to fear of diarrhea but is trying to stay hydrated. She is trying to stay out of the ED     Pt has PCP appt today and is hoping GI or PCP can order rifaxamin for her this time.      Pt will need refills of bentyl, Pepcid and omeprazole if provider would like her to continue.         Reason for Disposition  • Moderate illness: watery diarrhea>5 episodes, able to function but forced to change daily activities    Answer Assessment - Initial Assessment  1. When did this start and how frequent are your bowel movements? (when/number/day, hour)   Yesterday- every hour and 4 x over night   2. What is the consistency of your bowel movements?   Loose watery   3. Is there any blood/pus in your stools? (Yes - how much (drops, clots, profuse)   No   4. What is normal for you?   No   5. Is there an urgency with meals?   Yes but not eating much   6. ABDOMINAL PAIN: \"Are you having any abdominal pain?\" If yes, ask: \"What does it feel like?\" (e.g., crampy, dull, intermittent, constant)   Yes cramping and burning   ABDOMINAL PAIN SEVERITY: If present, ask: \"How bad is the pain?\"  (e.g., Scale 1-10; mild, moderate, or severe)  Moderate     - MILD (1-3): does not interfere with normal activities, abdomen soft and not " "tender to touch    - MODERATE (4-7): interferes with normal activities or awakens from sleep, tender to touch   - SEVERE (8-10): excruciating pain, doubled over, unable to do any normal activities    Do you have a fever? (Yes - what was it? Did it resolve with treatment (NSAIDs/acetaminophen)?   No   7. VOMITING: \"Are you also vomiting?\" If yes, ask: \"How many times in the past 24 hours?\"   No   8. Are you experiencing any associated symptoms including nausea, bloating, flatulence, tenesmus, or weight loss?   Belching   9. Are you able to tolerate anything by mouth and maintain your hydration? (Assess for dehydration by inquiring about dry/cracked lips, decreased urine output, lightheadedness, heart racing, etc.)   Trying to stay hydrated with oral fluids- gatorade   10. Are you currently taking anything for current symptoms? (Loperamide, oral fluids)   No   11. Do you have any history of C. diff infection (Clostridioides difficile), recent antibiotics, hospitalization, sick contacts, suspicious oral intake, recent travel, GI surgery, PPI (proton pump inhibitor) use, chemotherapy, enteral feeding   No   12. Do you have any history of other conditions such as IBD (Irritable bowel disease), IBS-D (irritable bowel syndrome)? If yes, consider alternative protocol.  No   13. Have you recently had any stool studies (including fecal calprotectin), bloodwork such as a complete blood count, complete metabolic profile, c-reactive protein, erythrocyte sedimentation rate (CBC/CMP/CRP/ESR), imaging (ex: CT (Computed Tomography) scan), or procedures (ex: colonoscopy)?    Protocols used: GI-Acute Diarrheal Infections-ADULT-[place]    "

## 2024-10-01 NOTE — TELEPHONE ENCOUNTER
This has been a recurrent issue for patient.  I am happy to discuss this with her further at her appointment.  Would recommend she reach out to her GI provider as well, where

## 2024-10-02 NOTE — ASSESSMENT & PLAN NOTE
Doing well on injectable GLP however started to plateau continue focus on lifestyle modification

## 2024-10-02 NOTE — PROGRESS NOTES
Ambulatory Visit  Name: Sara Harrell      : 1965      MRN: [id number]  Encounter Provider: Yomi Hill MD  Encounter Date: 10/1/2024   Encounter department: [facility] CARE    Assessment & Plan  Encounter for immunization    Orders:    influenza vaccine, recombinant, PF, 0.5 mL IM (Flublok)    Class 2 obesity due to excess calories without serious comorbidity with body mass index (BMI) of 39.0 to 39.9 in adult       Doing well on injectable GLP however started to plateau continue focus on lifestyle modification     History of Present Illness     HPI  Is a 59-year-old female presents the office to discuss her 3-month interval weight.  Patient feels like she is starting to struggle and feels that she is starting a plateau despite trying to eat healthier.  She endorses she has not been as active as she would like to be but states that she will try to be more active moving forward    Review of Systems   Constitutional:  Negative for activity change, appetite change, chills, fatigue and fever.   HENT:  Negative for congestion, dental problem, drooling, ear discharge, ear pain, facial swelling, postnasal drip, rhinorrhea and sinus pain.    Eyes:  Negative for photophobia, pain, discharge and itching.   Respiratory:  Negative for apnea, cough, chest tightness and shortness of breath.    Cardiovascular:  Negative for chest pain and leg swelling.   Gastrointestinal:  Negative for abdominal distention, abdominal pain, anal bleeding, constipation, diarrhea and nausea.   Endocrine: Negative for cold intolerance, heat intolerance and polydipsia.   Genitourinary:  Negative for difficulty urinating.   Musculoskeletal:  Negative for arthralgias, gait problem, joint swelling and myalgias.   Skin:  Negative for color change and pallor.   Allergic/Immunologic: Negative for immunocompromised state.   Neurological:  Negative for dizziness, seizures, facial asymmetry, weakness, light-headedness,  "numbness and headaches.   Psychiatric/Behavioral:  Negative for agitation, behavioral problems, confusion, decreased concentration and dysphoric mood.    All other systems reviewed and are negative.          Objective     /92 (BP Location: Left arm, Patient Position: Sitting, Cuff Size: Adult)   Pulse 79   Temp (!) 97.4 °F (36.3 °C) (Tympanic)   Ht 5' 7\" (1.702 m)   Wt 101 kg (223 lb)   LMP  (LMP Unknown)   SpO2 99%   BMI 34.93 kg/m²     Physical Exam  Vitals and nursing note reviewed.   Constitutional:       General: She is not in acute distress.     Appearance: She is well-developed. She is obese.   HENT:      Head: Normocephalic and atraumatic.   Eyes:      Conjunctiva/sclera: Conjunctivae normal.      Pupils: Pupils are equal, round, and reactive to light.   Cardiovascular:      Rate and Rhythm: Normal rate and regular rhythm.      Heart sounds: Normal heart sounds. No murmur heard.     No friction rub.   Pulmonary:      Effort: Pulmonary effort is normal.      Breath sounds: Normal breath sounds.   Abdominal:      General: Bowel sounds are normal.      Palpations: Abdomen is soft.   Musculoskeletal:         General: Normal range of motion.      Cervical back: Normal range of motion and neck supple.   Skin:     General: Skin is warm.      Capillary Refill: Capillary refill takes less than 2 seconds.   Neurological:      Mental Status: She is alert and oriented to person, place, and time.      Motor: No abnormal muscle tone.      Coordination: Coordination normal.   Psychiatric:         Behavior: Behavior normal.         Thought Content: Thought content normal.         "

## 2024-10-03 ENCOUNTER — TELEPHONE (OUTPATIENT)
Age: 59
End: 2024-10-03

## 2024-10-03 ENCOUNTER — TELEPHONE (OUTPATIENT)
Dept: GASTROENTEROLOGY | Facility: CLINIC | Age: 59
End: 2024-10-03

## 2024-10-03 DIAGNOSIS — K63.8211 SMALL INTESTINAL BACTERIAL OVERGROWTH (SIBO), HYDROGEN SUBTYPE: ICD-10-CM

## 2024-10-03 DIAGNOSIS — R19.5 LOOSE STOOLS: Primary | ICD-10-CM

## 2024-10-03 DIAGNOSIS — K58.0 IRRITABLE BOWEL SYNDROME WITH DIARRHEA: ICD-10-CM

## 2024-10-03 DIAGNOSIS — R19.5 LOOSE STOOLS: ICD-10-CM

## 2024-10-03 NOTE — TELEPHONE ENCOUNTER
PA for Xifaxan 550 mg SUBMITTED     via    [x][facility]-KEY: [id number]  []Surescripts-Case ID #   []Availity-Auth ID # NDC #   []Faxed to plan   []Other website   []Phone call Case ID #     Office notes sent, clinical questions answered. Awaiting determination    Turnaround time for your insurance to make a decision on your Prior Authorization can take 7-21 business days.

## 2024-10-03 NOTE — TELEPHONE ENCOUNTER
PA for Xifaxan 550 mg APPROVED     Date(s) approved 08/03/2024 - 10/17/2024    Case #[id number]    Patient advised by          [][facility] [employer] Message  [x]Phone call   []LMOM  []L/M to call office as no active Communication consent on file  []Unable to leave detailed message as VM not approved on Communication consent       Pharmacy advised by    [x]Fax  []Phone call    Approval letter scanned into Media Yes

## 2024-10-03 NOTE — TELEPHONE ENCOUNTER
Can you please call the patient and let her know that her primary care provider contacted me and since she is not any better it seems to be getting worse we will prescribe the Xifaxan, however, as long as she understands that we would not be able to prescribe it again for at least 6 to 8 months. I sent a script for the Xifaxin to her pharmacy.     She should continue with the rest of the medications we prescribed earlier in the week and if her symptoms do become unmanageable, she may need to go to the ER for further evaluation. Also, the Xifaxin is probably going to need prior auth so please let her know that and even if we get it approved, probably will be expensive.     Can you please forward this to the prior auth team as well?

## 2024-10-03 NOTE — TELEPHONE ENCOUNTER
Spoke with patient and relayed message. Patient aware and understands xifaxin can not be prescribed for at least 6 to 8 months. She did mention that the last time it was prescribed, they were not able to get it approved and she took doxycycline which did help.

## 2024-10-03 NOTE — TELEPHONE ENCOUNTER
This patient follows with you and looks like she reached out to you earlier this week for similar concerns.  Thoughts?

## 2024-10-03 NOTE — TELEPHONE ENCOUNTER
Patient seen earlier in week.  Having GI issues.  She is not any better and in fact getting worse.      She stated she was to update you.  She does not want to end up in the hospital.  Her daughter is coming in from [place] and she is concerned of dehydration.    Appointment was offered but she is at work    Please contact patient or is there other meds that can be sent in for patient?

## 2024-10-03 NOTE — TELEPHONE ENCOUNTER
Pt calling to f/u on this PA, symptoms are worsening. Please review and provide needed clarification. Please call pt back to provide update.once done.

## 2024-10-21 ENCOUNTER — TELEPHONE (OUTPATIENT)
Age: 59
End: 2024-10-21

## 2024-10-21 ENCOUNTER — OFFICE VISIT (OUTPATIENT)
Dept: FAMILY MEDICINE CLINIC | Facility: CLINIC | Age: 59
End: 2024-10-21
Payer: COMMERCIAL

## 2024-10-21 VITALS
TEMPERATURE: 97.3 F | SYSTOLIC BLOOD PRESSURE: 118 MMHG | OXYGEN SATURATION: 98 % | HEIGHT: 67 IN | WEIGHT: 225.2 LBS | HEART RATE: 65 BPM | DIASTOLIC BLOOD PRESSURE: 76 MMHG | BODY MASS INDEX: 35.35 KG/M2

## 2024-10-21 DIAGNOSIS — Z91.030 ALLERGY TO BEE STING: ICD-10-CM

## 2024-10-21 DIAGNOSIS — L03.114 CELLULITIS OF LEFT UPPER EXTREMITY: Primary | ICD-10-CM

## 2024-10-21 PROCEDURE — 96372 THER/PROPH/DIAG INJ SC/IM: CPT | Performed by: STUDENT IN AN ORGANIZED HEALTH CARE EDUCATION/TRAINING PROGRAM

## 2024-10-21 PROCEDURE — 99213 OFFICE O/P EST LOW 20 MIN: CPT | Performed by: STUDENT IN AN ORGANIZED HEALTH CARE EDUCATION/TRAINING PROGRAM

## 2024-10-21 RX ORDER — CEPHALEXIN 500 MG/1
500 CAPSULE ORAL EVERY 8 HOURS SCHEDULED
Qty: 21 CAPSULE | Refills: 0 | Status: SHIPPED | OUTPATIENT
Start: 2024-10-21 | End: 2024-10-28

## 2024-10-21 RX ORDER — DEXAMETHASONE SODIUM PHOSPHATE 10 MG/ML
10 INJECTION INTRAMUSCULAR; INTRAVENOUS ONCE
Status: COMPLETED | OUTPATIENT
Start: 2024-10-21 | End: 2024-10-21

## 2024-10-21 RX ADMIN — DEXAMETHASONE SODIUM PHOSPHATE 10 MG: 10 INJECTION INTRAMUSCULAR; INTRAVENOUS at 10:50

## 2024-10-21 NOTE — TELEPHONE ENCOUNTER
Please keep patient's appointment on schedule as is, however if she wants to come in earlier I could probably see her at any point this morning.

## 2024-10-21 NOTE — TELEPHONE ENCOUNTER
Stacy called in that she was stung by a bee and she is having a reaction to it.  It is hot to touch and a hard spot.  Swollen and itchy.    She stated no breathing problems.  Appointment made for 11AM today but she would like to be seen sooner if possible.    Can she get worked in earlier?

## 2024-10-21 NOTE — PROGRESS NOTES
Ambulatory Visit  Name: Sara Harrell      : 1965      MRN: [id number]  Encounter Provider: Yomi Hill MD  Encounter Date: 10/21/2024   Encounter department: [facility] PRIMARY CARE    Assessment & Plan  Cellulitis of left upper extremity    Partial allergic/partial cellulitic rash.  Borders outlined patient will call for worsening symptoms will treat with Keflex and Decadron.    Orders:    cephalexin (KEFLEX) 500 mg capsule; Take 1 capsule (500 mg total) by mouth every 8 (eight) hours for 7 days    dexamethasone (DECADRON) injection 10 mg    Allergy to bee sting            History of Present Illness     HPI      Reports being stung by a yellowjacket on Saturday.  She has been using benadryl and aleve with minimal improvements in swelling.  Reports swelling is getting worse and the area is warm.  No systemic symptoms, no known anaphylactic reactions.           Review of Systems   Constitutional:  Negative for activity change, appetite change, chills, fatigue and fever.   HENT:  Negative for congestion, dental problem, drooling, ear discharge, ear pain, facial swelling, postnasal drip, rhinorrhea and sinus pain.    Eyes:  Negative for photophobia, pain, discharge and itching.   Respiratory:  Negative for apnea, cough, chest tightness and shortness of breath.    Cardiovascular:  Negative for chest pain and leg swelling.   Gastrointestinal:  Negative for abdominal distention, abdominal pain, anal bleeding, constipation, diarrhea and nausea.   Endocrine: Negative for cold intolerance, heat intolerance and polydipsia.   Genitourinary:  Negative for difficulty urinating.   Musculoskeletal:  Negative for arthralgias, gait problem, joint swelling and myalgias.   Skin:  Positive for rash. Negative for color change, pallor and wound.   Allergic/Immunologic: Negative for immunocompromised state.   Neurological:  Negative for dizziness, seizures, facial asymmetry, weakness, light-headedness, numbness  "and headaches.   Psychiatric/Behavioral:  Negative for agitation, behavioral problems, confusion, decreased concentration and dysphoric mood.    All other systems reviewed and are negative.          Objective     /76 (BP Location: Right arm, Patient Position: Sitting, Cuff Size: Large)   Pulse 65   Temp (!) 97.3 °F (36.3 °C) (Tympanic)   Ht 5' 7\" (1.702 m)   Wt 102 kg (225 lb 3.2 oz)   LMP  (LMP Unknown)   SpO2 98%   BMI 35.27 kg/m²     Physical Exam  Vitals and nursing note reviewed.   Constitutional:       General: She is not in acute distress.     Appearance: She is well-developed.   HENT:      Head: Normocephalic and atraumatic.   Eyes:      Conjunctiva/sclera: Conjunctivae normal.      Pupils: Pupils are equal, round, and reactive to light.   Cardiovascular:      Rate and Rhythm: Normal rate and regular rhythm.      Heart sounds: Normal heart sounds. No murmur heard.     No friction rub.   Pulmonary:      Effort: Pulmonary effort is normal.      Breath sounds: Normal breath sounds.   Abdominal:      General: Bowel sounds are normal.      Palpations: Abdomen is soft.   Musculoskeletal:         General: Normal range of motion.      Cervical back: Normal range of motion and neck supple.   Skin:     General: Skin is warm.      Capillary Refill: Capillary refill takes less than 2 seconds.      Comments: Approximate 10 cm x 10 cm asymmetric bordered region of cellulitis on the left antecubital fossa consistent with site of bee sting.  Area is warm and tender no obvious abscess formation   Neurological:      Mental Status: She is alert and oriented to person, place, and time.      Motor: No abnormal muscle tone.      Coordination: Coordination normal.   Psychiatric:         Behavior: Behavior normal.         Thought Content: Thought content normal.         "

## 2024-10-28 NOTE — TELEPHONE ENCOUNTER
"Reason for Disposition  • MILD-MODERATE diarrhea (e.g., 1-6 times / day more than normal)    Answer Assessment - Initial Assessment Questions  1. DIARRHEA SEVERITY: \"How bad is the diarrhea?\" \"How many extra stools have you had in the past 24 hours than normal?\"     - NO DIARRHEA (SCALE 0)    - MILD (SCALE 1-3): Few loose or mushy BMs; increase of 1-3 stools over normal daily number of stools; mild increase in ostomy output.    -  MODERATE (SCALE 4-7): Increase of 4-6 stools daily over normal; moderate increase in ostomy output.  * SEVERE (SCALE 8-10; OR 'WORST POSSIBLE'): Increase of 7 or more stools daily over normal; moderate increase in ostomy output; incontinence.      On and off Diarrhea for over a week now. A lot of gas and severe diarrhea last night but now stopped.   2. ONSET: \"When did the diarrhea begin?\"       Over a week now.   3. BM CONSISTENCY: \"How loose or watery is the diarrhea?\"       Watery stool   4. VOMITING: \"Are you also vomiting?\" If Yes, ask: \"How many times in the past 24 hours?\"       Denies.   5. ABDOMINAL PAIN: \"Are you having any abdominal pain?\" If Yes, ask: \"What does it feel like?\" (e.g., crampy, dull, intermittent, constant)       Yes, abdominal pain all last night but not now. Upper abdominal pain   6. ABDOMINAL PAIN SEVERITY: If present, ask: \"How bad is the pain?\"  (e.g., Scale 1-10; mild, moderate, or severe)    - MILD (1-3): doesn't interfere with normal activities, abdomen soft and not tender to touch     - MODERATE (4-7): interferes with normal activities or awakens from sleep, tender to touch     - SEVERE (8-10): excruciating pain, doubled over, unable to do any normal activities        Moderate pain intermittent   7. ORAL INTAKE: If vomiting, \"Have you been able to drink liquids?\" \"How much fluids have you had in the past 24 hours?\"      Yes, drinking plenty of fluids   8. HYDRATION: \"Any signs of dehydration?\" (e.g., dry mouth [not just dry lips], too weak to stand, " "dizziness, new weight loss) \"When did you last urinate?\"      Denies, Urinated last night? Per pt   9. EXPOSURE: \"Have you traveled to a foreign country recently?\" \"Have you been exposed to anyone with diarrhea?\" \"Could you have eaten any food that was spoiled?\"      Denies.   10. ANTIBIOTIC USE: \"Are you taking antibiotics now or have you taken antibiotics in the past 2 months?\"        Denies.   11. OTHER SYMPTOMS: \"Do you have any other symptoms?\" (e.g., fever, blood in stool)        Denies.    Protocols used: Diarrhea-ADULT-[place]    " [facility]

## 2024-12-10 ENCOUNTER — TELEPHONE (OUTPATIENT)
Dept: GASTROENTEROLOGY | Facility: CLINIC | Age: 59
End: 2024-12-10

## 2024-12-10 DIAGNOSIS — K63.8219 SMALL INTESTINAL BACTERIAL OVERGROWTH (SIBO): ICD-10-CM

## 2024-12-10 DIAGNOSIS — K58.0 IRRITABLE BOWEL SYNDROME WITH DIARRHEA: Primary | ICD-10-CM

## 2024-12-10 DIAGNOSIS — R19.7 DIARRHEA, UNSPECIFIED TYPE: Primary | ICD-10-CM

## 2024-12-10 RX ORDER — COLESTIPOL HYDROCHLORIDE 1 G/1
1 TABLET ORAL 2 TIMES DAILY
Qty: 60 TABLET | Refills: 2 | Status: SHIPPED | OUTPATIENT
Start: 2024-12-10

## 2024-12-10 RX ORDER — DICYCLOMINE HYDROCHLORIDE 10 MG/1
10 CAPSULE ORAL
Qty: 75 CAPSULE | Refills: 2 | Status: SHIPPED | OUTPATIENT
Start: 2024-12-10 | End: 2024-12-10

## 2024-12-10 RX ORDER — DICYCLOMINE HCL 20 MG
20 TABLET ORAL 3 TIMES DAILY
Qty: 90 TABLET | Refills: 2 | Status: SHIPPED | OUTPATIENT
Start: 2024-12-10

## 2024-12-10 NOTE — TELEPHONE ENCOUNTER
I understand she has been taking the dicyclomine and on it's own is not helping, but that is exactly why I want repeat stool studies and CT scan so please reiterate that without those having another appointment this week is not going to be helpful without new information.     I would like her to start the Colestipol as discussed.   ACTUALLY, I would like her to increase the Dicyclomine to 20 mg or 2 pills of the 10 mg, 3 x daily to see if that is helpful. I did send a script for 20 mg pills to the pharmacy with refills.     IF, she is still having loose stools she can use the lomotil as well.     Her next OV has to be with a physician as I need a second opinion.     If her symptoms worsen or become uncontrollable, then she may need to go to the ER for more immediate evaluation.

## 2024-12-10 NOTE — TELEPHONE ENCOUNTER
Patient called and requested a call back regarding message below she has some questions regarding medications please review and reach out thank you

## 2024-12-10 NOTE — TELEPHONE ENCOUNTER
Can you please call the patient and let her know that I have been talking with Dr. Hill and I do want her to do the stool studies as we need to make sure there is not anything infectious especially since she just completed another round of the Xifaxan and is still having diarrhea.    In the meantime I am going to start her on Colestipol 1 pill 2 times daily with food to try to help slow down the diarrhea.    I also would like her to take the dicyclomine 10 mg 4 times a day and see how she does.  Hopefully over the next few days she will see improvement in her diarrhea and loose stools.    I actually also would like her to schedule a follow-up appointment with Dr. Nunn or Dr. Ambriz for a second opinion and any other treatment plan options.  I will have the office staff reach out to her and see if we can get her scheduled in the [place] office with Dr. Nunn or Dr. Ambriz sooner than later if possible.   Continue PPI therapy

## 2024-12-10 NOTE — TELEPHONE ENCOUNTER
Spoke with patient and relayed message. Attempted to schedule patient for 1/8 in  with Dr. Nunn but patient declined. Will keep watch on Dr. Nunn's schedule for Thursday in [place] as patient is willing to go there.

## 2024-12-12 ENCOUNTER — APPOINTMENT (OUTPATIENT)
Age: 59
End: 2024-12-12
Payer: COMMERCIAL

## 2024-12-12 ENCOUNTER — OFFICE VISIT (OUTPATIENT)
Dept: GASTROENTEROLOGY | Facility: CLINIC | Age: 59
End: 2024-12-12
Payer: COMMERCIAL

## 2024-12-12 VITALS
HEIGHT: 67 IN | DIASTOLIC BLOOD PRESSURE: 84 MMHG | OXYGEN SATURATION: 98 % | BODY MASS INDEX: 35.47 KG/M2 | HEART RATE: 66 BPM | SYSTOLIC BLOOD PRESSURE: 137 MMHG | WEIGHT: 226 LBS | TEMPERATURE: 97.6 F

## 2024-12-12 DIAGNOSIS — K58.0 IRRITABLE BOWEL SYNDROME WITH DIARRHEA: ICD-10-CM

## 2024-12-12 DIAGNOSIS — K21.9 GASTROESOPHAGEAL REFLUX DISEASE WITHOUT ESOPHAGITIS: ICD-10-CM

## 2024-12-12 DIAGNOSIS — R19.7 DIARRHEA, UNSPECIFIED TYPE: ICD-10-CM

## 2024-12-12 DIAGNOSIS — R10.13 EPIGASTRIC PAIN: Primary | ICD-10-CM

## 2024-12-12 PROCEDURE — 82705 FATS/LIPIDS FECES QUAL: CPT

## 2024-12-12 PROCEDURE — 87177 OVA AND PARASITES SMEARS: CPT

## 2024-12-12 PROCEDURE — 99214 OFFICE O/P EST MOD 30 MIN: CPT | Performed by: STUDENT IN AN ORGANIZED HEALTH CARE EDUCATION/TRAINING PROGRAM

## 2024-12-12 PROCEDURE — 82653 EL-1 FECAL QUANTITATIVE: CPT

## 2024-12-12 PROCEDURE — 87209 SMEAR COMPLEX STAIN: CPT

## 2024-12-12 PROCEDURE — 83993 ASSAY FOR CALPROTECTIN FECAL: CPT

## 2024-12-12 PROCEDURE — 87505 NFCT AGENT DETECTION GI: CPT

## 2024-12-12 NOTE — PATIENT INSTRUCTIONS
"Hold Ozempic for 4 weeks  Continue omeprazole 2 times daily  You may take the Pepcid AS NEEDED for any breakthrough symptoms  Pepcid is most effective before bedtime    Changes to your diet:  Maintaining adequate nutrition is the most important goal in the treatment of gastroparesis. Many people can manage gastroparesis with diet changes and dietary changes are the first step in managing this condition. Your doctor may refer you to a dietitian who can work with you to find foods that are easier for you to digest so that you're more likely to get enough calories and nutrients from the food you eat. A dietitian might suggest that you try to:  Eat smaller meals more frequently   Chew food thoroughly   Eat well-cooked fruits and vegetables rather than raw fruits and vegetables   Avoid fibrous fruits and vegetables, such as oranges and broccoli, which may cause bezoars   Choose mostly low-fat foods, but if you can tolerate them, add small servings of fatty foods to your diet   Try soups and pureed foods if liquids are easier for you to swallow   Drink about 34 to 51 ounces (1 to 1.5 liters) of water a day   Exercise gently after you eat, such as going for a walk   Avoid carbonated drinks, alcohol and smoking   Try to avoid lying down for 2 hours after a meal   Take a multivitamin daily    Here's a brief list of foods recommended for people with gastroparesis (your dietitian can give you a more comprehensive list):  Starches:  White bread and rolls and \"light\" whole-wheat bread without nuts or seeds   Plain or egg bagels   English muffins   Flour or corn tortillas   Pancakes   Puffed wheat and rice cereals   Cream of wheat or rice   White crackers   Potatoes, white or sweet (no skin)   Baked french fries   Rice   Pasta  Protein:  Lean beef, veal and pork (not fried)   Chicken or turkey (no skin and not fried)   Crab, lobster, shrimp, clams, scallops, oysters   Tuna (packed in water)   Cottage cheese   Eggs   Tofu "   Strained meat baby food  Fruits and vegetables:  Baby food vegetables and fruits   Tomato sauce, paste, puree, juice   Carrots (cooked)   Beets (cooked)   Mushrooms (cooked)   Vegetable juice   Vegetable broth   Fruit juices and drinks   Applesauce   Bananas   Peaches and pears (canned)  Dairy:  Milk, if tolerated   Yogurt (without fruit pieces)   Custard and pudding   Frozen yogurt

## 2024-12-13 ENCOUNTER — HOSPITAL ENCOUNTER (OUTPATIENT)
Dept: ULTRASOUND IMAGING | Facility: HOSPITAL | Age: 59
End: 2024-12-13
Attending: STUDENT IN AN ORGANIZED HEALTH CARE EDUCATION/TRAINING PROGRAM
Payer: COMMERCIAL

## 2024-12-13 DIAGNOSIS — R10.13 EPIGASTRIC PAIN: ICD-10-CM

## 2024-12-13 LAB
C COLI+JEJUNI TUF STL QL NAA+PROBE: NEGATIVE
EC STX1+STX2 GENES STL QL NAA+PROBE: NEGATIVE
SALMONELLA SP SPAO STL QL NAA+PROBE: NEGATIVE
SHIGELLA SP+EIEC IPAH STL QL NAA+PROBE: NEGATIVE

## 2024-12-13 PROCEDURE — 76705 ECHO EXAM OF ABDOMEN: CPT

## 2024-12-13 NOTE — ASSESSMENT & PLAN NOTE
Likely multifactorial.  She has been treated for SIBO in the past including recently.  She is at increased risk for SIBO due to significant acid suppression regimen.  There could also be GLP-1 agonist contributing to her IBS symptoms.  Possible postinfectious irritable bowel versus seasonal exacerbation as her symptoms tend to worsen in the fall and winter.    Management as noted above  We will hold GLP-1 agonist for 4 weeks  We will hold off on retreating SIBO for now but can consider a treatment trial with repeat rifaximin or doxycycline if needed  If her symptoms persist, may be reasonable to repeat colonoscopy as her last one was in 2021 and given the severity of her symptoms  Follow-up stool studies which have been ordered by my colleague and are currently in process

## 2024-12-13 NOTE — ASSESSMENT & PLAN NOTE
Likely exacerbated by gastroparesis secondary to GLP-1 agonist.  Recent EGD findings noted including food remaining in the stomach.    Continue omeprazole 40 mg 2 times daily  She will stop Pepcid scheduled but I have informed her that she may take this as needed and generally at bedtime when Pepcid tends to be most effective  Recommend gastroparesis diet

## 2024-12-13 NOTE — ASSESSMENT & PLAN NOTE
I suspect that the cause for her pain is multifactorial.  I do believe that a significant contributor is GLP-1 agonist.  Her recent endoscopy was notable for food remaining in the stomach which would be consistent with gastroparesis likely as a side effect of semaglutide.  This could also worsen reflux which she remains on PPI and Pepcid for.  Being on significant acid suppression regimen could increase her risk for recurrent SIBO as well which could be exacerbating her symptoms.  Lower suspicion for peptic ulcer disease.  Other potential etiology is biliary disease.  She did have cholelithiasis noted on prior imaging.    I have recommended that she hold her semaglutide for 4 weeks to see how her symptomatology responds  This will also help us elucidate which symptoms may be secondary to this medication and other potential cause  We will hold off on retreating her for SIBO for now  I have advised the patient to stop taking Pepcid and to only take this as needed  I did explain that Pepcid is most effective before bedtime  She will continue with omeprazole 2 times daily  We will also check right upper quadrant ultrasound to assess for any biliary pathology  If the after mentioned management is ineffective, may be reasonable for the patient to be evaluated by general surgery for empiric cholecystectomy    Orders:    US right upper quadrant; Future

## 2024-12-13 NOTE — PROGRESS NOTES
Name: Sara Harrell      : 1965      MRN: [id number]  Encounter Provider: Dick Nunn DO  Encounter Date: 2024   Encounter department: [facility] [place]  :  Assessment & Plan  Epigastric pain  I suspect that the cause for her pain is multifactorial.  I do believe that a significant contributor is GLP-1 agonist.  Her recent endoscopy was notable for food remaining in the stomach which would be consistent with gastroparesis likely as a side effect of semaglutide.  This could also worsen reflux which she remains on PPI and Pepcid for.  Being on significant acid suppression regimen could increase her risk for recurrent SIBO as well which could be exacerbating her symptoms.  Lower suspicion for peptic ulcer disease.  Other potential etiology is biliary disease.  She did have cholelithiasis noted on prior imaging.    I have recommended that she hold her semaglutide for 4 weeks to see how her symptomatology responds  This will also help us elucidate which symptoms may be secondary to this medication and other potential cause  We will hold off on retreating her for SIBO for now  I have advised the patient to stop taking Pepcid and to only take this as needed  I did explain that Pepcid is most effective before bedtime  She will continue with omeprazole 2 times daily  We will also check right upper quadrant ultrasound to assess for any biliary pathology  If the after mentioned management is ineffective, may be reasonable for the patient to be evaluated by general surgery for empiric cholecystectomy    Orders:    US right upper quadrant; Future    Irritable bowel syndrome with diarrhea  Likely multifactorial.  She has been treated for SIBO in the past including recently.  She is at increased risk for SIBO due to significant acid suppression regimen.  There could also be GLP-1 agonist contributing to her IBS symptoms.  Possible postinfectious irritable bowel versus seasonal  exacerbation as her symptoms tend to worsen in the fall and winter.    Management as noted above  We will hold GLP-1 agonist for 4 weeks  We will hold off on retreating SIBO for now but can consider a treatment trial with repeat rifaximin or doxycycline if needed  If her symptoms persist, may be reasonable to repeat colonoscopy as her last one was in 2021 and given the severity of her symptoms  Follow-up stool studies which have been ordered by my colleague and are currently in process         Gastroesophageal reflux disease without esophagitis  Likely exacerbated by gastroparesis secondary to GLP-1 agonist.  Recent EGD findings noted including food remaining in the stomach.    Continue omeprazole 40 mg 2 times daily  She will stop Pepcid scheduled but I have informed her that she may take this as needed and generally at bedtime when Pepcid tends to be most effective  Recommend gastroparesis diet             History of Present Illness   59-year-old female presents to GI office for evaluation of epigastric abdominal pain, worsening reflux, diarrhea with history of SIBO.      Sara Harrell is a 59 y.o. female who presents to GI office for follow-up of multiple GI symptoms.  Patient was previously seen by my colleague for complaints of IBS with diarrhea, SIBO, abdominal pain, and reflux.  She has been treated twice for SIBO which was confirmed based on hydrogen breath test.  She was treated with a course of doxycycline as well as a course of rifaximin more recently.  She did have improvement of her symptoms after both treatment courses although relief did not last for very long.  She reports that over the last couple of months, she has had worsening abdominal pain and was having significant diarrhea.  Fortunately, over the last couple of days, her symptoms do appear to be improving.  She does have reflux and states that this can often be severe.  She also has epigastric discomfort and fullness often.  This does  affect her appetite.  She reports that she has been drinking Gatorade and eating a rather small and bland diet.  Fortunately, her weight remains stable.  She does continue to be on semaglutide.    History obtained from: patient    Review of Systems   Constitutional:  Negative for appetite change.   HENT:  Negative for trouble swallowing.    Respiratory:  Negative for shortness of breath.    Cardiovascular:  Negative for chest pain and palpitations.   Gastrointestinal:  Positive for abdominal pain and diarrhea. Negative for abdominal distention, blood in stool, constipation, nausea, rectal pain and vomiting.        +reflux   All other systems reviewed and are negative.    Medical History Reviewed by provider this encounter:     .  Current Outpatient Medications on File Prior to Visit   Medication Sig Dispense Refill    cetirizine (ZyrTEC) 10 mg tablet Take 10 mg by mouth      clotrimazole-betamethasone (LOTRISONE) 1-0.05 % cream Apply topically 2 (two) times a day      colestipol (COLESTID) 1 g tablet Take 1 tablet (1 g total) by mouth 2 (two) times a day 60 tablet 2    dicyclomine (BENTYL) 20 mg tablet Take 1 tablet (20 mg total) by mouth 3 (three) times a day 90 tablet 2    diphenoxylate-atropine (LOMOTIL) 2.5-0.025 mg per tablet Take 1 tablet by mouth 4 (four) times a day as needed for diarrhea 45 tablet 1    famotidine (PEPCID) 20 mg tablet Take 1 tablet (20 mg total) by mouth 2 (two) times a day 60 tablet 3    omeprazole (PriLOSEC) 40 MG capsule Take 1 capsule (40 mg total) by mouth daily Take 1 PO QD in AM prior to a meal. 90 capsule 1    semaglutide, 2 mg/dose, (Ozempic) 8 mg/ mL injection pen Inject 0.75 mL (2 mg total) under the skin every 7 days 9 mL 1    ondansetron (ZOFRAN) 4 mg tablet Take 1 tablet (4 mg total) by mouth every 8 (eight) hours as needed for nausea or vomiting (Patient not taking: Reported on 10/1/2024) 20 tablet 1     No current facility-administered medications on file prior to visit.     "  Social History     Tobacco Use    Smoking status: Never    Smokeless tobacco: Never   Vaping Use    Vaping status: Never Used   Substance and Sexual Activity    Alcohol use: Yes     Alcohol/week: 4.0 standard drinks of alcohol     Types: 2 Glasses of wine, 2 Shots of liquor per week     Comment: occasional    Drug use: No    Sexual activity: Yes     Partners: Male     Birth control/protection: Female Sterilization        Objective   /84 (BP Location: Right arm, Patient Position: Sitting, Cuff Size: Standard)   Pulse 66   Temp 97.6 °F (36.4 °C) (Temporal)   Ht 5' 7\" (1.702 m)   Wt 103 kg (226 lb)   LMP  (LMP Unknown)   SpO2 98%   BMI 35.40 kg/m²      Physical Exam  Vitals reviewed.   Constitutional:       Appearance: Normal appearance.   HENT:      Head: Normocephalic and atraumatic.      Nose: Nose normal.   Eyes:      Extraocular Movements: Extraocular movements intact.   Cardiovascular:      Rate and Rhythm: Normal rate and regular rhythm.   Pulmonary:      Effort: Pulmonary effort is normal. No respiratory distress.   Abdominal:      General: Abdomen is flat. Bowel sounds are normal. There is no distension.      Palpations: Abdomen is soft. There is no mass.      Tenderness: There is no abdominal tenderness. There is no guarding.   Neurological:      General: No focal deficit present.      Mental Status: She is alert.   Psychiatric:         Mood and Affect: Mood normal.         Behavior: Behavior normal.         Administrative Statements   I have spent a total time of 20 minutes in caring for this patient on the day of the visit/encounter including Diagnostic results, Prognosis, Risks and benefits of tx options, Instructions for management, Patient and family education, Importance of tx compliance, Risk factor reductions, Impressions, Documenting in the medical record, Reviewing / ordering tests, medicine, procedures  , and Obtaining or reviewing history  . Topics discussed with the patient / " family include symptom assessment and management, medication review, medication adjustment, and supportive listening.

## 2024-12-15 LAB — CALPROTECTIN STL-MCNC: 506 ÂΜG/G

## 2024-12-16 LAB — ELASTASE PANC STL-MCNT: >800 UG/G

## 2024-12-17 LAB
FAT STL QL: NORMAL
NEUTRAL FAT STL QL: NORMAL

## 2024-12-19 ENCOUNTER — TELEPHONE (OUTPATIENT)
Dept: OTHER | Facility: OTHER | Age: 59
End: 2024-12-19

## 2024-12-19 ENCOUNTER — PREP FOR PROCEDURE (OUTPATIENT)
Age: 59
End: 2024-12-19

## 2024-12-19 ENCOUNTER — RESULTS FOLLOW-UP (OUTPATIENT)
Age: 59
End: 2024-12-19

## 2024-12-19 DIAGNOSIS — K29.00 ACUTE GASTRITIS WITHOUT HEMORRHAGE, UNSPECIFIED GASTRITIS TYPE: ICD-10-CM

## 2024-12-19 DIAGNOSIS — K63.8219 SMALL INTESTINAL BACTERIAL OVERGROWTH (SIBO): ICD-10-CM

## 2024-12-19 DIAGNOSIS — K58.0 IRRITABLE BOWEL SYNDROME WITH DIARRHEA: Primary | ICD-10-CM

## 2024-12-19 DIAGNOSIS — R10.13 EPIGASTRIC PAIN: ICD-10-CM

## 2024-12-19 DIAGNOSIS — R11.0 NAUSEA: ICD-10-CM

## 2024-12-19 DIAGNOSIS — K21.9 GASTROESOPHAGEAL REFLUX DISEASE WITHOUT ESOPHAGITIS: ICD-10-CM

## 2024-12-19 DIAGNOSIS — K80.20 CALCULUS OF GALLBLADDER WITHOUT CHOLECYSTITIS WITHOUT OBSTRUCTION: Primary | ICD-10-CM

## 2024-12-19 RX ORDER — POLYETHYLENE GLYCOL 3350, SODIUM SULFATE ANHYDROUS, SODIUM BICARBONATE, SODIUM CHLORIDE, POTASSIUM CHLORIDE 236; 22.74; 6.74; 5.86; 2.97 G/4L; G/4L; G/4L; G/4L; G/4L
4 POWDER, FOR SOLUTION ORAL ONCE
Qty: 4000 ML | Refills: 0 | Status: SHIPPED | OUTPATIENT
Start: 2024-12-19 | End: 2024-12-19

## 2024-12-19 RX ORDER — SODIUM CHLORIDE, SODIUM LACTATE, POTASSIUM CHLORIDE, CALCIUM CHLORIDE 600; 310; 30; 20 MG/100ML; MG/100ML; MG/100ML; MG/100ML
125 INJECTION, SOLUTION INTRAVENOUS CONTINUOUS
OUTPATIENT
Start: 2024-12-19

## 2024-12-19 NOTE — TELEPHONE ENCOUNTER
I called the patient back as per her request and she let me know that for the past week since she saw Dr. Nunn she has stayed off the Ozempic and has been taking the dicyclomine 20 mg, 2 pills twice a day, colestipol 1 pill twice a day, omeprazole 40 mg twice a day, famotidine 20 mg at bedtime.  She reports that she feels that every other day she is okay and then on the day she is not she is up all night with loose stools and abdominal pain and cramping, especially if she deviates from eating bland foods like toast, crackers, soup, etc.    The patient did proceed with the right upper quadrant ultrasound on December 13, 2024, however, the ultrasound has not been read.  I did discuss with the patient that it is definitely a possibility that there is some underlying gallbladder issues and depending on the ultrasound we may need to consider a HIDA scan as her gallbladder could definitely be an underlying component, but most likely not the complete cause.  I advised the patient that we would do our best to get the ultrasound read sooner than later.  The patient was agreeable and verbalized an understanding.    The patient reports that she is very frustrated as she is not convinced her Ozempic is the problem as she had this issue before the Ozempic, but does understand that Ozempic could be adding to the problem.  I did review with her that even if the Ozempic is not the exact underlying cause, it is definitely adding to her issue as the whole promises of how Ozempic works as it slows down the GI tract to keep us feeling full also are less likely to eat, basically causing gastroparesis which could be her symptoms.  At this point I encouraged the patient to stay off the Ozempic as recommended by Dr. Nunn for the full 4 weeks.  The patient was agreeable and verbalized an understanding.    The patient also feels that taking all these medications could also be adding to her issues even though her symptoms are pretty much the  same as it was before she started the medication.    I discussed with the patient that at this point time I would like her to proceed with the following plan:    Proceed with colonoscopy and repeat EGD in 4 weeks once the patient has been off of the Ozempic for 4 weeks so that way we can get a better idea of possible underlying etiology.  I advised patient someone from our staff will reach out to her to get that scheduled.    Decrease colestipol to 1 pill daily at dinner.  Decrease dicyclomine to 20 mg, 1 p.o. 3 times daily.  Continue famotidine at bedtime.  Continue omeprazole twice a day.  Continue to hold Ozempic.  Continue to stay hydrated.  Keep follow-up office visit in January as scheduled.    I discussed with the patient that I could understand her frustration, and that we are doing her best to try to continue to evaluate the possible underlying etiology, but did explain to her that if her issues are truly from irritable bowel syndrome, this is sometimes how it is that there are periods of flareups and that overall we do our best to manage it with a goal of 50% improvement, 50% of the time as sometimes that is the best we can do.  The patient verbalized that she did not like those statistics and I explained that I understand, but I was trying to be realistic with her and give her realistic expectations.

## 2024-12-19 NOTE — TELEPHONE ENCOUNTER
Can you please call the patient and schedule her for a colonoscopy and the EGD at [place] or [facility] in 4 weeks or more? It should not be any time sooner because we need to give the Ozempic time to get out of her system.     Then schedule her a f/u OV with Dr Nunn after the procedures.     Keep her f/u OV with me in early January.

## 2025-01-06 ENCOUNTER — TELEPHONE (OUTPATIENT)
Age: 60
End: 2025-01-06

## 2025-01-06 DIAGNOSIS — Z71.3 ENCOUNTER FOR WEIGHT LOSS COUNSELING: ICD-10-CM

## 2025-01-06 NOTE — TELEPHONE ENCOUNTER
Pt called stated she can not use golytely and she used suprep would like suprep called in but they still called in golytely. Warm transferred over to triage nurse for further assistance

## 2025-01-06 NOTE — TELEPHONE ENCOUNTER
EGD Colonoscopy 1/17/25    Pt requesting Suprep be sent. States sister and mother became violently ill with Golytely.     If provider is agreeable to Suprep, instructions will need to be sent via [employer].

## 2025-01-07 ENCOUNTER — PREP FOR PROCEDURE (OUTPATIENT)
Age: 60
End: 2025-01-07

## 2025-01-07 DIAGNOSIS — K58.0 IRRITABLE BOWEL SYNDROME WITH DIARRHEA: ICD-10-CM

## 2025-01-07 DIAGNOSIS — Z80.0 FAMILY HISTORY OF COLON CANCER IN MOTHER: Primary | ICD-10-CM

## 2025-01-07 RX ORDER — SODIUM, POTASSIUM,MAG SULFATES 17.5-3.13G
177 SOLUTION, RECONSTITUTED, ORAL ORAL ONCE
Qty: 177 ML | Refills: 0 | Status: SHIPPED | OUTPATIENT
Start: 2025-01-07 | End: 2025-01-17 | Stop reason: HOSPADM

## 2025-01-07 RX ORDER — SODIUM CHLORIDE, SODIUM LACTATE, POTASSIUM CHLORIDE, CALCIUM CHLORIDE 600; 310; 30; 20 MG/100ML; MG/100ML; MG/100ML; MG/100ML
125 INJECTION, SOLUTION INTRAVENOUS CONTINUOUS
Status: CANCELLED | OUTPATIENT
Start: 2025-01-07

## 2025-01-08 RX ORDER — SEMAGLUTIDE 2.68 MG/ML
INJECTION, SOLUTION SUBCUTANEOUS
Qty: 3 ML | Refills: 1 | Status: SHIPPED | OUTPATIENT
Start: 2025-01-08

## 2025-01-17 ENCOUNTER — ANESTHESIA EVENT (OUTPATIENT)
Dept: GASTROENTEROLOGY | Facility: HOSPITAL | Age: 60
End: 2025-01-17
Payer: COMMERCIAL

## 2025-01-17 ENCOUNTER — ANESTHESIA (OUTPATIENT)
Dept: GASTROENTEROLOGY | Facility: HOSPITAL | Age: 60
End: 2025-01-17
Payer: COMMERCIAL

## 2025-01-17 ENCOUNTER — HOSPITAL ENCOUNTER (OUTPATIENT)
Dept: GASTROENTEROLOGY | Facility: HOSPITAL | Age: 60
Setting detail: OUTPATIENT SURGERY
End: 2025-01-17
Payer: COMMERCIAL

## 2025-01-17 VITALS
HEART RATE: 72 BPM | DIASTOLIC BLOOD PRESSURE: 74 MMHG | BODY MASS INDEX: 35.47 KG/M2 | RESPIRATION RATE: 18 BRPM | HEIGHT: 67 IN | SYSTOLIC BLOOD PRESSURE: 118 MMHG | TEMPERATURE: 97 F | OXYGEN SATURATION: 97 % | WEIGHT: 226 LBS

## 2025-01-17 DIAGNOSIS — K21.9 GASTROESOPHAGEAL REFLUX DISEASE WITHOUT ESOPHAGITIS: ICD-10-CM

## 2025-01-17 DIAGNOSIS — R10.13 EPIGASTRIC PAIN: ICD-10-CM

## 2025-01-17 DIAGNOSIS — K58.0 IRRITABLE BOWEL SYNDROME WITH DIARRHEA: ICD-10-CM

## 2025-01-17 DIAGNOSIS — R11.0 NAUSEA: ICD-10-CM

## 2025-01-17 DIAGNOSIS — K63.8219 SMALL INTESTINAL BACTERIAL OVERGROWTH (SIBO): ICD-10-CM

## 2025-01-17 DIAGNOSIS — K29.00 ACUTE GASTRITIS WITHOUT HEMORRHAGE, UNSPECIFIED GASTRITIS TYPE: ICD-10-CM

## 2025-01-17 PROCEDURE — 45380 COLONOSCOPY AND BIOPSY: CPT | Performed by: STUDENT IN AN ORGANIZED HEALTH CARE EDUCATION/TRAINING PROGRAM

## 2025-01-17 PROCEDURE — 43239 EGD BIOPSY SINGLE/MULTIPLE: CPT | Performed by: STUDENT IN AN ORGANIZED HEALTH CARE EDUCATION/TRAINING PROGRAM

## 2025-01-17 PROCEDURE — 88305 TISSUE EXAM BY PATHOLOGIST: CPT | Performed by: PATHOLOGY

## 2025-01-17 PROCEDURE — 88342 IMHCHEM/IMCYTCHM 1ST ANTB: CPT | Performed by: PATHOLOGY

## 2025-01-17 RX ORDER — PROPOFOL 10 MG/ML
INJECTION, EMULSION INTRAVENOUS CONTINUOUS PRN
Status: DISCONTINUED | OUTPATIENT
Start: 2025-01-17 | End: 2025-01-17

## 2025-01-17 RX ORDER — PROPOFOL 10 MG/ML
INJECTION, EMULSION INTRAVENOUS AS NEEDED
Status: DISCONTINUED | OUTPATIENT
Start: 2025-01-17 | End: 2025-01-17

## 2025-01-17 RX ORDER — SODIUM CHLORIDE, SODIUM LACTATE, POTASSIUM CHLORIDE, CALCIUM CHLORIDE 600; 310; 30; 20 MG/100ML; MG/100ML; MG/100ML; MG/100ML
125 INJECTION, SOLUTION INTRAVENOUS CONTINUOUS
Status: DISCONTINUED | OUTPATIENT
Start: 2025-01-17 | End: 2025-01-21 | Stop reason: HOSPADM

## 2025-01-17 RX ORDER — LIDOCAINE HYDROCHLORIDE 20 MG/ML
INJECTION, SOLUTION EPIDURAL; INFILTRATION; INTRACAUDAL; PERINEURAL AS NEEDED
Status: DISCONTINUED | OUTPATIENT
Start: 2025-01-17 | End: 2025-01-17

## 2025-01-17 RX ADMIN — PROPOFOL 100 MG: 10 INJECTION, EMULSION INTRAVENOUS at 10:07

## 2025-01-17 RX ADMIN — LIDOCAINE HYDROCHLORIDE 100 MG: 20 INJECTION, SOLUTION EPIDURAL; INFILTRATION; INTRACAUDAL at 10:02

## 2025-01-17 RX ADMIN — SODIUM CHLORIDE, SODIUM LACTATE, POTASSIUM CHLORIDE, AND CALCIUM CHLORIDE 125 ML/HR: .6; .31; .03; .02 INJECTION, SOLUTION INTRAVENOUS at 09:33

## 2025-01-17 RX ADMIN — PROPOFOL 40 MG: 10 INJECTION, EMULSION INTRAVENOUS at 10:17

## 2025-01-17 RX ADMIN — PROPOFOL 150 MG: 10 INJECTION, EMULSION INTRAVENOUS at 10:02

## 2025-01-17 RX ADMIN — PROPOFOL 120 MCG/KG/MIN: 10 INJECTION, EMULSION INTRAVENOUS at 10:07

## 2025-01-17 NOTE — H&P
[facility]  History & Physical     PATIENT INFO     Name: Sara Harrell  YOB: 1965   Age: 59 y.o.   Sex: female   MRN: [id number]     HISTORY OF PRESENT ILLNESS     Sara Harrell is a 59 y.o. year old female who presents for EGD and colonoscopy for GERD, epigastric pain, diarrhea.  Last colonoscopy in .  No antithrombotics or anticoagulants.     REVIEW OF SYSTEMS     Per the HPI, and otherwise unremarkable.    Historical Information   Past Medical History:   Diagnosis Date    Allergic     Asthma due to seasonal allergies     GERD (gastroesophageal reflux disease)     Kidney stone     Pneumonia     Urinary tract infection      Past Surgical History:   Procedure Laterality Date    APPENDECTOMY  10/1982     SECTION      two-  &     HYSTERECTOMY      partial    JOINT REPLACEMENT      REPLACEMENT TOTAL KNEE BILATERAL      right- , left-      Social History   Social History     Substance and Sexual Activity   Alcohol Use Yes    Alcohol/week: 4.0 standard drinks of alcohol    Types: 2 Glasses of wine, 2 Shots of liquor per week    Comment: occasional     Social History     Substance and Sexual Activity   Drug Use No     Social History     Tobacco Use   Smoking Status Never   Smokeless Tobacco Never     Family History   Problem Relation Age of Onset    Heart disease Father     Hypertension Father     Urolithiasis Mother     Colon cancer Mother     Arthritis Mother     Cancer Mother         Colon cancer    Hearing loss Mother     Kidney cancer Brother     Urolithiasis Brother     Hepatitis Brother         hep c    Kidney disease Brother     Urolithiasis Sister     Diabetes Sister     Kidney disease Sister     Urolithiasis Daughter         MEDICATIONS & ALLERGIES     Current Outpatient Medications   Medication Instructions    cetirizine (ZYRTEC) 10 mg    clotrimazole-betamethasone (LOTRISONE) 1-0.05 % cream 2 times daily    colestipol  "(COLESTID) 1 g, Oral, 2 times daily    dicyclomine (BENTYL) 20 mg, Oral, 3 times daily    diphenoxylate-atropine (LOMOTIL) 2.5-0.025 mg per tablet 1 tablet, Oral, 4 times daily PRN    famotidine (PEPCID) 20 mg, Oral, 2 times daily    omeprazole (PRILOSEC) 40 mg, Oral, Daily, Take 1 PO QD in AM prior to a meal.    ondansetron (ZOFRAN) 4 mg, Oral, Every 8 hours PRN    semaglutide, 2 mg/dose, (Ozempic, 2 MG/DOSE,) 8 mg/ mL injection pen INJECT 0.75 ML (2 MG) SUBCUTANEOUSLY EVERY 7 DAYS     Allergies   Allergen Reactions    Prednisone Other (See Comments)     Headaches and blood pressure spikes      Sulfa Antibiotics Hives    Vicodin [Hydrocodone-Acetaminophen] Anxiety    Codeine Hives    Colistin      Other reaction(s): Other (See Comments)  colymycin otic    Other Itching     Other reaction(s): Other (See Comments)  burn        PHYSICAL EXAM      Objective   Blood pressure 141/91, pulse 67, temperature (!) 97 °F (36.1 °C), temperature source Temporal, resp. rate 20, height 5' 7\" (1.702 m), weight 103 kg (226 lb), SpO2 98%. Body mass index is 35.4 kg/m².    General Appearance:   Alert, cooperative, no distress   Lungs:   Equal chest rise, respirations unlabored    Heart:   Regular rate and rhythm   Abdomen:   Soft, non-tender, non-distended; normal bowel sounds; no masses, no organomegaly    Extremities:   No edema       ASSESSMENT & PLAN     This is a 59 y.o. year old female here for EGD and colonoscopy, and she is stable and optimized for her procedure.      Dick Nunn D.O.  [facility]  Division of Gastroenterology & Hepatology  Available on TigerText  [email or web address]    ** Please Note: This note is constructed using a voice recognition dictation system. **  "  89 y/o Cantonese speaking female, ambulates with walker, has HHA 5 days a week 6hrs a day with PMHx of HTN, pAtrial Fibrillation (on Eliquis 2.5mg),  HFrEF (EF 30-35%, previously EF 40%), severe MR, Medtronic PPM (s/p gen change 2022 @ [facility]), bioprosthetic AVR 2009 @ [facility], recent admission at [facility] (08/08/23-08/11/23) for acute decompensated HFrEF and afib RVR s/p failed DCCV, s/p digoxin and amiodarone initiation, presents to [facility] from outpatient cardiologist Dr. Eli Mosher office for LHC/RHC given systolic dysfunction (EF 30-35%) to understand the coronary anatomy and intra cardiac pressures while on medications that may decide next course of action in regards to severe MR management. Pt admits to symptoms of shortness of breath with limited exertion and can walk only ______. Denies chest pain, orthopnea, PND, palpitations, syncope, claudication, n/v, fevers, chills or any other symptoms. In light of risk factors, known systolic dysfunction, recent decompensated heart failure, pt presents for RHC/LHC.     Prior cardiac testing:  MISAEL (08/10/23): revealed EF 30-35% w/ global hypokinesis, dilated LA, reduced RVSF, severe MR, bioprosthetic valve noted in aortic position no AR, severe non-mobile plaque seen in descending aorta and aortic arch.  TTE (01/25/22): LV systolic function EF of 40%, severely dilated LA, mild LVH, global LV hypokinesis, grade II LV diastolic dysfunction, normal functioning mechanical prosthesis in aortic valve position (JERMAINE 2.1 cm^2, peak gradient 12 mm hg, mean gradient 7 mm hg, peak velocity 1.7 m/s), mod-severe MR, mild TR, linear echogenic density seen in right ventricle, c/w pacemaker wire.      Cardiologist: Dr. Jerome Lopez  Pharmacy: [facility], [id number] ([phone])  Escort: Daughter    89F Cantonese-speaking, ambulates with walker and has HHA 5 days a week 6hrs a day with PMHx of HTN, pAFib (on Eliquis, last dose 8/20/23),  HFrEF (now with EF 30-35% vs 40% on 1/23/22), severe MR, Medtronic PPM (s/p gen change 2022 @ [facility]), bioAVR (2009, [place]), and recent admission at [facility] (08/08/23-08/11/23) for acute decompensated HFrEF and Afib RVR s/p failed DCCV, s/p Digoxin and Amiodarone initiation, presents to [facility] for L/RHC for further evaluation and how best to manage her severe MR (i.e. medical management vs. MitraClip).     Since discharge from recent hospitalization, pt reports that she has been experiencing insomnia and SOB with mild exertion (max 2-3 ft); but otherwise has been recovering well at home and denies any fatigue, weakness, headache, dizziness/lightheadedness, CP, palpitations, orthopnea/PND, LE edema, N/V, abdominal discomfort, melena, BRBPR, hematemesis, or recent sick contacts/travel.     In light of pt's risk factors and CCS Class III anginal symptoms, pt is referred to [facility] for cardiac catheterization and revascularization if clinically indicated.    MISAEL (8/10/23): EF 30-35% w/ global hypokinesis, dilated LA, reduced RVSF. Bioprosthetic valve noted in aortic position - no AR, severe non-mobile plaque seen in descending aorta and aortic arch. Mildly thickened MV with noted prolapse of the anterior leaflet (A2 scallop) resulting in lack of coaptation; the mean transvalvular gradient is 3.00 mmHg at a heart rate of 99 bpm. The mitral valve area estimated via planimetry is 3.92 cm²; Severe MR    TTE (1/25/22): EF 40%, severely dilated LA, mild LVH, global LV hypokinesis, grade II LV diastolic dysfunction, normal functioning mechanical prosthesis in aortic valve position (JERMAINE 2.1 cm^2, peak gradient 12 mm hg, mean gradient 7 mm hg, peak velocity 1.7 m/s), mod-severe MR, mild TR, linear echogenic density seen in right ventricle, c/w pacemaker wire.

## 2025-01-17 NOTE — ANESTHESIA PREPROCEDURE EVALUATION
Procedure:  COLONOSCOPY  EGD    Relevant Problems   ENDO   (+) Subclinical hypothyroidism      GI/HEPATIC   (+) Esophageal reflux        Physical Exam    Airway    Mallampati score: III  TM Distance: >3 FB  Neck ROM: full     Dental   No notable dental hx     Cardiovascular      Pulmonary      Other Findings  post-pubertal.      Anesthesia Plan  ASA Score- 2     Anesthesia Type- IV sedation with anesthesia with ASA Monitors.         Additional Monitors:     Airway Plan:            Plan Factors-Exercise tolerance (METS): >4 METS.    Chart reviewed.    Patient summary reviewed.    Patient is not a current smoker.  Patient did not smoke on day of surgery.            Induction- intravenous.    Postoperative Plan-     Perioperative Resuscitation Plan - Level 1 - Full Code.       Informed Consent- Anesthetic plan and risks discussed with patient.  I personally reviewed this patient with the CRNA. Discussed and agreed on the Anesthesia Plan with the CRNA..      NPO Status:  No vitals data found for the desired time range.

## 2025-01-17 NOTE — ANESTHESIA POSTPROCEDURE EVALUATION
Post-Op Assessment Note    CV Status:  Stable  Pain Score: 0    Pain management: adequate       Mental Status:  Alert and awake   Hydration Status:  Euvolemic   PONV Controlled:  Controlled   Airway Patency:  Patent     Post Op Vitals Reviewed: Yes    No anethesia notable event occurred.    Staff: CRNA, Anesthesiologist           Last Filed PACU Vitals:  Vitals Value Taken Time   Temp     Pulse 75    /67    Resp 20    SpO2 98

## 2025-01-21 ENCOUNTER — TELEPHONE (OUTPATIENT)
Age: 60
End: 2025-01-21

## 2025-01-21 NOTE — TELEPHONE ENCOUNTER
Patient has appt on 2/3/25 would like to see you sooner about change from Ozempic to Wegovy  so if you could move up appt or call her [phone]

## 2025-01-23 PROCEDURE — 88305 TISSUE EXAM BY PATHOLOGIST: CPT | Performed by: PATHOLOGY

## 2025-01-23 PROCEDURE — 88342 IMHCHEM/IMCYTCHM 1ST ANTB: CPT | Performed by: PATHOLOGY

## 2025-01-27 ENCOUNTER — RA CDI HCC (OUTPATIENT)
Dept: OTHER | Facility: HOSPITAL | Age: 60
End: 2025-01-27

## 2025-01-28 ENCOUNTER — RESULTS FOLLOW-UP (OUTPATIENT)
Dept: GASTROENTEROLOGY | Facility: CLINIC | Age: 60
End: 2025-01-28

## 2025-02-03 ENCOUNTER — OFFICE VISIT (OUTPATIENT)
Dept: FAMILY MEDICINE CLINIC | Facility: CLINIC | Age: 60
End: 2025-02-03
Payer: COMMERCIAL

## 2025-02-03 VITALS
OXYGEN SATURATION: 98 % | SYSTOLIC BLOOD PRESSURE: 124 MMHG | TEMPERATURE: 97.4 F | HEIGHT: 66 IN | WEIGHT: 226.6 LBS | HEART RATE: 76 BPM | BODY MASS INDEX: 36.42 KG/M2 | DIASTOLIC BLOOD PRESSURE: 84 MMHG

## 2025-02-03 DIAGNOSIS — K58.0 IRRITABLE BOWEL SYNDROME WITH DIARRHEA: Primary | ICD-10-CM

## 2025-02-03 DIAGNOSIS — Z71.3 ENCOUNTER FOR WEIGHT LOSS COUNSELING: ICD-10-CM

## 2025-02-03 PROCEDURE — 99213 OFFICE O/P EST LOW 20 MIN: CPT | Performed by: STUDENT IN AN ORGANIZED HEALTH CARE EDUCATION/TRAINING PROGRAM

## 2025-02-03 RX ORDER — AMITRIPTYLINE HYDROCHLORIDE 10 MG/1
10 TABLET ORAL
Qty: 45 TABLET | Refills: 0 | Status: SHIPPED | OUTPATIENT
Start: 2025-02-03

## 2025-02-03 RX ORDER — TIRZEPATIDE 2.5 MG/.5ML
2.5 INJECTION, SOLUTION SUBCUTANEOUS WEEKLY
Qty: 2 ML | Refills: 0 | Status: SHIPPED | OUTPATIENT
Start: 2025-02-03 | End: 2025-02-06 | Stop reason: ALTCHOICE

## 2025-02-03 NOTE — ASSESSMENT & PLAN NOTE
Will trial elavil 10 mg for concerns of IBS   Patient will call in several weeks with update  Had EGD/Colon no acute findings   Was told she has gallstones and should see surgery      Orders:    amitriptyline (ELAVIL) 10 mg tablet; Take 1 tablet (10 mg total) by mouth daily at bedtime

## 2025-02-03 NOTE — PROGRESS NOTES
Name: Sara Harrell      : 1965      MRN: [id number]  Encounter Provider: Yomi Hill MD  Encounter Date: 2/3/2025   Encounter department: [facility]IMARY [facility]  :  Assessment & Plan  Irritable bowel syndrome with diarrhea  Will trial elavil 10 mg for concerns of IBS   Patient will call in several weeks with update  Had EGD/Colon no acute findings   Was told she has gallstones and should see surgery      Orders:    amitriptyline (ELAVIL) 10 mg tablet; Take 1 tablet (10 mg total) by mouth daily at bedtime    Encounter for weight loss counseling  Still taking ozempic 2mg daily  Unsure if this is contributing to her above symptoms, patient adament it is not  Patient would like to try alternative such as mounjaro as she's still having issues losing weigh t                    History of Present Illness   HPI      59 yof presents to office to discuss ongoing stomach issues       Review of Systems   Constitutional:  Negative for activity change, appetite change, chills, fatigue and fever.   HENT:  Negative for congestion, dental problem, drooling, ear discharge, ear pain, facial swelling, postnasal drip, rhinorrhea and sinus pain.    Eyes:  Negative for photophobia, pain, discharge and itching.   Respiratory:  Negative for apnea, cough, chest tightness and shortness of breath.    Cardiovascular:  Negative for chest pain and leg swelling.   Gastrointestinal:  Negative for abdominal distention, abdominal pain, anal bleeding, constipation, diarrhea and nausea.   Endocrine: Negative for cold intolerance, heat intolerance and polydipsia.   Genitourinary:  Negative for difficulty urinating.   Musculoskeletal:  Negative for arthralgias, gait problem, joint swelling and myalgias.   Skin:  Negative for color change and pallor.   Allergic/Immunologic: Negative for immunocompromised state.   Neurological:  Negative for dizziness, seizures, facial asymmetry, weakness, light-headedness, numbness and  "headaches.   Psychiatric/Behavioral:  Negative for agitation, behavioral problems, confusion, decreased concentration and dysphoric mood.    All other systems reviewed and are negative.      Objective   /84 (BP Location: Left arm, Patient Position: Sitting, Cuff Size: Large)   Pulse 76   Temp (!) 97.4 °F (36.3 °C) (Tympanic)   Ht 5' 5.5\" (1.664 m)   Wt 103 kg (226 lb 9.6 oz)   LMP  (LMP Unknown)   SpO2 98%   BMI 37.13 kg/m²      Physical Exam    "

## 2025-02-05 ENCOUNTER — TELEPHONE (OUTPATIENT)
Age: 60
End: 2025-02-05

## 2025-02-05 NOTE — TELEPHONE ENCOUNTER
Mounjaro will not be covered with or without a prior authorization, Mounjaro  is not FDA approved for obesity, prediabetes, etc. Mounjaro  is only FDA Approved for Type 2 Diabetes and will only be Approved via a Prior Authorization if patient has a diagnosis of Type 2 Diabetes.

## 2025-02-05 NOTE — TELEPHONE ENCOUNTER
Patient called back stating she called her insurance and was told that they do cover for both Weight loss and Diabetes for both Mounjaro and Ozempic. States they require prior auth to be called at [phone]. Patient wants the Mounjaro due to the ozempic has not been helping with weightloss.

## 2025-02-05 NOTE — TELEPHONE ENCOUNTER
Patient notified, wants to know if you have any other suggestions. States she has only one ozempic injection left and not sure what she is suppose to do now.

## 2025-02-10 ENCOUNTER — APPOINTMENT (OUTPATIENT)
Dept: LAB | Facility: HOSPITAL | Age: 60
End: 2025-02-10
Payer: COMMERCIAL

## 2025-02-10 ENCOUNTER — TELEPHONE (OUTPATIENT)
Age: 60
End: 2025-02-10

## 2025-02-10 ENCOUNTER — OFFICE VISIT (OUTPATIENT)
Dept: SURGERY | Facility: CLINIC | Age: 60
End: 2025-02-10
Payer: COMMERCIAL

## 2025-02-10 ENCOUNTER — OFFICE VISIT (OUTPATIENT)
Dept: LAB | Facility: HOSPITAL | Age: 60
End: 2025-02-10
Payer: COMMERCIAL

## 2025-02-10 VITALS
TEMPERATURE: 96.1 F | SYSTOLIC BLOOD PRESSURE: 124 MMHG | DIASTOLIC BLOOD PRESSURE: 76 MMHG | RESPIRATION RATE: 18 BRPM | HEART RATE: 70 BPM | OXYGEN SATURATION: 98 % | BODY MASS INDEX: 35.47 KG/M2 | WEIGHT: 226 LBS | HEIGHT: 67 IN

## 2025-02-10 DIAGNOSIS — K80.20 CALCULUS OF GALLBLADDER WITHOUT CHOLECYSTITIS WITHOUT OBSTRUCTION: ICD-10-CM

## 2025-02-10 DIAGNOSIS — E66.812 CLASS 2 SEVERE OBESITY WITH SERIOUS COMORBIDITY AND BODY MASS INDEX (BMI) OF 35.0 TO 35.9 IN ADULT, UNSPECIFIED OBESITY TYPE (HCC): Primary | ICD-10-CM

## 2025-02-10 DIAGNOSIS — E66.01 CLASS 2 SEVERE OBESITY WITH SERIOUS COMORBIDITY AND BODY MASS INDEX (BMI) OF 35.0 TO 35.9 IN ADULT, UNSPECIFIED OBESITY TYPE (HCC): Primary | ICD-10-CM

## 2025-02-10 DIAGNOSIS — K80.20 CALCULUS OF GALLBLADDER WITHOUT CHOLECYSTITIS WITHOUT OBSTRUCTION: Primary | ICD-10-CM

## 2025-02-10 LAB
ALBUMIN SERPL BCG-MCNC: 4.2 G/DL (ref 3.5–5)
ALP SERPL-CCNC: 80 U/L (ref 34–104)
ALT SERPL W P-5'-P-CCNC: 20 U/L (ref 7–52)
ANION GAP SERPL CALCULATED.3IONS-SCNC: 6 MMOL/L (ref 4–13)
AST SERPL W P-5'-P-CCNC: 15 U/L (ref 13–39)
ATRIAL RATE: 64 BPM
ATRIAL RATE: 69 BPM
BASOPHILS # BLD AUTO: 0.09 THOUSANDS/ΜL (ref 0–0.1)
BASOPHILS NFR BLD AUTO: 1 % (ref 0–1)
BILIRUB SERPL-MCNC: 0.33 MG/DL (ref 0.2–1)
BUN SERPL-MCNC: 17 MG/DL (ref 5–25)
CALCIUM SERPL-MCNC: 9 MG/DL (ref 8.4–10.2)
CHLORIDE SERPL-SCNC: 105 MMOL/L (ref 96–108)
CO2 SERPL-SCNC: 28 MMOL/L (ref 21–32)
CREAT SERPL-MCNC: 0.69 MG/DL (ref 0.6–1.3)
EOSINOPHIL # BLD AUTO: 0.6 THOUSAND/ΜL (ref 0–0.61)
EOSINOPHIL NFR BLD AUTO: 7 % (ref 0–6)
ERYTHROCYTE [DISTWIDTH] IN BLOOD BY AUTOMATED COUNT: 13.4 % (ref 11.6–15.1)
GFR SERPL CREATININE-BSD FRML MDRD: 96 ML/MIN/1.73SQ M
GLUCOSE P FAST SERPL-MCNC: 89 MG/DL (ref 65–99)
HCT VFR BLD AUTO: 46.5 % (ref 34.8–46.1)
HGB BLD-MCNC: 14.5 G/DL (ref 11.5–15.4)
IMM GRANULOCYTES # BLD AUTO: 0.02 THOUSAND/UL (ref 0–0.2)
IMM GRANULOCYTES NFR BLD AUTO: 0 % (ref 0–2)
LYMPHOCYTES # BLD AUTO: 1.87 THOUSANDS/ΜL (ref 0.6–4.47)
LYMPHOCYTES NFR BLD AUTO: 23 % (ref 14–44)
MCH RBC QN AUTO: 27.2 PG (ref 26.8–34.3)
MCHC RBC AUTO-ENTMCNC: 31.2 G/DL (ref 31.4–37.4)
MCV RBC AUTO: 87 FL (ref 82–98)
MONOCYTES # BLD AUTO: 0.76 THOUSAND/ΜL (ref 0.17–1.22)
MONOCYTES NFR BLD AUTO: 9 % (ref 4–12)
NEUTROPHILS # BLD AUTO: 4.82 THOUSANDS/ΜL (ref 1.85–7.62)
NEUTS SEG NFR BLD AUTO: 60 % (ref 43–75)
NRBC BLD AUTO-RTO: 0 /100 WBCS
P AXIS: 37 DEGREES
P AXIS: 37 DEGREES
PLATELET # BLD AUTO: 276 THOUSANDS/UL (ref 149–390)
PMV BLD AUTO: 9.7 FL (ref 8.9–12.7)
POTASSIUM SERPL-SCNC: 3.9 MMOL/L (ref 3.5–5.3)
PR INTERVAL: 144 MS
PR INTERVAL: 150 MS
PROT SERPL-MCNC: 6.8 G/DL (ref 6.4–8.4)
QRS AXIS: -5 DEGREES
QRS AXIS: -6 DEGREES
QRSD INTERVAL: 80 MS
QRSD INTERVAL: 82 MS
QT INTERVAL: 410 MS
QT INTERVAL: 414 MS
QTC INTERVAL: 428 MS
QTC INTERVAL: 440 MS
RBC # BLD AUTO: 5.34 MILLION/UL (ref 3.81–5.12)
SODIUM SERPL-SCNC: 139 MMOL/L (ref 135–147)
T WAVE AXIS: 34 DEGREES
T WAVE AXIS: 36 DEGREES
VENTRICULAR RATE: 64 BPM
VENTRICULAR RATE: 69 BPM
WBC # BLD AUTO: 8.16 THOUSAND/UL (ref 4.31–10.16)

## 2025-02-10 PROCEDURE — 93010 ELECTROCARDIOGRAM REPORT: CPT | Performed by: INTERNAL MEDICINE

## 2025-02-10 PROCEDURE — 99204 OFFICE O/P NEW MOD 45 MIN: CPT | Performed by: SURGERY

## 2025-02-10 PROCEDURE — 80053 COMPREHEN METABOLIC PANEL: CPT

## 2025-02-10 PROCEDURE — 36415 COLL VENOUS BLD VENIPUNCTURE: CPT

## 2025-02-10 PROCEDURE — 85025 COMPLETE CBC W/AUTO DIFF WBC: CPT

## 2025-02-10 PROCEDURE — 93005 ELECTROCARDIOGRAM TRACING: CPT

## 2025-02-10 RX ORDER — CEFAZOLIN SODIUM 2 G/50ML
2000 SOLUTION INTRAVENOUS ONCE
Status: CANCELLED | OUTPATIENT
Start: 2025-02-28 | End: 2025-02-10

## 2025-02-10 RX ORDER — SODIUM CHLORIDE, SODIUM LACTATE, POTASSIUM CHLORIDE, CALCIUM CHLORIDE 600; 310; 30; 20 MG/100ML; MG/100ML; MG/100ML; MG/100ML
125 INJECTION, SOLUTION INTRAVENOUS CONTINUOUS
Status: CANCELLED | OUTPATIENT
Start: 2025-02-28

## 2025-02-10 RX ORDER — HEPARIN SODIUM 5000 [USP'U]/ML
5000 INJECTION, SOLUTION INTRAVENOUS; SUBCUTANEOUS ONCE
Status: CANCELLED | OUTPATIENT
Start: 2025-02-28 | End: 2025-02-10

## 2025-02-10 RX ORDER — TIRZEPATIDE 2.5 MG/.5ML
2.5 INJECTION, SOLUTION SUBCUTANEOUS WEEKLY
Qty: 2 ML | Refills: 0 | Status: SHIPPED | OUTPATIENT
Start: 2025-02-10 | End: 2025-02-11 | Stop reason: SDUPTHER

## 2025-02-10 NOTE — TELEPHONE ENCOUNTER
Please have PCP order either wegovy or zepbound if appropriate.  so the PA team can submit the prior auth. Thank you!

## 2025-02-10 NOTE — TELEPHONE ENCOUNTER
Patient calling to follow up on Zepbound prescription prior auth. Would like the office to notify her of the outcome of the prior auth. Did inform patient that the estimated turnaround time is 7-21 business days. Please advise.

## 2025-02-10 NOTE — TELEPHONE ENCOUNTER
PA for Zepbound) 2.5 mg/0.5 mL auto-injector SUBMITTED to     via    [x][facility]-KEY: [id number]  []Surescripts-Case ID #   []Availity-Auth ID # NDC #   []Faxed to plan   []Other website   []Phone call Case ID #     [x]PA sent as URGENT    All office notes, labs and other pertaining documents and studies sent. Clinical questions answered. Awaiting determination from insurance company.     Turnaround time for your insurance to make a decision on your Prior Authorization can take 7-21 business days.

## 2025-02-11 ENCOUNTER — TELEPHONE (OUTPATIENT)
Age: 60
End: 2025-02-11

## 2025-02-11 DIAGNOSIS — E66.01 CLASS 2 SEVERE OBESITY WITH SERIOUS COMORBIDITY AND BODY MASS INDEX (BMI) OF 35.0 TO 35.9 IN ADULT, UNSPECIFIED OBESITY TYPE (HCC): ICD-10-CM

## 2025-02-11 DIAGNOSIS — E66.812 CLASS 2 SEVERE OBESITY WITH SERIOUS COMORBIDITY AND BODY MASS INDEX (BMI) OF 35.0 TO 35.9 IN ADULT, UNSPECIFIED OBESITY TYPE (HCC): ICD-10-CM

## 2025-02-11 RX ORDER — TIRZEPATIDE 2.5 MG/.5ML
2.5 INJECTION, SOLUTION SUBCUTANEOUS WEEKLY
Qty: 2 ML | Refills: 0 | Status: SHIPPED | OUTPATIENT
Start: 2025-02-11 | End: 2025-03-11

## 2025-02-11 NOTE — TELEPHONE ENCOUNTER
PA for Zepbound 2.5 mg  APPROVED     Date(s) approved 12/10/2024 to 09/09/2025      Patient advised by          []MyChart Message  [x]Phone call   []LMOM  []L/M to call office as no active Communication consent on file  []Unable to leave detailed message as VM not approved on Communication consent       Pharmacy advised by    [x]Fax  []Phone call    Approval letter scanned into Media Yes

## 2025-02-11 NOTE — TELEPHONE ENCOUNTER
Called and spoke with patient regarding the approval for Zepbound 2.5 mg. Pt is asking if the script can be sent to [facility]  in [facility]. Pt states [facility] sell any weight loss medications.

## 2025-02-12 ENCOUNTER — OFFICE VISIT (OUTPATIENT)
Age: 60
End: 2025-02-12
Payer: COMMERCIAL

## 2025-02-12 VITALS
TEMPERATURE: 97.7 F | OXYGEN SATURATION: 98 % | WEIGHT: 228 LBS | HEART RATE: 72 BPM | BODY MASS INDEX: 35.79 KG/M2 | DIASTOLIC BLOOD PRESSURE: 84 MMHG | HEIGHT: 67 IN | SYSTOLIC BLOOD PRESSURE: 128 MMHG

## 2025-02-12 DIAGNOSIS — R11.0 NAUSEA: ICD-10-CM

## 2025-02-12 DIAGNOSIS — K63.8219 SMALL INTESTINAL BACTERIAL OVERGROWTH (SIBO): ICD-10-CM

## 2025-02-12 DIAGNOSIS — K44.9 HIATAL HERNIA: ICD-10-CM

## 2025-02-12 DIAGNOSIS — R10.13 EPIGASTRIC PAIN: ICD-10-CM

## 2025-02-12 DIAGNOSIS — K58.0 IRRITABLE BOWEL SYNDROME WITH DIARRHEA: Primary | ICD-10-CM

## 2025-02-12 DIAGNOSIS — Z80.0 FAMILY HISTORY OF COLON CANCER IN MOTHER: ICD-10-CM

## 2025-02-12 DIAGNOSIS — K21.9 GASTROESOPHAGEAL REFLUX DISEASE, UNSPECIFIED WHETHER ESOPHAGITIS PRESENT: ICD-10-CM

## 2025-02-12 DIAGNOSIS — K76.0 STEATOSIS OF LIVER: ICD-10-CM

## 2025-02-12 PROCEDURE — 99214 OFFICE O/P EST MOD 30 MIN: CPT | Performed by: NURSE PRACTITIONER

## 2025-02-12 RX ORDER — DICYCLOMINE HCL 20 MG
20 TABLET ORAL 3 TIMES DAILY
Qty: 90 TABLET | Refills: 1 | Status: SHIPPED | OUTPATIENT
Start: 2025-02-12

## 2025-02-12 RX ORDER — OMEPRAZOLE 40 MG/1
40 CAPSULE, DELAYED RELEASE ORAL DAILY
Qty: 90 CAPSULE | Refills: 1 | Status: SHIPPED | OUTPATIENT
Start: 2025-02-12

## 2025-02-12 RX ORDER — FAMOTIDINE 20 MG/1
20 TABLET, FILM COATED ORAL 2 TIMES DAILY
Qty: 60 TABLET | Refills: 3 | Status: SHIPPED | OUTPATIENT
Start: 2025-02-12

## 2025-02-12 NOTE — ASSESSMENT & PLAN NOTE
While the patient was in the office today, I discussed with the patient at this point time it appears that a majority of her symptoms can be correlated to irritable bowel syndrome symptoms with diarrhea and that for now the best we can do a try to manage symptoms and prevent future cyclical episodes is much as possible.    I did discuss with the patient that there is definitely a direct correlation between stress and anxiety and her irritable bowel syndrome symptoms, which is confirmed by the fact that she does seem to see some improvement since starting the Elavil and I feel it is in her best interest to continue the Elavil and discussed the possibility of maybe increasing the dosage to 20 mg at her next office visit with her primary care provider as this could help keep things stable and more manageable long-term.  The patient was agreeable and verbalized an understanding.    Encouraged the patient to continue use dicyclomine as needed for any abdominal pain, spasms, or loose stools.  Can continue Lomotil as needed for any loose stool/diarrhea.  Encouraged the patient to continue to try to drink at least 64 ounces of water daily.  Orders:  •  dicyclomine (BENTYL) 20 mg tablet; Take 1 tablet (20 mg total) by mouth 3 (three) times a day

## 2025-02-12 NOTE — ASSESSMENT & PLAN NOTE
Orders:  •  omeprazole (PriLOSEC) 40 MG capsule; Take 1 capsule (40 mg total) by mouth daily Take 1 PO QD in AM prior to a meal.  •  famotidine (PEPCID) 20 mg tablet; Take 1 tablet (20 mg total) by mouth 2 (two) times a day

## 2025-02-12 NOTE — ASSESSMENT & PLAN NOTE
I discussed with the patient and with her recent EGD again showing a hiatal hernia and the gastritis, I feel for now, will be beneficial to continue with the omeprazole and famotidine as prescribed with the hope that in the future we can work on de-escalating doses.  The patient was agreeable and verbalized an understanding.    Encouraged the patient to continue to try to avoid acidic or trigger foods much as possible.  Encouraged the patient to try to currently remain on a bland diet and avoid any acidic or greasy foods for now.  Orders:  •  omeprazole (PriLOSEC) 40 MG capsule; Take 1 capsule (40 mg total) by mouth daily Take 1 PO QD in AM prior to a meal.  •  famotidine (PEPCID) 20 mg tablet; Take 1 tablet (20 mg total) by mouth 2 (two) times a day

## 2025-02-12 NOTE — ASSESSMENT & PLAN NOTE
Since the patient is status post a colonoscopy, but is currently not showing any red flag symptoms, we will proceed with a repeat colonoscopy as recommended unless they would start to develop red flag symptoms in the future: DUE: [birth year]     Reviewed GI red flag symptoms with patient today.

## 2025-02-12 NOTE — PROGRESS NOTES
Name: Sara Harrell      : 1965      MRN: [id number]  Encounter Provider: JAMES Delcid  Encounter Date: 2025   Encounter department: [facility]  :  Assessment & Plan  Irritable bowel syndrome with diarrhea  While the patient was in the office today, I discussed with the patient at this point time it appears that a majority of her symptoms can be correlated to irritable bowel syndrome symptoms with diarrhea and that for now the best we can do a try to manage symptoms and prevent future cyclical episodes is much as possible.    I did discuss with the patient that there is definitely a direct correlation between stress and anxiety and her irritable bowel syndrome symptoms, which is confirmed by the fact that she does seem to see some improvement since starting the Elavil and I feel it is in her best interest to continue the Elavil and discussed the possibility of maybe increasing the dosage to 20 mg at her next office visit with her primary care provider as this could help keep things stable and more manageable long-term.  The patient was agreeable and verbalized an understanding.    Encouraged the patient to continue use dicyclomine as needed for any abdominal pain, spasms, or loose stools.  Can continue Lomotil as needed for any loose stool/diarrhea.  Encouraged the patient to continue to try to drink at least 64 ounces of water daily.  Orders:  •  dicyclomine (BENTYL) 20 mg tablet; Take 1 tablet (20 mg total) by mouth 3 (three) times a day    Small intestinal bacterial overgrowth (SIBO)  I discussed with the patient that SIBO can definitely be another underlying component that only compounds her irritable bowel syndrome symptoms and for now since there has been some improvement and she is proceeding with a cholecystectomy, we will hold off any repeat doses of the Xifaxan, however, we can always consider Xifaxan as a treatment plan option in the future,  especially during a flareup.  However, I did discuss with the patient that our goal is not to use the Xifaxan more than once or twice a year if possible.  The patient was agreeable and verbalized an understanding.    Continue dicyclomine as ordered.  Orders:  •  dicyclomine (BENTYL) 20 mg tablet; Take 1 tablet (20 mg total) by mouth 3 (three) times a day    Gastroesophageal reflux disease, unspecified whether esophagitis present  I discussed with the patient and with her recent EGD again showing a hiatal hernia and the gastritis, I feel for now, will be beneficial to continue with the omeprazole and famotidine as prescribed with the hope that in the future we can work on de-escalating doses.  The patient was agreeable and verbalized an understanding.    Encouraged the patient to continue to try to avoid acidic or trigger foods much as possible.  Encouraged the patient to try to currently remain on a bland diet and avoid any acidic or greasy foods for now.  Orders:  •  omeprazole (PriLOSEC) 40 MG capsule; Take 1 capsule (40 mg total) by mouth daily Take 1 PO QD in AM prior to a meal.  •  famotidine (PEPCID) 20 mg tablet; Take 1 tablet (20 mg total) by mouth 2 (two) times a day    Hiatal hernia  Orders:  •  omeprazole (PriLOSEC) 40 MG capsule; Take 1 capsule (40 mg total) by mouth daily Take 1 PO QD in AM prior to a meal.  •  famotidine (PEPCID) 20 mg tablet; Take 1 tablet (20 mg total) by mouth 2 (two) times a day    Nausea  I discussed with the patient that at this point I do feel it is in her best interest to proceed with the cholecystectomy with the understanding that there is a chance that the cholecystectomy will not provide any significant relief or improvement and that can take as long as a year after surgery for everything to stabilize and know if it is a significant contributor to her symptoms.  The patient was agreeable and verbalized an understanding.    Continue to use Zofran as needed for any nausea or  vomiting.  Orders:  •  omeprazole (PriLOSEC) 40 MG capsule; Take 1 capsule (40 mg total) by mouth daily Take 1 PO QD in AM prior to a meal.  •  famotidine (PEPCID) 20 mg tablet; Take 1 tablet (20 mg total) by mouth 2 (two) times a day    Epigastric pain  Orders:  •  omeprazole (PriLOSEC) 40 MG capsule; Take 1 capsule (40 mg total) by mouth daily Take 1 PO QD in AM prior to a meal.  •  famotidine (PEPCID) 20 mg tablet; Take 1 tablet (20 mg total) by mouth 2 (two) times a day    Family history of colon cancer in mother  Since the patient is status post a colonoscopy, but is currently not showing any red flag symptoms, we will proceed with a repeat colonoscopy as recommended unless they would start to develop red flag symptoms in the future: DUE: 1/17/35     Reviewed GI red flag symptoms with patient today.         Steatosis of liver  Discussed recommendations in regards to fatty liver including:   Strict control of contributing comorbidities (obesity, prediabetes/diabetes, hypertension, and hypertriglyceridemia).  Weight loss of approx 10-15% of patient's current body weight over a period of 6-12 months through low fat diet and cardiovascular exercise as tolerated.   Limiting alcohol consumption, preferably complete abstinence.  Monitor hepatic function every 6 months with routine labs.     Orders:  •  CBC and differential; Future  •  Comprehensive metabolic panel; Future  •  Protime-INR; Future  •  Bilirubin, direct; Future    The patient is to schedule a follow up office visit in 6 months, but understands to call or contact our offices with any issues before then or as needed.     History of Present Illness   HPI  Sara Harrell is a 59 y.o. female who presents today for a follow-up visit status post her most recent EGD and colonoscopy regarding her irritable bowel syndrome symptoms with diarrhea, SIBO, GERD symptoms, nausea, epigastric pain, hiatal hernia, family history of colon cancer, and steatosis of the  liver.  The patient is status post her most recent EGD and colonoscopy on January 17, 2025 with Dr. Nunn with the EGD showing a 2 cm hiatal hernia with gastritis and the pathology being normal.  The colonoscopy showed moderate diverticulosis, otherwise normal with a recommendation of a repeat colonoscopy in 10 years since her mother had colon cancer in her late 80s.    The patient reports that she continues with irritable bowel syndrome like symptoms with the GERD symptoms, nausea, and postprandial epigastric pain.  She recently followed up with Dr. Santana of general surgery who at this point with the gallstones and her symptoms and with the upper and lower GI tract being ruled out, it would be in her best interest to proceed with a cholecystectomy and is scheduled on February 28, 2025.  The patient is hopeful and aware that the gallbladder may not solve all of her issues, especially with underlying SIBO and irritable bowel syndrome symptoms.    Since her last office visit she did follow-up with her primary care provider who also started her on Elavil 10 mg, which she has been on for now a little over a week and does feel it is actually starting to help as today is one of the first day she has felt relatively normal in quite some time.  The patient reports that she continues to take the omeprazole daily, but is currently using the famotidine as needed with her reflux symptoms being relatively managed.  She reports she has not needed to take the Zofran in quite some time and has not been using the dicyclomine regularly as well as the Lomotil.    The patient currently denies any reflux, nausea, dysphagia, vomiting, decreased appetite, unplanned weight loss, or abdominal pain. Water Intake: Adequates per day.    The patient currently reports that they have a BM daily and reports that it is sometimes relieving, without any consistent nocturnal BMs, constipation, straining, melena or bloody stools. Last BM: Today. Flatus:  "Normal for her.    Meds: Omeprazole 40 mg daily, famotidine 20 mg twice daily as needed, Zofran as needed for nausea, dicyclomine as needed for abdominal pain/spasms/loose stools, and Lomotil as needed for loose stools.  Daily NSAID Use: Denied    Tobacco: Denied  ETOH: Socially  Marijuana: Denied  Illicit Drug Use: Denied    Imaging: (12/13/24): Right upper quadrant ultrasound: Cholelithiasis.     Mildly enlarged echogenic liver which may reflect fatty infiltration versus hepatic parenchymal disease of other etiology. No focal lesion detected.    Endoscopy History: EGD: (1/17/25): 2 cm hiatal hernia and gastritis. Path: Normal.    COLONOSCOPY: (1/17/25): Moderate diverticulosis otherwise normal with a recommendation of a repeat colonoscopy in 10 years.    DUE: 1/17/35     History obtained from: patient    Review of Systems       Objective   /84 (BP Location: Left arm, Patient Position: Sitting, Cuff Size: Large)   Pulse 72   Temp 97.7 °F (36.5 °C) (Temporal)   Ht 5' 7\" (1.702 m)   Wt 103 kg (228 lb)   LMP  (LMP Unknown)   SpO2 98%   BMI 35.71 kg/m²      Physical Exam   Abdomen is soft distended, nontender, with normal bowel sounds x 4.  No edema noted of the b/l lower extremities upon exam today. Skin is non-icteric.       "

## 2025-02-12 NOTE — PATIENT INSTRUCTIONS
Omeprazole 40, 1 pill daily in AM.   Continue Famotidine, 2 x daily as needed for break through symptoms.   Continue Dicyclomine/Lomotil for abdominal pain spasms/diarrhea.   Continue Zofran as needed for any nausea.   Continue Elavil 10 mg daily and f/u with PCP.   Discussed recommendations in regards to fatty liver including:   Strict control of contributing comorbidities (obesity, prediabetes/diabetes, hypertension, and hypertriglyceridemia).  Weight loss of approx 10-15% of patient's current body weight over a period of 6-12 months through low fat diet and cardiovascular exercise as tolerated.   Limiting alcohol consumption, preferably complete abstinence.  Monitor hepatic function every 6 months with routine labs.   Continue to watch for red flag symptoms.   Schedule a f/u OV in 6 months.     We did review GI red flag symptoms, including, but not limited to: chronic nausea, vomiting, diarrhea, chills, fever, and unintentional weight loss and should call or contact our office with any changes or concerns. I reviewed with the patient that if they notice any blood while vomiting or in their stool they should contact or office or go to the nearest emergency room for immediate evaluation. The patient was agreeable and verbalized an understanding.

## 2025-02-12 NOTE — ASSESSMENT & PLAN NOTE
I discussed with the patient that at this point I do feel it is in her best interest to proceed with the cholecystectomy with the understanding that there is a chance that the cholecystectomy will not provide any significant relief or improvement and that can take as long as a year after surgery for everything to stabilize and know if it is a significant contributor to her symptoms.  The patient was agreeable and verbalized an understanding.    Continue to use Zofran as needed for any nausea or vomiting.  Orders:  •  omeprazole (PriLOSEC) 40 MG capsule; Take 1 capsule (40 mg total) by mouth daily Take 1 PO QD in AM prior to a meal.  •  famotidine (PEPCID) 20 mg tablet; Take 1 tablet (20 mg total) by mouth 2 (two) times a day

## 2025-02-12 NOTE — ASSESSMENT & PLAN NOTE
Orders:  •  omeprazole (PriLOSEC) 40 MG capsule; Take 1 capsule (40 mg total) by mouth daily Take 1 PO QD in AM prior to a meal.

## 2025-02-12 NOTE — ASSESSMENT & PLAN NOTE
I discussed with the patient that SIBO can definitely be another underlying component that only compounds her irritable bowel syndrome symptoms and for now since there has been some improvement and she is proceeding with a cholecystectomy, we will hold off any repeat doses of the Xifaxan, however, we can always consider Xifaxan as a treatment plan option in the future, especially during a flareup.  However, I did discuss with the patient that our goal is not to use the Xifaxan more than once or twice a year if possible.  The patient was agreeable and verbalized an understanding.    Continue dicyclomine as ordered.  Orders:  •  dicyclomine (BENTYL) 20 mg tablet; Take 1 tablet (20 mg total) by mouth 3 (three) times a day

## 2025-02-19 NOTE — PRE-PROCEDURE INSTRUCTIONS
Pre-Surgery Instructions:   Medication Instructions    amitriptyline (ELAVIL) 10 mg tablet Take night before surgery    cetirizine (ZyrTEC) 10 mg tablet Take day of surgery.    clotrimazole-betamethasone (LOTRISONE) 1-0.05 % cream Uses PRN- DO NOT take day of surgery    dicyclomine (BENTYL) 20 mg tablet Uses PRN- DO NOT take day of surgery    diphenoxylate-atropine (LOMOTIL) 2.5-0.025 mg per tablet Uses PRN- DO NOT take day of surgery    famotidine (PEPCID) 20 mg tablet Uses PRN- OK to take day of surgery    omeprazole (PriLOSEC) 40 MG capsule Take day of surgery.    ondansetron (ZOFRAN) 4 mg tablet Uses PRN- OK to take day of surgery    VITAMIN D PO Hold day of surgery.   Medication instructions for day of surgery reviewed. Please take all instructed medications with only a sip of water.       You will receive a call one business day prior to surgery with an arrival time and hospital directions. If your surgery is scheduled on a Monday, the hospital will be calling you on the Friday prior to your surgery. If you have not heard from anyone by 8pm, please call the hospital supervisor through the hospital  at [phone]. ([place] [phone] or [place] [phone]).    Do not eat or drink anything after midnight the night before your surgery, including candy, mints, lifesavers, or chewing gum. Do not drink alcohol 24hrs before your surgery. Try not to smoke at least 24hrs before your surgery.       Follow the pre surgery showering instructions as listed in the “My Surgical Experience Booklet” or otherwise provided by your surgeon's office. Do not use a blade to shave the surgical area 1 week before surgery. It is okay to use a clean electric clippers up to 24 hours before surgery. Do not apply any lotions, creams, including makeup, cologne, deodorant, or perfumes after showering on the day of your surgery. Do not use dry shampoo, hair spray, hair gel, or any type of hair products.     No contact lenses,  eye make-up, or artificial eyelashes. Remove nail polish, including gel polish, and any artificial, gel, or acrylic nails if possible. Remove all jewelry including rings and body piercing jewelry.     Wear causal clothing that is easy to take on and off. Consider your type of surgery.    Keep any valuables, jewelry, piercings at home. Please bring any specially ordered equipment (sling, braces) if indicated.    Arrange for a responsible person to drive you to and from the hospital on the day of your surgery. Please confirm the visitor policy for the day of your procedure when you receive your phone call with an arrival time.     Call the surgeon's office with any new illnesses, exposures, or additional questions prior to surgery.    Please reference your “My Surgical Experience Booklet” for additional information to prepare for your upcoming surgery.

## 2025-02-20 ENCOUNTER — ANESTHESIA EVENT (OUTPATIENT)
Dept: PERIOP | Facility: HOSPITAL | Age: 60
End: 2025-02-20
Payer: COMMERCIAL

## 2025-02-25 DIAGNOSIS — K58.0 IRRITABLE BOWEL SYNDROME WITH DIARRHEA: ICD-10-CM

## 2025-02-25 RX ORDER — AMITRIPTYLINE HYDROCHLORIDE 10 MG/1
10 TABLET ORAL
Qty: 45 TABLET | Refills: 0 | Status: SHIPPED | OUTPATIENT
Start: 2025-02-25

## 2025-02-25 NOTE — TELEPHONE ENCOUNTER
Pt called in to provide update to Dr. Hill on how she has been doing with medication: amitriptyline (ELAVIL) 10 mg tablet     Pt reports that it seems to be helping her. Also, she recently had an appt with her GI doctor, and that he informed her he thinks it a good idea that she is on the medication.     Pt states she is almost out of the medication & is requesting for Dr. Hill to please put in a refill for it?    Requested Prescriptions     Pending Prescriptions Disp Refills    amitriptyline (ELAVIL) 10 mg tablet 45 tablet 0     Sig: Take 1 tablet (10 mg total) by mouth daily at bedtime     Script going to the following pharmacy:  [facility] [place] - [address] [place] [address] [phone]     Please advise & call pt back with update . Thank you!    Stacy: [phone]

## 2025-02-28 ENCOUNTER — HOSPITAL ENCOUNTER (OUTPATIENT)
Facility: HOSPITAL | Age: 60
Setting detail: OUTPATIENT SURGERY
Discharge: HOME | End: 2025-02-28
Attending: SURGERY | Admitting: SURGERY
Payer: COMMERCIAL

## 2025-02-28 ENCOUNTER — ANESTHESIA (OUTPATIENT)
Dept: PERIOP | Facility: HOSPITAL | Age: 60
End: 2025-02-28
Payer: COMMERCIAL

## 2025-02-28 VITALS
HEIGHT: 67 IN | OXYGEN SATURATION: 99 % | DIASTOLIC BLOOD PRESSURE: 71 MMHG | WEIGHT: 220 LBS | BODY MASS INDEX: 34.53 KG/M2 | HEART RATE: 77 BPM | SYSTOLIC BLOOD PRESSURE: 134 MMHG | RESPIRATION RATE: 16 BRPM | TEMPERATURE: 98.8 F

## 2025-02-28 DIAGNOSIS — K80.20 CALCULUS OF GALLBLADDER WITHOUT CHOLECYSTITIS WITHOUT OBSTRUCTION: ICD-10-CM

## 2025-02-28 PROCEDURE — 47562 LAPAROSCOPIC CHOLECYSTECTOMY: CPT | Performed by: SURGERY

## 2025-02-28 PROCEDURE — 88304 TISSUE EXAM BY PATHOLOGIST: CPT | Performed by: PATHOLOGY

## 2025-02-28 PROCEDURE — 47562 LAPAROSCOPIC CHOLECYSTECTOMY: CPT

## 2025-02-28 RX ORDER — ONDANSETRON 2 MG/ML
INJECTION INTRAMUSCULAR; INTRAVENOUS
Status: COMPLETED
Start: 2025-02-28 | End: 2025-02-28

## 2025-02-28 RX ORDER — ACETAMINOPHEN 10 MG/ML
1000 INJECTION, SOLUTION INTRAVENOUS ONCE
Status: COMPLETED | OUTPATIENT
Start: 2025-02-28 | End: 2025-02-28

## 2025-02-28 RX ORDER — DEXAMETHASONE SODIUM PHOSPHATE 10 MG/ML
INJECTION, SOLUTION INTRAMUSCULAR; INTRAVENOUS AS NEEDED
Status: DISCONTINUED | OUTPATIENT
Start: 2025-02-28 | End: 2025-02-28

## 2025-02-28 RX ORDER — MAGNESIUM HYDROXIDE 1200 MG/15ML
LIQUID ORAL AS NEEDED
Status: DISCONTINUED | OUTPATIENT
Start: 2025-02-28 | End: 2025-02-28 | Stop reason: HOSPADM

## 2025-02-28 RX ORDER — FENTANYL CITRATE 50 UG/ML
INJECTION, SOLUTION INTRAMUSCULAR; INTRAVENOUS AS NEEDED
Status: DISCONTINUED | OUTPATIENT
Start: 2025-02-28 | End: 2025-02-28

## 2025-02-28 RX ORDER — CEFAZOLIN SODIUM 2 G/50ML
2000 SOLUTION INTRAVENOUS ONCE
Status: COMPLETED | OUTPATIENT
Start: 2025-02-28 | End: 2025-02-28

## 2025-02-28 RX ORDER — ROCURONIUM BROMIDE 10 MG/ML
INJECTION, SOLUTION INTRAVENOUS AS NEEDED
Status: DISCONTINUED | OUTPATIENT
Start: 2025-02-28 | End: 2025-02-28

## 2025-02-28 RX ORDER — OXYCODONE HYDROCHLORIDE 5 MG/1
5 TABLET ORAL EVERY 4 HOURS PRN
Qty: 12 TABLET | Refills: 0 | Status: SHIPPED | OUTPATIENT
Start: 2025-02-28 | End: 2025-03-14 | Stop reason: ALTCHOICE

## 2025-02-28 RX ORDER — FENTANYL CITRATE/PF 50 MCG/ML
50 SYRINGE (ML) INJECTION
Status: DISCONTINUED | OUTPATIENT
Start: 2025-02-28 | End: 2025-02-28 | Stop reason: HOSPADM

## 2025-02-28 RX ORDER — SODIUM CHLORIDE, SODIUM LACTATE, POTASSIUM CHLORIDE, CALCIUM CHLORIDE 600; 310; 30; 20 MG/100ML; MG/100ML; MG/100ML; MG/100ML
INJECTION, SOLUTION INTRAVENOUS CONTINUOUS PRN
Status: DISCONTINUED | OUTPATIENT
Start: 2025-02-28 | End: 2025-02-28

## 2025-02-28 RX ORDER — BUPIVACAINE HYDROCHLORIDE 5 MG/ML
INJECTION, SOLUTION EPIDURAL; INTRACAUDAL AS NEEDED
Status: DISCONTINUED | OUTPATIENT
Start: 2025-02-28 | End: 2025-02-28 | Stop reason: HOSPADM

## 2025-02-28 RX ORDER — PROPOFOL 10 MG/ML
INJECTION, EMULSION INTRAVENOUS AS NEEDED
Status: DISCONTINUED | OUTPATIENT
Start: 2025-02-28 | End: 2025-02-28

## 2025-02-28 RX ORDER — ONDANSETRON 2 MG/ML
INJECTION INTRAMUSCULAR; INTRAVENOUS AS NEEDED
Status: DISCONTINUED | OUTPATIENT
Start: 2025-02-28 | End: 2025-02-28

## 2025-02-28 RX ORDER — SODIUM CHLORIDE, SODIUM LACTATE, POTASSIUM CHLORIDE, CALCIUM CHLORIDE 600; 310; 30; 20 MG/100ML; MG/100ML; MG/100ML; MG/100ML
20 INJECTION, SOLUTION INTRAVENOUS CONTINUOUS
Status: DISCONTINUED | OUTPATIENT
Start: 2025-02-28 | End: 2025-02-28 | Stop reason: HOSPADM

## 2025-02-28 RX ORDER — HEPARIN SODIUM 5000 [USP'U]/ML
5000 INJECTION, SOLUTION INTRAVENOUS; SUBCUTANEOUS ONCE
Status: COMPLETED | OUTPATIENT
Start: 2025-02-28 | End: 2025-02-28

## 2025-02-28 RX ORDER — ONDANSETRON 2 MG/ML
4 INJECTION INTRAMUSCULAR; INTRAVENOUS ONCE AS NEEDED
Status: DISCONTINUED | OUTPATIENT
Start: 2025-02-28 | End: 2025-02-28 | Stop reason: HOSPADM

## 2025-02-28 RX ORDER — FENTANYL CITRATE 50 UG/ML
INJECTION, SOLUTION INTRAMUSCULAR; INTRAVENOUS
Status: COMPLETED
Start: 2025-02-28 | End: 2025-02-28

## 2025-02-28 RX ORDER — SODIUM CHLORIDE, SODIUM LACTATE, POTASSIUM CHLORIDE, CALCIUM CHLORIDE 600; 310; 30; 20 MG/100ML; MG/100ML; MG/100ML; MG/100ML
125 INJECTION, SOLUTION INTRAVENOUS CONTINUOUS
Status: DISCONTINUED | OUTPATIENT
Start: 2025-02-28 | End: 2025-02-28 | Stop reason: HOSPADM

## 2025-02-28 RX ORDER — OXYCODONE HYDROCHLORIDE 5 MG/1
5 TABLET ORAL EVERY 4 HOURS PRN
Refills: 0 | Status: DISCONTINUED | OUTPATIENT
Start: 2025-02-28 | End: 2025-02-28 | Stop reason: HOSPADM

## 2025-02-28 RX ORDER — SCOPOLAMINE 1 MG/3D
PATCH, EXTENDED RELEASE TRANSDERMAL
Status: COMPLETED
Start: 2025-02-28 | End: 2025-02-28

## 2025-02-28 RX ORDER — MIDAZOLAM HYDROCHLORIDE 2 MG/2ML
INJECTION, SOLUTION INTRAMUSCULAR; INTRAVENOUS AS NEEDED
Status: DISCONTINUED | OUTPATIENT
Start: 2025-02-28 | End: 2025-02-28

## 2025-02-28 RX ADMIN — SODIUM CHLORIDE, SODIUM LACTATE, POTASSIUM CHLORIDE, AND CALCIUM CHLORIDE: .6; .31; .03; .02 INJECTION, SOLUTION INTRAVENOUS at 13:39

## 2025-02-28 RX ADMIN — ROCURONIUM BROMIDE 10 MG: 10 INJECTION, SOLUTION INTRAVENOUS at 13:59

## 2025-02-28 RX ADMIN — SUGAMMADEX 200 MG: 100 INJECTION, SOLUTION INTRAVENOUS at 14:43

## 2025-02-28 RX ADMIN — ONDANSETRON 4 MG: 2 INJECTION INTRAMUSCULAR; INTRAVENOUS at 15:42

## 2025-02-28 RX ADMIN — FENTANYL CITRATE 50 MCG: 50 INJECTION, SOLUTION INTRAMUSCULAR; INTRAVENOUS at 12:49

## 2025-02-28 RX ADMIN — FENTANYL CITRATE 50 MCG: 50 INJECTION, SOLUTION INTRAMUSCULAR; INTRAVENOUS at 14:50

## 2025-02-28 RX ADMIN — FENTANYL CITRATE 50 MCG: 50 INJECTION, SOLUTION INTRAMUSCULAR; INTRAVENOUS at 14:26

## 2025-02-28 RX ADMIN — ROCURONIUM BROMIDE 10 MG: 10 INJECTION, SOLUTION INTRAVENOUS at 13:32

## 2025-02-28 RX ADMIN — HEPARIN SODIUM 5000 UNITS: 5000 INJECTION INTRAVENOUS; SUBCUTANEOUS at 12:12

## 2025-02-28 RX ADMIN — MIDAZOLAM 2 MG: 1 INJECTION INTRAMUSCULAR; INTRAVENOUS at 12:37

## 2025-02-28 RX ADMIN — PROPOFOL 200 MG: 10 INJECTION, EMULSION INTRAVENOUS at 12:42

## 2025-02-28 RX ADMIN — SCOPOLAMINE 1 PATCH: 1.5 PATCH, EXTENDED RELEASE TRANSDERMAL at 12:22

## 2025-02-28 RX ADMIN — CEFAZOLIN SODIUM 2000 MG: 2 SOLUTION INTRAVENOUS at 12:49

## 2025-02-28 RX ADMIN — DEXAMETHASONE SODIUM PHOSPHATE 5 MG: 10 INJECTION, SOLUTION INTRAMUSCULAR; INTRAVENOUS at 12:42

## 2025-02-28 RX ADMIN — SODIUM CHLORIDE, SODIUM LACTATE, POTASSIUM CHLORIDE, AND CALCIUM CHLORIDE 125 ML/HR: .6; .31; .03; .02 INJECTION, SOLUTION INTRAVENOUS at 12:12

## 2025-02-28 RX ADMIN — ACETAMINOPHEN 1000 MG: 10 INJECTION INTRAVENOUS at 15:48

## 2025-02-28 RX ADMIN — OXYCODONE HYDROCHLORIDE 5 MG: 5 TABLET ORAL at 16:16

## 2025-02-28 RX ADMIN — ONDANSETRON 4 MG: 2 INJECTION INTRAMUSCULAR; INTRAVENOUS at 14:07

## 2025-02-28 RX ADMIN — ROCURONIUM BROMIDE 50 MG: 10 INJECTION, SOLUTION INTRAVENOUS at 12:42

## 2025-02-28 RX ADMIN — SODIUM CHLORIDE, SODIUM LACTATE, POTASSIUM CHLORIDE, AND CALCIUM CHLORIDE: .6; .31; .03; .02 INJECTION, SOLUTION INTRAVENOUS at 12:37

## 2025-02-28 RX ADMIN — Medication 50 MCG: at 15:06

## 2025-02-28 RX ADMIN — ONDANSETRON 4 MG: 2 INJECTION INTRAMUSCULAR; INTRAVENOUS at 12:42

## 2025-02-28 RX ADMIN — FENTANYL CITRATE 50 MCG: 50 INJECTION INTRAMUSCULAR; INTRAVENOUS at 15:06

## 2025-02-28 RX ADMIN — FENTANYL CITRATE 50 MCG: 50 INJECTION, SOLUTION INTRAMUSCULAR; INTRAVENOUS at 12:42

## 2025-02-28 NOTE — H&P (VIEW-ONLY)
Consult - General Surgery   Sara Harrell 59 y.o. female MRN: [id number]  Encounter: [id number]    Assessment & Plan     59F with recent 60 pound weight loss over 1.5 years, reporting episodic abdominal pain with bloating, dyspepsia, foul smelling burps, and greasy stools. Episodes have been coming and going over time and she has gallbladder stones and sludge on her US. She has been following with GI and treated with a PPI, pepcid, bentyl without symptom improvement. I reviewed her EGD, colonoscopy, US imaging and reports. Has been on and off weight loss medication, currently on zepbound. History of GERD, SIBO, IBS.     We discussed that her symptoms and episodes are not classic for biliary colic but that there can be overlapping symptoms with biliary colic, biliary dyskinesia, SIBO, GERD, IBS. She would like to proceed with laparoscopic cholecystectomy with the hope of resolution of a significant amount of her symptoms. She knows that she will require ongoing GI follow up postoperatively and that we may find out by removing her gallbladder that it was a significant contributor to her symptoms. We may also find out that the majority of her symptoms remain and that the gallbladder was not driving her symptoms. She is aware of this and would like to proceed.    Informed consent obtained for laparoscopic, possible open cholecystectomy, possible cholangiogram. Discussed postoperative course, complications including bleeding, infection, bile leak, bile duct injury, retained stones, need for further procedures. Preoperative labs and EKG to be obtained. Will need to hold zepbound prior to OR.    History of Present Illness   Chief Complaint   Patient presents with    Consult     Patient is coming into the office for gallstones states that this has been giving her trouble for 5yrs. Having a lot of loose stools and gas.     5 years of intermittent symptoms which include abdominal pain, fecal urgency, fatty stools, greasy  stools, week long episodes with cramps and loose stools, had a rifaximin trial, did not feel better, had recent EGD colonoscopy, has GERD, on PPI, H2B, bentyl, on and off weight loss medicine including ozempic. She takes PPI BID, pepcid BID, bentyl premeals, PRN zofran and lomotil, elavil. Does not feel bibi her symptoms are controlled. Prior laparoscopic hysterectomy, prior csections x 2, prior open appy. Has had 60 pound weight loss in 1.5 years. Mother had colon cancer in her 80's, brother kidney cancer 60's, great great grandmother gastric cancer. Ruled out for IBD/celiac.     US reviewed shows gallstones with sludge, no inflammatory change. Counseled by GI that cholecystectomy would be helpful to pursue as part of their efforts to address her symptoms.        Review of Systems   Constitutional:  Positive for appetite change. Negative for fever.   Gastrointestinal:  Positive for abdominal distention, abdominal pain and diarrhea.   All other systems reviewed and are negative.      Historical Information   Past Medical History:   Diagnosis Date    Allergic     Asthma due to seasonal allergies     Bronchitis     GERD (gastroesophageal reflux disease)     Kidney stone     Pneumonia     PONV (postoperative nausea and vomiting)     Urinary tract infection      Past Surgical History:   Procedure Laterality Date    ABDOMINAL SURGERY  1995    Csection    APPENDECTOMY  10/1982     SECTION      two-  &     COLONOSCOPY      EGD      HYSTERECTOMY      partial-    JOINT REPLACEMENT      REPLACEMENT TOTAL KNEE BILATERAL      right- , left-     TONSILLECTOMY      WISDOM TOOTH EXTRACTION       Social History   Social History     Substance and Sexual Activity   Alcohol Use Yes    Alcohol/week: 4.0 standard drinks of alcohol    Types: 2 Glasses of wine, 2 Shots of liquor per week    Comment: occasional     Social History     Substance and Sexual Activity   Drug Use No     Social History     Tobacco  Use   Smoking Status Never   Smokeless Tobacco Never     Family History   Problem Relation Age of Onset    Heart disease Father     Hypertension Father     Urolithiasis Mother     Colon cancer Mother     Arthritis Mother     Cancer Mother         Colon cancer    Hearing loss Mother     Kidney cancer Brother     Urolithiasis Brother     Hepatitis Brother         hep c    Kidney disease Brother     Urolithiasis Sister     Diabetes Sister     Kidney disease Sister     Colon polyps Sister     Urolithiasis Daughter        Meds/Allergies     Current Outpatient Medications:     amitriptyline (ELAVIL) 10 mg tablet, Take 1 tablet (10 mg total) by mouth daily at bedtime, Disp: 45 tablet, Rfl: 0    cetirizine (ZyrTEC) 10 mg tablet, Take 10 mg by mouth, Disp: , Rfl:     clotrimazole-betamethasone (LOTRISONE) 1-0.05 % cream, Apply topically in the morning, Disp: , Rfl:     dicyclomine (BENTYL) 20 mg tablet, Take 1 tablet (20 mg total) by mouth 3 (three) times a day (Patient taking differently: Take 20 mg by mouth 3 (three) times a day as needed), Disp: 90 tablet, Rfl: 1    diphenoxylate-atropine (LOMOTIL) 2.5-0.025 mg per tablet, Take 1 tablet by mouth 4 (four) times a day as needed for diarrhea, Disp: 45 tablet, Rfl: 1    famotidine (PEPCID) 20 mg tablet, Take 1 tablet (20 mg total) by mouth 2 (two) times a day (Patient taking differently: Take 20 mg by mouth 2 (two) times a day as needed), Disp: 60 tablet, Rfl: 3    omeprazole (PriLOSEC) 40 MG capsule, Take 1 capsule (40 mg total) by mouth daily Take 1 PO QD in AM prior to a meal., Disp: 90 capsule, Rfl: 1    ondansetron (ZOFRAN) 4 mg tablet, Take 1 tablet (4 mg total) by mouth every 8 (eight) hours as needed for nausea or vomiting, Disp: 20 tablet, Rfl: 1    tirzepatide (Zepbound) 2.5 mg/0.5 mL auto-injector, Inject 0.5 mL (2.5 mg total) under the skin once a week for 28 days (Patient not taking: Reported on 2/19/2025), Disp: 2 mL, Rfl: 0    VITAMIN D PO, Take by mouth every  "other day, Disp: , Rfl:   Allergies   Allergen Reactions    Prednisone Other (See Comments)     Headaches and blood pressure spikes      Sulfa Antibiotics Hives    Vicodin [Hydrocodone-Acetaminophen] Anxiety    Codeine Hives    Colistin      Other reaction(s): Other (See Comments)  colymycin otic    Other Itching     Other reaction(s): Other (See Comments)  burn       The following portions of the patient's history were reviewed and updated as appropriate: allergies, current medications, past family history, past medical history, past social history, past surgical history, and problem list.    Objective   Current Vitals:   Blood pressure 124/76, pulse 70, temperature (!) 96.1 °F (35.6 °C), temperature source Temporal, resp. rate 18, height 5' 7\" (1.702 m), weight 103 kg (226 lb), SpO2 98%.    Physical Exam  Vitals reviewed.   Constitutional:       General: She is not in acute distress.     Appearance: She is obese. She is not ill-appearing or toxic-appearing.   HENT:      Head: Normocephalic.      Nose: No congestion.      Mouth/Throat:      Mouth: Mucous membranes are moist.   Eyes:      Pupils: Pupils are equal, round, and reactive to light.   Cardiovascular:      Rate and Rhythm: Normal rate.   Pulmonary:      Effort: Pulmonary effort is normal. No respiratory distress.   Abdominal:      General: There is no distension.      Palpations: Abdomen is soft. There is no mass.      Tenderness: There is no abdominal tenderness. There is no guarding or rebound.   Musculoskeletal:         General: Normal range of motion.      Cervical back: Normal range of motion and neck supple.   Lymphadenopathy:      Cervical: No cervical adenopathy.   Skin:     General: Skin is warm and dry.      Capillary Refill: Capillary refill takes less than 2 seconds.      Coloration: Skin is not jaundiced.   Neurological:      General: No focal deficit present.      Mental Status: She is alert.   Psychiatric:         Mood and Affect: Mood " normal.         Signature:  Kary Santana MD  Date: 2/28/2025 Time: 9:39 AM

## 2025-02-28 NOTE — ANESTHESIA POSTPROCEDURE EVALUATION
Post-Op Assessment Note    CV Status:  Stable  Pain Score: 0    Pain management: adequate       Mental Status:  Alert and awake   Hydration Status:  Euvolemic   PONV Controlled:  Controlled   Airway Patency:  Patent     Post Op Vitals Reviewed: Yes    No anethesia notable event occurred.    Staff: CRNA           Last Filed PACU Vitals:  Vitals Value Taken Time   Temp 98    Pulse 85 02/28/25 1500   /104 02/28/25 1456   Resp 19 02/28/25 1500   SpO2 99 % 02/28/25 1500   Vitals shown include unfiled device data.

## 2025-02-28 NOTE — ANESTHESIA PREPROCEDURE EVALUATION
Procedure:  CHOLECYSTECTOMY LAPAROSCOPIC, POSSIBLE OPEN, POSSIBLE CHOLANGIOGRAM (Abdomen)    Relevant Problems   ENDO   (+) Subclinical hypothyroidism      GI/HEPATIC   (+) Esophageal reflux   (+) Hiatal hernia   (+) Steatosis of liver      MUSCULOSKELETAL   (+) Hiatal hernia      Other   (+) Class 2 obesity due to excess calories without serious comorbidity with body mass index (BMI) of 39.0 to 39.9 in adult        Physical Exam    Airway    Mallampati score: II         Dental       Cardiovascular      Pulmonary      Other Findings  post-pubertal.      Anesthesia Plan  ASA Score- 3     Anesthesia Type- general with ASA Monitors.         Additional Monitors:     Airway Plan: ETT.           Plan Factors-    Chart reviewed.                      Induction- intravenous.    Postoperative Plan-         Informed Consent- Anesthetic plan and risks discussed with patient.  I personally reviewed this patient with the CRNA. Discussed and agreed on the Anesthesia Plan with the CRNA..      NPO Status:  Vitals Value Taken Time   Date of last liquid 02/27/25 02/28/25 1142   Time of last liquid 2330 02/28/25 1142   Date of last solid 02/27/25 02/28/25 1142   Time of last solid 2100 02/28/25 1142

## 2025-02-28 NOTE — OP NOTE
OPERATIVE REPORT  PATIENT NAME: Sara Harrell    :  1965  MRN: [id number]  Pt Location: [facility] OR ROOM 02    SURGERY DATE: 2025    Surgeons and Role:     * Kary Santana MD - Primary     * Juliane Aragon PA-C    Preop Diagnosis:  Calculus of gallbladder without cholecystitis without obstruction [K80.20]    Post-Op Diagnosis Codes:     * Calculus of gallbladder without cholecystitis without obstruction [K80.20]    Procedure(s):  CHOLECYSTECTOMY LAPAROSCOPIC    Specimen(s):  ID Type Source Tests Collected by Time Destination   1 :  Tissue Gallbladder TISSUE EXAM Kary Santana MD 2025 1329      Estimated Blood Loss:   50cc    Drains:  [REMOVED] NG/OG/Enteral Tube Orogastric 16 Fr Center mouth (Removed)   Number of days: 0     Anesthesia Type:   General  Local    Operative Indications:  Calculus of gallbladder without cholecystitis without obstruction [K80.20]      Operative Findings:  -Adhesions to the gallbladder  -Hepatomegaly  -Hepatic steatosis  -Elongated, redundant, intrahepatic gallbladder  -Innumerable stones within the gallbladder and at the gallbladder cystic duct junction  -Critical view of safety obtained  -Two cystic artery branches separately entering the gallbladder, controlled separately  -Some stones and bile spillage, aspirated and irrigated until aspirate clear    Complications:   None    Procedure and Technique:  The patient was taken to the operating room where she was properly identified, monitored and anesthetized. She received antibiotics and heparin prophylaxis perioperatively. Venodyne's were placed prior to the induction of anesthesia for DVT prophylaxis. The abdomen prepped and draped under sterile conditions using aseptic technique. Timeout performed. Skin incised at the umbilicus. Dissection carried down to fascia which was elevated between Kocher clamps and sharply incised. Peritoneal cavity entered bluntly with a Isa clamp. 11 mm trocar inserted and  pneumoperitoneum established to 15 mmHg. 4 quadrants of the abdomen and inspected laparoscopically and no additional pathology was identified. 3 additional working ports placed. These were 5 mm ports in the epigastrium and right upper quadrant. The patient placed in reverse Trendelenburg, left side down. Significant omental adhesions to the gallbladder were lysed with a combination of blunt and electrocautery dissection. Gallbladder grasped at its dome retracted cephalad and to the right. Infundibulum grasped and retracted laterally. [place] of [place] defined and skeletonized. Cystic duct dissected out circumferentially. Cystic artery branches x 2 dissected out circumferentially. Cystic duct clipped twice on the down side once on the specimen side and divided between clips. Each of the cystic artery branches was separately controlled with clips and divided with chata. Gallbladder dissected off the liver with electrocautery. This was a very difficult dissection as the gallbladder was intrahepatic and the dissection plane was not apparent. Some spillage of stones and bile occurred, this was immediately aspirated and irrigated until the aspirate was clear. There was some superficial liver lifted up during the dissection which had some oozing that was self limited. The gallbladder was placed in an Endobag and delivered through the umbilical trocar site. Pneumoperitoneum reestablished to 15 mmHg. Scope advanced and the right upper quadrant inspected. Right upper quadrant irrigated and aspirated until the aspirate was clear. Good hemostasis found. The procedure completed with Oscar Grace approach for closure of the umbilical port site with two 0-vicryl sutures via Oscar Grace approach, final figure of eight 0-vicryl placed with open approach. This was air tight. Then we proceeded to desufflate the abdomen and removed the ports. Skin closed with subcuticular 4-0 Monocryl suture. Wounds infiltrated with half percent  Marcaine. The wounds dressed. The patient extubated and taken to recovery in stable condition. I was present for the entire procedure and a physician assistant was required during the procedure for retraction, tissue handling, dissection and suturing.    Patient Disposition:  PACU       SIGNATURE: Kary Santana MD  DATE: February 28, 2025  TIME: 2:47 PM

## 2025-02-28 NOTE — PROGRESS NOTES
Consult - General Surgery   Sara Harrell 59 y.o. female MRN: [id number]  Encounter: [id number]    Assessment & Plan     59F with recent 60 pound weight loss over 1.5 years, reporting episodic abdominal pain with bloating, dyspepsia, foul smelling burps, and greasy stools. Episodes have been coming and going over time and she has gallbladder stones and sludge on her US. She has been following with GI and treated with a PPI, pepcid, bentyl without symptom improvement. I reviewed her EGD, colonoscopy, US imaging and reports. Has been on and off weight loss medication, currently on zepbound. History of GERD, SIBO, IBS.     We discussed that her symptoms and episodes are not classic for biliary colic but that there can be overlapping symptoms with biliary colic, biliary dyskinesia, SIBO, GERD, IBS. She would like to proceed with laparoscopic cholecystectomy with the hope of resolution of a significant amount of her symptoms. She knows that she will require ongoing GI follow up postoperatively and that we may find out by removing her gallbladder that it was a significant contributor to her symptoms. We may also find out that the majority of her symptoms remain and that the gallbladder was not driving her symptoms. She is aware of this and would like to proceed.    Informed consent obtained for laparoscopic, possible open cholecystectomy, possible cholangiogram. Discussed postoperative course, complications including bleeding, infection, bile leak, bile duct injury, retained stones, need for further procedures. Preoperative labs and EKG to be obtained. Will need to hold zepbound prior to OR.    History of Present Illness   Chief Complaint   Patient presents with    Consult     Patient is coming into the office for gallstones states that this has been giving her trouble for 5yrs. Having a lot of loose stools and gas.     5 years of intermittent symptoms which include abdominal pain, fecal urgency, fatty stools, greasy  stools, week long episodes with cramps and loose stools, had a rifaximin trial, did not feel better, had recent EGD colonoscopy, has GERD, on PPI, H2B, bentyl, on and off weight loss medicine including ozempic. She takes PPI BID, pepcid BID, bentyl premeals, PRN zofran and lomotil, elavil. Does not feel bibi her symptoms are controlled. Prior laparoscopic hysterectomy, prior csections x 2, prior open appy. Has had 60 pound weight loss in 1.5 years. Mother had colon cancer in her 80's, brother kidney cancer 60's, great great grandmother gastric cancer. Ruled out for IBD/celiac.     US reviewed shows gallstones with sludge, no inflammatory change. Counseled by GI that cholecystectomy would be helpful to pursue as part of their efforts to address her symptoms.        Review of Systems   Constitutional:  Positive for appetite change. Negative for fever.   Gastrointestinal:  Positive for abdominal distention, abdominal pain and diarrhea.   All other systems reviewed and are negative.      Historical Information   Past Medical History:   Diagnosis Date    Allergic     Asthma due to seasonal allergies     Bronchitis     GERD (gastroesophageal reflux disease)     Kidney stone     Pneumonia     PONV (postoperative nausea and vomiting)     Urinary tract infection      Past Surgical History:   Procedure Laterality Date    ABDOMINAL SURGERY  1995    Csection    APPENDECTOMY  10/1982     SECTION      two-  &     COLONOSCOPY      EGD      HYSTERECTOMY      partial-    JOINT REPLACEMENT      REPLACEMENT TOTAL KNEE BILATERAL      right- , left-     TONSILLECTOMY      WISDOM TOOTH EXTRACTION       Social History   Social History     Substance and Sexual Activity   Alcohol Use Yes    Alcohol/week: 4.0 standard drinks of alcohol    Types: 2 Glasses of wine, 2 Shots of liquor per week    Comment: occasional     Social History     Substance and Sexual Activity   Drug Use No     Social History     Tobacco  Use   Smoking Status Never   Smokeless Tobacco Never     Family History   Problem Relation Age of Onset    Heart disease Father     Hypertension Father     Urolithiasis Mother     Colon cancer Mother     Arthritis Mother     Cancer Mother         Colon cancer    Hearing loss Mother     Kidney cancer Brother     Urolithiasis Brother     Hepatitis Brother         hep c    Kidney disease Brother     Urolithiasis Sister     Diabetes Sister     Kidney disease Sister     Colon polyps Sister     Urolithiasis Daughter        Meds/Allergies     Current Outpatient Medications:     amitriptyline (ELAVIL) 10 mg tablet, Take 1 tablet (10 mg total) by mouth daily at bedtime, Disp: 45 tablet, Rfl: 0    cetirizine (ZyrTEC) 10 mg tablet, Take 10 mg by mouth, Disp: , Rfl:     clotrimazole-betamethasone (LOTRISONE) 1-0.05 % cream, Apply topically in the morning, Disp: , Rfl:     dicyclomine (BENTYL) 20 mg tablet, Take 1 tablet (20 mg total) by mouth 3 (three) times a day (Patient taking differently: Take 20 mg by mouth 3 (three) times a day as needed), Disp: 90 tablet, Rfl: 1    diphenoxylate-atropine (LOMOTIL) 2.5-0.025 mg per tablet, Take 1 tablet by mouth 4 (four) times a day as needed for diarrhea, Disp: 45 tablet, Rfl: 1    famotidine (PEPCID) 20 mg tablet, Take 1 tablet (20 mg total) by mouth 2 (two) times a day (Patient taking differently: Take 20 mg by mouth 2 (two) times a day as needed), Disp: 60 tablet, Rfl: 3    omeprazole (PriLOSEC) 40 MG capsule, Take 1 capsule (40 mg total) by mouth daily Take 1 PO QD in AM prior to a meal., Disp: 90 capsule, Rfl: 1    ondansetron (ZOFRAN) 4 mg tablet, Take 1 tablet (4 mg total) by mouth every 8 (eight) hours as needed for nausea or vomiting, Disp: 20 tablet, Rfl: 1    tirzepatide (Zepbound) 2.5 mg/0.5 mL auto-injector, Inject 0.5 mL (2.5 mg total) under the skin once a week for 28 days (Patient not taking: Reported on 2/19/2025), Disp: 2 mL, Rfl: 0    VITAMIN D PO, Take by mouth every  "other day, Disp: , Rfl:   Allergies   Allergen Reactions    Prednisone Other (See Comments)     Headaches and blood pressure spikes      Sulfa Antibiotics Hives    Vicodin [Hydrocodone-Acetaminophen] Anxiety    Codeine Hives    Colistin      Other reaction(s): Other (See Comments)  colymycin otic    Other Itching     Other reaction(s): Other (See Comments)  burn       The following portions of the patient's history were reviewed and updated as appropriate: allergies, current medications, past family history, past medical history, past social history, past surgical history, and problem list.    Objective   Current Vitals:   Blood pressure 124/76, pulse 70, temperature (!) 96.1 °F (35.6 °C), temperature source Temporal, resp. rate 18, height 5' 7\" (1.702 m), weight 103 kg (226 lb), SpO2 98%.    Physical Exam  Vitals reviewed.   Constitutional:       General: She is not in acute distress.     Appearance: She is obese. She is not ill-appearing or toxic-appearing.   HENT:      Head: Normocephalic.      Nose: No congestion.      Mouth/Throat:      Mouth: Mucous membranes are moist.   Eyes:      Pupils: Pupils are equal, round, and reactive to light.   Cardiovascular:      Rate and Rhythm: Normal rate.   Pulmonary:      Effort: Pulmonary effort is normal. No respiratory distress.   Abdominal:      General: There is no distension.      Palpations: Abdomen is soft. There is no mass.      Tenderness: There is no abdominal tenderness. There is no guarding or rebound.   Musculoskeletal:         General: Normal range of motion.      Cervical back: Normal range of motion and neck supple.   Lymphadenopathy:      Cervical: No cervical adenopathy.   Skin:     General: Skin is warm and dry.      Capillary Refill: Capillary refill takes less than 2 seconds.      Coloration: Skin is not jaundiced.   Neurological:      General: No focal deficit present.      Mental Status: She is alert.   Psychiatric:         Mood and Affect: Mood " normal.         Signature:  Kary Santana MD  Date: 2/28/2025 Time: 9:39 AM

## 2025-02-28 NOTE — DISCHARGE INSTR - AVS FIRST PAGE
DISCHARGE INSTRUCTIONS:   Light activity   No heavy lifting, limit lifting to 15-20 pounds for 2 weeks   No limitations for diet   Please take medications as prescribed   Please take acetaminophen/ibuprofen scheduled every 4-6 hours for pain  Please take narcotic pain medication as needed for severe pain   Do not drive if taking narcotic pain medication     Please remove your dressing on Sunday, March 2nd. You may shower starting March 2nd after you remove the dressing. Let water and soap run over your incisions. Do not scrub. Shower daily thereafter. You have glue over your incisions, this will fall off on its own. Alternate taking ibuprofen and tylenol for pain. You may take the oxycodone as needed for breakthrough pain.     Please notify general surgery office if you experience:  Fever over 101.5F  Persistent nausea or vomiting  Severe uncontrolled pain  Redness, tenderness, or signs of infection near incisions (pain, swelling, redness, yellow/green drainage)  Active or persistent bleeding from incisions  Chest pain, shortness of breath     Please call our office with any additional questions or concerns

## 2025-03-03 NOTE — ANESTHESIA POSTPROCEDURE EVALUATION
Post-Op Assessment Note    CV Status:  Stable    Pain management: adequate       Mental Status:  Alert and awake   Hydration Status:  Euvolemic   PONV Controlled:  Controlled   Airway Patency:  Patent     Post Op Vitals Reviewed: Yes    No anethesia notable event occurred.    Staff: Anesthesiologist           Last Filed PACU Vitals:  Vitals Value Taken Time   Temp 98.8 °F (37.1 °C) 02/28/25 1452   Pulse 79 02/28/25 1552   /74 02/28/25 1550   Resp 15 02/28/25 1552   SpO2 99 % 02/28/25 1552   Vitals shown include unfiled device data.    Modified Huy:     Vitals Value Taken Time   Activity 2 02/28/25 1532   Respiration 2 02/28/25 1532   Circulation 2 02/28/25 1532   Consciousness 1 02/28/25 1532   Oxygen Saturation 1 02/28/25 1532     Modified Huy Score: 8

## 2025-03-07 ENCOUNTER — RESULTS FOLLOW-UP (OUTPATIENT)
Dept: SURGERY | Facility: CLINIC | Age: 60
End: 2025-03-07

## 2025-03-07 PROCEDURE — 88304 TISSUE EXAM BY PATHOLOGIST: CPT | Performed by: PATHOLOGY

## 2025-03-07 NOTE — RESULT ENCOUNTER NOTE
Pathology with signs of prior gallbladder inflammation, no cancer, this is great news, please check in on her recovery and confirm her postop visit.

## 2025-03-17 ENCOUNTER — OFFICE VISIT (OUTPATIENT)
Age: 60
End: 2025-03-17

## 2025-03-17 VITALS
BODY MASS INDEX: 34.46 KG/M2 | RESPIRATION RATE: 18 BRPM | TEMPERATURE: 97.7 F | WEIGHT: 220 LBS | OXYGEN SATURATION: 98 % | HEART RATE: 74 BPM

## 2025-03-17 DIAGNOSIS — E66.812 CLASS 2 SEVERE OBESITY WITH SERIOUS COMORBIDITY AND BODY MASS INDEX (BMI) OF 35.0 TO 35.9 IN ADULT, UNSPECIFIED OBESITY TYPE (HCC): Primary | ICD-10-CM

## 2025-03-17 DIAGNOSIS — E66.01 CLASS 2 SEVERE OBESITY WITH SERIOUS COMORBIDITY AND BODY MASS INDEX (BMI) OF 35.0 TO 35.9 IN ADULT, UNSPECIFIED OBESITY TYPE (HCC): Primary | ICD-10-CM

## 2025-03-17 DIAGNOSIS — K58.0 IRRITABLE BOWEL SYNDROME WITH DIARRHEA: ICD-10-CM

## 2025-03-17 DIAGNOSIS — Z90.49 S/P LAPAROSCOPIC CHOLECYSTECTOMY: Primary | ICD-10-CM

## 2025-03-17 PROCEDURE — 99024 POSTOP FOLLOW-UP VISIT: CPT | Performed by: SURGERY

## 2025-03-18 RX ORDER — TIRZEPATIDE 2.5 MG/.5ML
2.5 INJECTION, SOLUTION SUBCUTANEOUS WEEKLY
COMMUNITY
End: 2025-03-18 | Stop reason: SDUPTHER

## 2025-03-18 RX ORDER — TIRZEPATIDE 2.5 MG/.5ML
2.5 INJECTION, SOLUTION SUBCUTANEOUS WEEKLY
Qty: 4 ML | Refills: 0 | Status: SHIPPED | OUTPATIENT
Start: 2025-03-18

## 2025-03-18 RX ORDER — AMITRIPTYLINE HYDROCHLORIDE 10 MG/1
10 TABLET ORAL
Qty: 45 TABLET | Refills: 0 | Status: SHIPPED | OUTPATIENT
Start: 2025-03-18

## 2025-03-18 NOTE — TELEPHONE ENCOUNTER
Patient asking for a refill on zepbound to [facility]. Patient's most recent weight is 223lbs. Patient is also asking for elavil refill to [facility]. Patient asking if she can get more refills to hold her over until she sees you in June instead of having to keep calling monthly. Please advise.

## 2025-03-23 PROBLEM — Z90.49 S/P LAPAROSCOPIC CHOLECYSTECTOMY: Status: ACTIVE | Noted: 2025-03-23

## 2025-03-23 PROBLEM — K80.20 CALCULUS OF GALLBLADDER WITHOUT CHOLECYSTITIS WITHOUT OBSTRUCTION: Status: RESOLVED | Noted: 2025-02-28 | Resolved: 2025-03-23

## 2025-03-23 NOTE — PROGRESS NOTES
Post-Op Note - General Surgery   Sara Harrell 59 y.o. female MRN: [id number]  Encounter: [id number]    Assessment & Plan     59F with recent 60 pound weight loss over 1.5 years, reporting episodic abdominal pain with bloating, dyspepsia, foul smelling burps, and greasy stools. Episodes have been coming and going over time and she has gallbladder stones and sludge on her US. She has been following with GI and treated with a PPI, pepcid, bentyl without symptom improvement. I reviewed her EGD, colonoscopy, US imaging and reports. Has been on and off weight loss medication, currently on zepbound. History of GERD, SIBO, IBS.      She is now 3 weeks status post laparoscopic cholecystectomy, pathology showed chronic cholecystitis and stones, I anticipate that she will have symptom relief based on the intraoperative appearance of the gallbladder. She is recovering well. No signs of wound infection. She did have a reaction to the skin glue which I added to her allergy list. I advised ongoing PCP and GI follow up. PRN surgical follow up. She will reach out as needed with any concerns.    Subjective      Chief Complaint   Patient presents with    Post-op     post op lap joselin 2/28     Doing well, had a reaction to skin glue which was a rash and significant itching, now resolved, added to allergy list. Pathology showed chronic cholecystitis. Recovering well.       Review of Systems   Constitutional:  Positive for activity change. Negative for fever.   Gastrointestinal: Negative.    Skin:  Positive for wound. Negative for color change and rash.   All other systems reviewed and are negative.      The following portions of the patient's history were reviewed and updated as appropriate: allergies, current medications, past family history, past medical history, past social history, past surgical history, and problem list.    Objective      Pulse 74, temperature 97.7 °F (36.5 °C), temperature source Temporal, resp. rate 18,  weight 99.8 kg (220 lb), SpO2 98%.   Physical Exam  Vitals reviewed.   Constitutional:       General: She is not in acute distress.     Appearance: She is not toxic-appearing.   HENT:      Head: Normocephalic.      Nose: No congestion.      Mouth/Throat:      Mouth: Mucous membranes are moist.   Eyes:      Pupils: Pupils are equal, round, and reactive to light.   Cardiovascular:      Rate and Rhythm: Normal rate.   Pulmonary:      Effort: Pulmonary effort is normal. No respiratory distress.   Abdominal:      General: There is no distension.      Palpations: Abdomen is soft.      Tenderness: There is no abdominal tenderness. There is no guarding or rebound.      Comments: Incisions healing well, no signs of infection, prior dermatitis per patient not apparent today   Musculoskeletal:         General: Normal range of motion.      Cervical back: Normal range of motion.   Skin:     General: Skin is warm.      Capillary Refill: Capillary refill takes less than 2 seconds.   Neurological:      General: No focal deficit present.      Mental Status: She is alert. Mental status is at baseline.   Psychiatric:         Mood and Affect: Mood normal.         Signature:  Kary Santana MD  Date: 3/23/2025 Time: 9:35 AM    Xelmandyz Pregnancy And Lactation Text: This medication is Pregnancy Category D and is not considered safe during pregnancy.  The risk during breast feeding is also uncertain.

## 2025-03-27 ENCOUNTER — OFFICE VISIT (OUTPATIENT)
Dept: URGENT CARE | Facility: CLINIC | Age: 60
End: 2025-03-27
Payer: COMMERCIAL

## 2025-03-27 VITALS
RESPIRATION RATE: 20 BRPM | TEMPERATURE: 97.5 F | OXYGEN SATURATION: 98 % | SYSTOLIC BLOOD PRESSURE: 140 MMHG | HEART RATE: 76 BPM | DIASTOLIC BLOOD PRESSURE: 79 MMHG

## 2025-03-27 DIAGNOSIS — J06.9 ACUTE URI: Primary | ICD-10-CM

## 2025-03-27 PROCEDURE — 99213 OFFICE O/P EST LOW 20 MIN: CPT

## 2025-03-27 RX ORDER — ALBUTEROL SULFATE 90 UG/1
2 INHALANT RESPIRATORY (INHALATION) EVERY 6 HOURS PRN
Qty: 8.5 G | Refills: 0 | Status: SHIPPED | OUTPATIENT
Start: 2025-03-27

## 2025-03-27 RX ORDER — FLUTICASONE PROPIONATE 50 MCG
1 SPRAY, SUSPENSION (ML) NASAL DAILY
Qty: 15.8 ML | Refills: 0 | Status: SHIPPED | OUTPATIENT
Start: 2025-03-27

## 2025-03-27 RX ORDER — AZITHROMYCIN 250 MG/1
TABLET, FILM COATED ORAL
Qty: 6 TABLET | Refills: 0 | Status: SHIPPED | OUTPATIENT
Start: 2025-03-27 | End: 2025-03-31

## 2025-03-27 NOTE — PATIENT INSTRUCTIONS
Please take Azithromycin daily for the next 5 days.     While sick, make sure you get lots of rest and increase your fluid intake.   You may use OTC nasal saline spray, Flonase, and Mucinex for mild congestion.   Take Tylenol or Ibuprofen for fever, mild pain, and/or body aches.  Perform salt water gargles, drink warm tea with honey, and use throat lozenges and Chloraseptic spray for sore throat.    You can also apply warm compresses over your sinuses for any sinus pressure.     While you are sick, taking vitamins may help boost your immune system and potentially aid in recovery by supporting your body's natural defense mechanisms: Vitamin D3 2000 IU daily, Vitamin C 1000mg daily , and Multivitamin daily.    If your symptoms do not improve with our current treatment plan or worsen, please schedule an appointment with your PCP. If you develop any high or persistent fevers, chest pain, palpitations, shortness of breath, difficulty swallowing, decreased urine output, abdominal pain, dizziness or any other concerning symptoms, please proceed to the ED.

## 2025-03-27 NOTE — PROGRESS NOTES
[facility]        NAME: Sara Harrell is a 59 y.o. female  : 1965    MRN: [id number]  DATE: 2025  TIME: 3:17 PM    Assessment and Plan   Acute URI [J06.9]  1. Acute URI  azithromycin (ZITHROMAX) 250 mg tablet    fluticasone (FLONASE) 50 mcg/act nasal spray    albuterol (ProAir HFA) 90 mcg/act inhaler        Discussed with patient symptoms appear to be viral in nature.  Patient has allergies to prednisone therefore will avoid steroids.  Given patient's concern for bacterial infection and upcoming weekend, will treat with azithromycin.  Also recommend OTC Flonase twice daily.  Patient currently reports she is taking Zyrtec daily. Instructed patient to follow-up with PCP for no improvement or worsening of symptoms.  Patient educated on red flag symptoms and when to proceed to the ED.  Patient agreeable and understands current treatment plan.     Patient Instructions     Patient Instructions   Please take Azithromycin daily for the next 5 days.     While sick, make sure you get lots of rest and increase your fluid intake.   You may use OTC nasal saline spray, Flonase, and Mucinex for mild congestion.   Take Tylenol or Ibuprofen for fever, mild pain, and/or body aches.  Perform salt water gargles, drink warm tea with honey, and use throat lozenges and Chloraseptic spray for sore throat.    You can also apply warm compresses over your sinuses for any sinus pressure.     While you are sick, taking vitamins may help boost your immune system and potentially aid in recovery by supporting your body's natural defense mechanisms: Vitamin D3 2000 IU daily, Vitamin C 1000mg daily , and Multivitamin daily.    If your symptoms do not improve with our current treatment plan or worsen, please schedule an appointment with your PCP. If you develop any high or persistent fevers, chest pain, palpitations, shortness of breath, difficulty swallowing, decreased urine output, abdominal pain, dizziness or any  other concerning symptoms, please proceed to the ED.          Follow up with PCP in 3-5 days.  Proceed to  ER if symptoms worsen.    Chief Complaint     Chief Complaint   Patient presents with   • Sinusitis     Woke up this morning with sinus pressure and pain on the left side of her face. Congestion. Has had green drainage. Was using Flonase and dayquil.          History of Present Illness       59-year-old female presents to the clinic for evaluation of sinus congestion x 2 days.  Patient reports her symptoms started last night before bed.  She did feel a tickle in her throat and started to have a dry cough.  She also reports some postnasal drip, runny nose, sinus pressure, sore throat headache and bodyaches.  Patient reports her symptoms are gradually worsening today.  She does report she feels a lot of sinus pressure and pain on the left side of her face.  She reports taking OTC Flonase and DayQuil with mild relief of symptoms.  She is worried because the weekend is coming and she states the symptoms normally turned into a sinus infection and bronchitis.         Sinusitis  Associated symptoms include congestion, coughing, headaches, sinus pressure and a sore throat. Pertinent negatives include no chills, ear pain or shortness of breath.       Review of Systems   Review of Systems   Constitutional:  Negative for appetite change, chills and fever.   HENT:  Positive for congestion, postnasal drip, rhinorrhea, sinus pressure and sore throat. Negative for ear pain.    Respiratory:  Positive for cough. Negative for shortness of breath and wheezing.    Cardiovascular:  Negative for chest pain and palpitations.   Gastrointestinal:  Negative for diarrhea, nausea and vomiting.   Genitourinary:  Negative for decreased urine volume.   Musculoskeletal:  Positive for arthralgias and myalgias.   Neurological:  Positive for headaches. Negative for dizziness and light-headedness.         Current Medications       Current  Outpatient Medications:   •  albuterol (ProAir HFA) 90 mcg/act inhaler, Inhale 2 puffs every 6 (six) hours as needed for wheezing, Disp: 8.5 g, Rfl: 0  •  amitriptyline (ELAVIL) 10 mg tablet, Take 1 tablet (10 mg total) by mouth daily at bedtime, Disp: 45 tablet, Rfl: 0  •  azithromycin (ZITHROMAX) 250 mg tablet, Take 2 tablets today then 1 tablet daily x 4 days, Disp: 6 tablet, Rfl: 0  •  cetirizine (ZyrTEC) 10 mg tablet, Take 10 mg by mouth, Disp: , Rfl:   •  clotrimazole-betamethasone (LOTRISONE) 1-0.05 % cream, Apply topically in the morning, Disp: , Rfl:   •  CRANBERRY PO, Take 1 capsule by mouth 3 (three) times a week, Disp: , Rfl:   •  dicyclomine (BENTYL) 20 mg tablet, Take 1 tablet (20 mg total) by mouth 3 (three) times a day, Disp: 90 tablet, Rfl: 1  •  diphenoxylate-atropine (LOMOTIL) 2.5-0.025 mg per tablet, Take 1 tablet by mouth 4 (four) times a day as needed for diarrhea, Disp: 45 tablet, Rfl: 1  •  famotidine (PEPCID) 20 mg tablet, Take 1 tablet (20 mg total) by mouth 2 (two) times a day, Disp: 60 tablet, Rfl: 3  •  fluticasone (FLONASE) 50 mcg/act nasal spray, 1 spray into each nostril daily, Disp: 15.8 mL, Rfl: 0  •  omeprazole (PriLOSEC) 40 MG capsule, Take 1 capsule (40 mg total) by mouth daily Take 1 PO QD in AM prior to a meal., Disp: 90 capsule, Rfl: 1  •  ondansetron (ZOFRAN) 4 mg tablet, Take 1 tablet (4 mg total) by mouth every 8 (eight) hours as needed for nausea or vomiting, Disp: 20 tablet, Rfl: 1  •  tirzepatide (Zepbound) 2.5 mg/0.5 mL auto-injector, Inject 0.5 mL (2.5 mg total) under the skin once a week, Disp: 4 mL, Rfl: 0  •  VITAMIN D PO, Take by mouth every other day, Disp: , Rfl:     Current Allergies     Allergies as of 03/27/2025 - Reviewed 03/27/2025   Allergen Reaction Noted   • Prednisone Other (See Comments) 07/26/2016   • Sulfa antibiotics Hives 07/26/2016   • Vicodin [hydrocodone-acetaminophen] Anxiety 07/26/2016   • Codeine Hives 07/26/2016   • Cyanoacrylate Dermatitis  2025   • Colistin     • Other Itching             The following portions of the patient's history were reviewed and updated as appropriate: allergies, current medications, past family history, past medical history, past social history, past surgical history and problem list.     Past Medical History:   Diagnosis Date   • Allergic    • Asthma due to seasonal allergies    • Bronchitis    • GERD (gastroesophageal reflux disease)    • Kidney stone    • Pneumonia    • PONV (postoperative nausea and vomiting)    • Urinary tract infection        Past Surgical History:   Procedure Laterality Date   • ABDOMINAL SURGERY  1995    Csection   • APPENDECTOMY  10/1982   •  SECTION      two-  &    • COLONOSCOPY     • EGD     • HYSTERECTOMY      partial-   • JOINT REPLACEMENT     • [place] LAPAROSCOPY SURG CHOLECYSTECTOMY N/A 2025    Procedure: CHOLECYSTECTOMY LAPAROSCOPIC, POSSIBLE OPEN, POSSIBLE CHOLANGIOGRAM;  Surgeon: Kary Santana MD;  Location: [facility];  Service: General   • REPLACEMENT TOTAL KNEE BILATERAL      right- , left-    • TONSILLECTOMY     • WISDOM TOOTH EXTRACTION         Family History   Problem Relation Age of Onset   • Heart disease Father    • Hypertension Father    • Urolithiasis Mother    • Colon cancer Mother    • Arthritis Mother    • Cancer Mother         Colon cancer   • Hearing loss Mother    • Kidney cancer Brother    • Urolithiasis Brother    • Hepatitis Brother         hep c   • Kidney disease Brother    • Urolithiasis Sister    • Diabetes Sister    • Kidney disease Sister    • Colon polyps Sister    • Urolithiasis Daughter          Medications have been verified.        Objective   /79   Pulse 76   Temp 97.5 °F (36.4 °C) (Temporal)   Resp 20   LMP  (LMP Unknown)   SpO2 98%        Physical Exam     Physical Exam  Vitals and nursing note reviewed.   Constitutional:       Appearance: Normal appearance.   HENT:      Head: Normocephalic and atraumatic.       Right Ear: Tympanic membrane, ear canal and external ear normal.      Left Ear: Tympanic membrane, ear canal and external ear normal.      Nose: Congestion and rhinorrhea present.      Right Sinus: No maxillary sinus tenderness or frontal sinus tenderness.      Left Sinus: No maxillary sinus tenderness or frontal sinus tenderness.      Mouth/Throat:      Mouth: Mucous membranes are moist.      Pharynx: Oropharynx is clear. Uvula midline. Posterior oropharyngeal erythema (mild) and postnasal drip present. No pharyngeal swelling, oropharyngeal exudate or uvula swelling.      Tonsils: No tonsillar exudate or tonsillar abscesses.   Eyes:      General:         Right eye: No discharge.         Left eye: No discharge.      Conjunctiva/sclera: Conjunctivae normal.   Cardiovascular:      Rate and Rhythm: Normal rate and regular rhythm.      Pulses: Normal pulses.      Heart sounds: Normal heart sounds.   Pulmonary:      Effort: Pulmonary effort is normal.      Breath sounds: Normal breath sounds.   Lymphadenopathy:      Cervical: No cervical adenopathy.   Skin:     General: Skin is warm and dry.   Neurological:      General: No focal deficit present.      Mental Status: She is alert.   Psychiatric:         Mood and Affect: Mood normal.         Behavior: Behavior normal.

## 2025-05-13 DIAGNOSIS — E66.812 CLASS 2 SEVERE OBESITY WITH SERIOUS COMORBIDITY AND BODY MASS INDEX (BMI) OF 35.0 TO 35.9 IN ADULT, UNSPECIFIED OBESITY TYPE (HCC): ICD-10-CM

## 2025-05-13 DIAGNOSIS — K58.0 IRRITABLE BOWEL SYNDROME WITH DIARRHEA: ICD-10-CM

## 2025-05-13 DIAGNOSIS — E66.01 CLASS 2 SEVERE OBESITY WITH SERIOUS COMORBIDITY AND BODY MASS INDEX (BMI) OF 35.0 TO 35.9 IN ADULT, UNSPECIFIED OBESITY TYPE (HCC): ICD-10-CM

## 2025-05-13 RX ORDER — TIRZEPATIDE 2.5 MG/.5ML
2.5 INJECTION, SOLUTION SUBCUTANEOUS WEEKLY
Qty: 4 ML | Refills: 0 | Status: SHIPPED | OUTPATIENT
Start: 2025-05-13

## 2025-05-13 RX ORDER — AMITRIPTYLINE HYDROCHLORIDE 10 MG/1
10 TABLET ORAL
Qty: 30 TABLET | Refills: 5 | Status: SHIPPED | OUTPATIENT
Start: 2025-05-13

## 2025-05-13 NOTE — TELEPHONE ENCOUNTER
Reason for call:   [x] Refill   [] Prior Auth  [] Other:     Office:   [x] PCP/Provider -   [] Specialty/Provider -     Medication: Tirzepatide    Dose/Frequency: 2.5 mg    Quantity: 4mL    Pharmacy: [facility]/pharmacy #[id number] - [place] - [address]      Local Pharmacy   Does the patient have enough for 3 days?   [] Yes   [x] No - Send as HP to POD    Mail Away Pharmacy   Does the patient have enough for 10 days?   [] Yes   [] No - Send as HP to POD

## 2025-05-13 NOTE — TELEPHONE ENCOUNTER
Reason for call:   [x] Refill   [] Prior Auth  [] Other:     Office:   [x] PCP/Provider -   [] Specialty/Provider -     Medication: Amitriptyline    Dose/Frequency: 10 mg    Quantity: 45 tablet    Pharmacy: [facility] - [place] - [address] [place] [address] Pharmacy   Does the patient have enough for 3 days?   [x] Yes   [] No - Send as HP to POD    Mail Away Pharmacy   Does the patient have enough for 10 days?   [] Yes   [] No - Send as HP to POD

## 2025-05-19 ENCOUNTER — APPOINTMENT (EMERGENCY)
Dept: CT IMAGING | Facility: HOSPITAL | Age: 60
End: 2025-05-19
Payer: COMMERCIAL

## 2025-05-19 ENCOUNTER — HOSPITAL ENCOUNTER (EMERGENCY)
Facility: HOSPITAL | Age: 60
Discharge: HOME | End: 2025-05-19
Attending: EMERGENCY MEDICINE
Payer: COMMERCIAL

## 2025-05-19 ENCOUNTER — OFFICE VISIT (OUTPATIENT)
Dept: FAMILY MEDICINE CLINIC | Facility: CLINIC | Age: 60
End: 2025-05-19
Payer: COMMERCIAL

## 2025-05-19 VITALS
OXYGEN SATURATION: 98 % | BODY MASS INDEX: 36.1 KG/M2 | HEART RATE: 62 BPM | HEIGHT: 67 IN | DIASTOLIC BLOOD PRESSURE: 88 MMHG | SYSTOLIC BLOOD PRESSURE: 134 MMHG | WEIGHT: 230 LBS

## 2025-05-19 VITALS
HEART RATE: 66 BPM | SYSTOLIC BLOOD PRESSURE: 135 MMHG | OXYGEN SATURATION: 97 % | TEMPERATURE: 97.6 F | DIASTOLIC BLOOD PRESSURE: 62 MMHG | RESPIRATION RATE: 18 BRPM

## 2025-05-19 DIAGNOSIS — R10.9 FLANK PAIN: ICD-10-CM

## 2025-05-19 DIAGNOSIS — D17.71 ANGIOMYOLIPOMA OF LEFT KIDNEY: Primary | ICD-10-CM

## 2025-05-19 DIAGNOSIS — M54.9 BACK PAIN: ICD-10-CM

## 2025-05-19 DIAGNOSIS — R10.9 FLANK PAIN: Primary | ICD-10-CM

## 2025-05-19 LAB
ALBUMIN SERPL BCG-MCNC: 4.4 G/DL (ref 3.5–5)
ALP SERPL-CCNC: 67 U/L (ref 34–104)
ALT SERPL W P-5'-P-CCNC: 23 U/L (ref 7–52)
ANION GAP SERPL CALCULATED.3IONS-SCNC: 6 MMOL/L (ref 4–13)
AST SERPL W P-5'-P-CCNC: 15 U/L (ref 13–39)
BASOPHILS # BLD AUTO: 0.05 THOUSANDS/ÂΜL (ref 0–0.1)
BASOPHILS NFR BLD AUTO: 1 % (ref 0–1)
BILIRUB SERPL-MCNC: 0.52 MG/DL (ref 0.2–1)
BUN SERPL-MCNC: 16 MG/DL (ref 5–25)
CALCIUM SERPL-MCNC: 9.3 MG/DL (ref 8.4–10.2)
CHLORIDE SERPL-SCNC: 105 MMOL/L (ref 96–108)
CO2 SERPL-SCNC: 28 MMOL/L (ref 21–32)
CREAT SERPL-MCNC: 0.72 MG/DL (ref 0.6–1.3)
D DIMER PPP FEU-MCNC: <0.27 UG/ML FEU
EOSINOPHIL # BLD AUTO: 0.15 THOUSAND/ÂΜL (ref 0–0.61)
EOSINOPHIL NFR BLD AUTO: 2 % (ref 0–6)
ERYTHROCYTE [DISTWIDTH] IN BLOOD BY AUTOMATED COUNT: 14.1 % (ref 11.6–15.1)
GFR SERPL CREATININE-BSD FRML MDRD: 91 ML/MIN/1.73SQ M
GLUCOSE SERPL-MCNC: 97 MG/DL (ref 65–140)
HCT VFR BLD AUTO: 46.6 % (ref 34.8–46.1)
HGB BLD-MCNC: 14.5 G/DL (ref 11.5–15.4)
IMM GRANULOCYTES # BLD AUTO: 0.02 THOUSAND/UL (ref 0–0.2)
IMM GRANULOCYTES NFR BLD AUTO: 0 % (ref 0–2)
LIPASE SERPL-CCNC: 17 U/L (ref 11–82)
LYMPHOCYTES # BLD AUTO: 1.87 THOUSANDS/ÂΜL (ref 0.6–4.47)
LYMPHOCYTES NFR BLD AUTO: 30 % (ref 14–44)
MCH RBC QN AUTO: 27.2 PG (ref 26.8–34.3)
MCHC RBC AUTO-ENTMCNC: 31.1 G/DL (ref 31.4–37.4)
MCV RBC AUTO: 87 FL (ref 82–98)
MONOCYTES # BLD AUTO: 0.44 THOUSAND/ÂΜL (ref 0.17–1.22)
MONOCYTES NFR BLD AUTO: 7 % (ref 4–12)
NEUTROPHILS # BLD AUTO: 3.81 THOUSANDS/ÂΜL (ref 1.85–7.62)
NEUTS SEG NFR BLD AUTO: 60 % (ref 43–75)
NRBC BLD AUTO-RTO: 0 /100 WBCS
PLATELET # BLD AUTO: 291 THOUSANDS/UL (ref 149–390)
PMV BLD AUTO: 9.9 FL (ref 8.9–12.7)
POTASSIUM SERPL-SCNC: 4.2 MMOL/L (ref 3.5–5.3)
PROT SERPL-MCNC: 6.7 G/DL (ref 6.4–8.4)
RBC # BLD AUTO: 5.34 MILLION/UL (ref 3.81–5.12)
SL AMB  POCT GLUCOSE, UA: ABNORMAL
SL AMB LEUKOCYTE ESTERASE,UA: ABNORMAL
SL AMB POCT BILIRUBIN,UA: ABNORMAL
SL AMB POCT BLOOD,UA: ABNORMAL
SL AMB POCT CLARITY,UA: CLEAR
SL AMB POCT COLOR,UA: YELLOW
SL AMB POCT KETONES,UA: ABNORMAL
SL AMB POCT NITRITE,UA: ABNORMAL
SL AMB POCT PH,UA: 6
SL AMB POCT SPECIFIC GRAVITY,UA: 1.01
SL AMB POCT URINE PROTEIN: ABNORMAL
SL AMB POCT UROBILINOGEN: 0.2
SODIUM SERPL-SCNC: 139 MMOL/L (ref 135–147)
WBC # BLD AUTO: 6.34 THOUSAND/UL (ref 4.31–10.16)

## 2025-05-19 PROCEDURE — 85025 COMPLETE CBC W/AUTO DIFF WBC: CPT | Performed by: EMERGENCY MEDICINE

## 2025-05-19 PROCEDURE — 96372 THER/PROPH/DIAG INJ SC/IM: CPT | Performed by: NURSE PRACTITIONER

## 2025-05-19 PROCEDURE — 87086 URINE CULTURE/COLONY COUNT: CPT | Performed by: NURSE PRACTITIONER

## 2025-05-19 PROCEDURE — 83690 ASSAY OF LIPASE: CPT | Performed by: EMERGENCY MEDICINE

## 2025-05-19 PROCEDURE — 99284 EMERGENCY DEPT VISIT MOD MDM: CPT

## 2025-05-19 PROCEDURE — 36415 COLL VENOUS BLD VENIPUNCTURE: CPT | Performed by: EMERGENCY MEDICINE

## 2025-05-19 PROCEDURE — 99285 EMERGENCY DEPT VISIT HI MDM: CPT | Performed by: EMERGENCY MEDICINE

## 2025-05-19 PROCEDURE — 96374 THER/PROPH/DIAG INJ IV PUSH: CPT

## 2025-05-19 PROCEDURE — 80053 COMPREHEN METABOLIC PANEL: CPT | Performed by: EMERGENCY MEDICINE

## 2025-05-19 PROCEDURE — 99213 OFFICE O/P EST LOW 20 MIN: CPT | Performed by: NURSE PRACTITIONER

## 2025-05-19 PROCEDURE — 96376 TX/PRO/DX INJ SAME DRUG ADON: CPT

## 2025-05-19 PROCEDURE — 74177 CT ABD & PELVIS W/CONTRAST: CPT

## 2025-05-19 PROCEDURE — 85379 FIBRIN DEGRADATION QUANT: CPT | Performed by: EMERGENCY MEDICINE

## 2025-05-19 PROCEDURE — 96361 HYDRATE IV INFUSION ADD-ON: CPT

## 2025-05-19 PROCEDURE — 81002 URINALYSIS NONAUTO W/O SCOPE: CPT | Performed by: NURSE PRACTITIONER

## 2025-05-19 RX ORDER — KETOROLAC TROMETHAMINE 30 MG/ML
60 INJECTION, SOLUTION INTRAMUSCULAR; INTRAVENOUS ONCE
Status: COMPLETED | OUTPATIENT
Start: 2025-05-19 | End: 2025-05-19

## 2025-05-19 RX ORDER — FENTANYL CITRATE 50 UG/ML
50 INJECTION, SOLUTION INTRAMUSCULAR; INTRAVENOUS ONCE
Refills: 0 | Status: COMPLETED | OUTPATIENT
Start: 2025-05-19 | End: 2025-05-19

## 2025-05-19 RX ORDER — NAPROXEN 500 MG/1
500 TABLET ORAL 2 TIMES DAILY WITH MEALS
Qty: 10 TABLET | Refills: 0 | Status: SHIPPED | OUTPATIENT
Start: 2025-05-19 | End: 2025-05-24

## 2025-05-19 RX ORDER — METHOCARBAMOL 500 MG/1
500 TABLET, FILM COATED ORAL 2 TIMES DAILY
Qty: 10 TABLET | Refills: 0 | Status: SHIPPED | OUTPATIENT
Start: 2025-05-19 | End: 2025-05-24

## 2025-05-19 RX ADMIN — FENTANYL CITRATE 50 MCG: 50 INJECTION INTRAMUSCULAR; INTRAVENOUS at 12:10

## 2025-05-19 RX ADMIN — FENTANYL CITRATE 50 MCG: 50 INJECTION INTRAMUSCULAR; INTRAVENOUS at 14:45

## 2025-05-19 RX ADMIN — SODIUM CHLORIDE 500 ML: 0.9 INJECTION, SOLUTION INTRAVENOUS at 12:13

## 2025-05-19 RX ADMIN — KETOROLAC TROMETHAMINE 60 MG: 30 INJECTION, SOLUTION INTRAMUSCULAR; INTRAVENOUS at 10:52

## 2025-05-19 RX ADMIN — IOHEXOL 100 ML: 350 INJECTION, SOLUTION INTRAVENOUS at 13:10

## 2025-05-19 NOTE — PROGRESS NOTES
"Name: Sara Harrell      : 1965      MRN: [id number]  Encounter Provider: JAMES Morales  Encounter Date: 2025   Encounter department: [facility]  :  Assessment & Plan  Flank pain  Referred to Er , report messaged Dr Mccord  Orders:  •  ketorolac (TORADOL) 60 mg/2 mL IM injection 60 mg  •  POCT urine dip  •  Urine culture; Future           History of Present Illness   Patient has had some back pain over the last couple of days and as she did several physical activities that seem to get worse.  Was no specific injury but she did go to a wedding yesterday and then she was washing her car and she felt like it was bad or worse after those.  Fever or chills.  No change in urine elimination but I did ask if there was a history of renal calculi and she said that yes it was and \"it did kind of feel like this \"\".  She tried Aleve PM she tried muscle relaxer and Lidoderm patch and nothing helped.    Back Pain      Review of Systems   Musculoskeletal:  Positive for back pain.       Objective   /88 (BP Location: Left arm, Patient Position: Sitting, Cuff Size: Extra-Large)   Pulse 62   Ht 5' 7\" (1.702 m)   Wt 104 kg (230 lb)   LMP  (LMP Unknown)   SpO2 98%   BMI 36.02 kg/m²      Physical Exam  Constitutional:       Comments: Appears quite uncomfortable she is pacing in the room     Cardiovascular:      Rate and Rhythm: Normal rate and regular rhythm.   Pulmonary:      Effort: Pulmonary effort is normal.      Breath sounds: Normal breath sounds.     Musculoskeletal:         General: Normal range of motion.      Comments: No rash on back.  No tenderness with palpation of lower thoracic or lumbar spine.  She does have some left CVA tenderness with percussion and palpation.  And some point discomfort at the paravertebral muscle around T12-L1 and 2 area     Psychiatric:      Comments: Uncomfortable so difficult the joint         "

## 2025-05-19 NOTE — DISCHARGE INSTRUCTIONS
Your CT SHOWED: 1 cm lesion with macroscopic fat in the upper pole left kidney suggest angiomyolipoma.  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

## 2025-05-19 NOTE — INCIDENTAL FINDINGS
The following findings require follow up:  Radiographic finding   Findin cm lesion with macroscopic fat in the upper pole left kidney suggest angiomyolipoma    Follow up required: YES   Follow up should be done within 3 month(s)    Please notify the following clinician to assist with the follow up:   Urology: Dr. Osorio    Incidental finding results were discussed with the Patient by Mark Calvert III, DO on 25.   They expressed understanding and all questions answered.

## 2025-05-19 NOTE — ED PROVIDER NOTES
Time reflects when diagnosis was documented in both MDM as applicable and the Disposition within this note       Time User Action Codes Description Comment    5/19/2025  3:19 PM CalvertMark colon Add [D17.71] Angiomyolipoma of right kidney     5/19/2025  3:19 PM CalvertMark colon Remove [D17.71] Angiomyolipoma of right kidney     5/19/2025  3:19 PM CalvertMark colon Add [D17.71] Angiomyolipoma of left kidney     5/19/2025  3:25 PM CalvertLukasz colonald Add [R10.9] Flank pain     5/19/2025  3:32 PM CalvertLukasz colonald Add [M54.9] Back pain           ED Disposition       ED Disposition   Discharge    Condition   Stable    Date/Time   Mon May 19, 2025  3:25 PM    Comment   Sara Harrell discharge to home/self care.                   Assessment & Plan       Medical Decision Making  Amount and/or Complexity of Data Reviewed  Labs: ordered.  Radiology: ordered.    Risk  Prescription drug management.      Left-sided back pain without a presence of a rash workup for renal colic negative, there was a mild component pleuritic component to the patient's presentation, D-dimer negative.  After IV pain medicine patient was able to ambulate without difficulty, incidental findings reviewed with patient at the bedside, the questionable pancolitis with no fever or leukocytosis will need to be evaluated by gastroenterology as an outpatient. 1 cm lesion with macroscopic fat in the upper pole left kidney suggest angiomyolipoma, need to follow-up with urology.  Ambulatory referral made on the patient's behalf for [facility].    No red flag back pain signs/symptoms on exam:  Pain improved with rest,  Pain relieved by lying flat, worse with bending/twisthing.  No swelling to low back or lower extremities, no pulsations in leg or thigh.   No severe weakness or loss of sensation to low back, lower extremities, perineum, or genitals. No fevers, chills, nights sweats or unexplained weight loss.  No change in color of the skin over the legs  "or feet (vascular insufficiency) or unhealing lower extremity wounds.  No partial or total loss of bladder and/or bowel control, no difficulty in passing urine or having a bowel movement, no hematuria.  Pain not severe or intractable in ED.      Portions of the record may have been created with voice recognition software. Occasional wrong word or \"sound a like\" substitutions may have occurred due to the inherent limitations of voice recognition software. Read the chart carefully and recognize, using context, where substitutions have occurred.     ED Course as of 05/19/25 1706   Mon May 19, 2025   1154 Patient seen, evaluated, examined, chart reviewed, abrupt onset of left-sided flank pain.  Radiation to the inner thigh, no dysuria, no frequency of urination, no fever, no chills, no rash.  At urgent care showed trace blood with no evidence of infection    Focused differential diagnosis in this patient is as follows: Renal colic versus musculoskeletal back pain versus lumbar radiculopathy.   1538 CT imaging with the patient and the  at the bedside, patient has 2 incidental findings that were reviewed: Angiomyolipoma and pancolitis, patient not have any focal abdominal pain, patient has had a EGD and colonoscopy recently which showed no pathology, advised patient follow-up with GI, ambulatory referral to the [facility] follow-up with her PCP Dr. Saleem Hill and follow-up with urology for the angiomyolipoma in the L kidney.       Medications   sodium chloride 0.9 % bolus 500 mL (0 mL Intravenous Stopped 5/19/25 1538)   fentaNYL injection 50 mcg (50 mcg Intravenous Given 5/19/25 1210)   iohexol (OMNIPAQUE) 350 MG/ML injection (MULTI-DOSE) 100 mL (100 mL Intravenous Given 5/19/25 1310)   fentaNYL injection 50 mcg (50 mcg Intravenous Given 5/19/25 1445)       ED Risk Strat Scores                    No data recorded    PERC Rule for PE      Flowsheet Row Most Recent Value   PERC Rule for PE    Age >=50 " 1 Filed at: 05/19/2025 1704   HR >=100 0 Filed at: 05/19/2025 1704   O2 Sat on room air < 95% 0 Filed at: 05/19/2025 1704   History of PE or DVT 0 Filed at: 05/19/2025 1704   Recent trauma or surgery 0 Filed at: 05/19/2025 1704   Hemoptysis 0 Filed at: 05/19/2025 1704   Exogenous estrogen 0 Filed at: 05/19/2025 1704   Unilateral leg swelling 0 Filed at: 05/19/2025 1704   PERC Rule for PE Results 1 Filed at: 05/19/2025 1704            SBIRT 22yo+      Flowsheet Row Most Recent Value   Initial Alcohol Screen: US AUDIT-C     1. How often do you have a drink containing alcohol? 0 Filed at: 05/19/2025 1145   2. How many drinks containing alcohol do you have on a typical day you are drinking?  0 Filed at: 05/19/2025 1145   3b. FEMALE Any Age, or MALE 65+: How often do you have 4 or more drinks on one occassion? 0 Filed at: 05/19/2025 1145   Audit-C Score 0 Filed at: 05/19/2025 1145   GABINO: How many times in the past year have you...    Used an illegal drug or used a prescription medication for non-medical reasons? Never Filed at: 05/19/2025 1145            Wells' Criteria for PE      Flowsheet Row Most Recent Value   Wells' Criteria for PE    Clinical signs and symptoms of DVT 0 Filed at: 05/19/2025 1704   PE is primary diagnosis or equally likely 0 Filed at: 05/19/2025 1704   HR >100 0 Filed at: 05/19/2025 1704   Immobilization at least 3 days or Surgery in the previous 4 weeks 0 Filed at: 05/19/2025 1704   Previous, objectively diagnosed PE or DVT 0 Filed at: 05/19/2025 1704   Hemoptysis 0 Filed at: 05/19/2025 1704   Malignancy with treatment within 6 months or palliative 0 Filed at: 05/19/2025 1704   Wells' Criteria Total 0 Filed at: 05/19/2025 1704          Wells' Criteria for DVT      Flowsheet Row Most Recent Value   Wells' Criteria for DVT    Active cancer Treatment or palliation within 6 months 0 Filed at: 05/19/2025 1707   Bedridden recently >3 days or major surgery within 12 weeks 0 Filed at: 05/19/2025 1252    Calf swelling >3 cm compared to the other leg 0 Filed at: 2025 170   Entire leg swollen 0 Filed at: 2025   Collateral (nonvaricose) superficial veins present 0 Filed at: 2025 170   Localized tenderness along the deep venous system 0 Filed at: 2025 170   Pitting edema, confined to symptomatic leg 0 Filed at: 2025 170   Paralysis, paresis, or recent plaster immobilization of the lower extremity 0 Filed at: 2025 170   Previously documented DVT 0 Filed at: 2025 170   Alternative diagnosis to DVT as likely or more likely --   Wells DVT Critera Score 0 Filed at: 2025                      History of Present Illness       Chief Complaint   Patient presents with    Flank Pain     Left sided flank pain. Had UA done at PCP which showed blood in urine. Toradol given at office. Patient also reporting left leg pain.        Past Medical History:   Diagnosis Date    Allergic     Asthma due to seasonal allergies     Bronchitis     GERD (gastroesophageal reflux disease)     Kidney stone     Pneumonia     PONV (postoperative nausea and vomiting)     Urinary tract infection       Past Surgical History:   Procedure Laterality Date    ABDOMINAL SURGERY  1995    Csection    APPENDECTOMY  10/1982     SECTION      two-  &     COLONOSCOPY      EGD      HYSTERECTOMY      partial-    JOINT REPLACEMENT      KS LAPAROSCOPY SURG CHOLECYSTECTOMY N/A 2025    Procedure: CHOLECYSTECTOMY LAPAROSCOPIC, POSSIBLE OPEN, POSSIBLE CHOLANGIOGRAM;  Surgeon: Kary Santana MD;  Location: [facility] OR;  Service: General    REPLACEMENT TOTAL KNEE BILATERAL      right- , left-     TONSILLECTOMY      WISDOM TOOTH EXTRACTION        Family History   Problem Relation Age of Onset    Heart disease Father     Hypertension Father     Urolithiasis Mother     Colon cancer Mother     Arthritis Mother     Cancer Mother         Colon cancer    Hearing loss Mother     Kidney  cancer Brother     Urolithiasis Brother     Hepatitis Brother         hep c    Kidney disease Brother     Urolithiasis Sister     Diabetes Sister     Kidney disease Sister     Colon polyps Sister     Urolithiasis Daughter       Social History[1]   E-Cigarette/Vaping    E-Cigarette Use Never User       E-Cigarette/Vaping Substances    Nicotine No     THC No     CBD No     Flavoring No     Other No     Unknown No       I have reviewed and agree with the history as documented.     HPI  1 week of intermittent pain, worse this AM  Review of Systems   Constitutional: Negative.  Negative for chills, diaphoresis, fatigue and fever.   HENT: Negative.  Negative for drooling, ear discharge, ear pain, facial swelling, sore throat, tinnitus, trouble swallowing and voice change.    Eyes: Negative.    Respiratory: Negative.  Negative for chest tightness and shortness of breath.    Cardiovascular: Negative.  Negative for chest pain and leg swelling.   Gastrointestinal:  Negative for anal bleeding, blood in stool, constipation, diarrhea, nausea, rectal pain and vomiting.   Endocrine: Negative.    Genitourinary: Negative.  Negative for dysuria.   Musculoskeletal:  Positive for back pain. Negative for joint swelling, neck pain and neck stiffness.   Skin: Negative.  Negative for pallor and wound.   Allergic/Immunologic: Negative.    Neurological: Negative.    Hematological: Negative.    Psychiatric/Behavioral: Negative.             Objective       ED Triage Vitals [05/19/25 1143]   Temperature Pulse Blood Pressure Respirations SpO2 Patient Position - Orthostatic VS   97.6 °F (36.4 °C) 72 164/82 18 98 % Sitting      Temp Source Heart Rate Source BP Location FiO2 (%) Pain Score    Temporal Monitor Left arm -- 10 - Worst Possible Pain      Vitals      Date and Time Temp Pulse SpO2 Resp BP Pain Score FACES Pain Rating User   05/19/25 1540 -- 66 97 % 18 135/62 -- -- EM   05/19/25 1445 -- -- -- -- -- 9 -- EM   05/19/25 1415 -- 83 96 % 18  150/74 9 --    05/19/25 1330 -- 78 97 % 18 -- -- -- EM   05/19/25 1210 -- -- -- -- -- 10 - Worst Possible Pain --    05/19/25 1143 97.6 °F (36.4 °C) 72 98 % 18 164/82 10 - Worst Possible Pain -- SG            Physical Exam    Results Reviewed       Procedure Component Value Units Date/Time    Comprehensive metabolic panel [[id number]] Collected: 05/19/25 1207    Lab Status: Final result Specimen: Blood from Arm, Right Updated: 05/19/25 1240     Sodium 139 mmol/L      Potassium 4.2 mmol/L      Chloride 105 mmol/L      CO2 28 mmol/L      ANION GAP 6 mmol/L      BUN 16 mg/dL      Creatinine 0.72 mg/dL      Glucose 97 mg/dL      Calcium 9.3 mg/dL      AST 15 U/L      ALT 23 U/L      Alkaline Phosphatase 67 U/L      Total Protein 6.7 g/dL      Albumin 4.4 g/dL      Total Bilirubin 0.52 mg/dL      eGFR 91 ml/min/1.73sq m     Narrative:      National Kidney Disease Foundation guidelines for Chronic Kidney Disease (CKD):     Stage 1 with normal or high GFR (GFR > 90 mL/min/1.73 square meters)    Stage 2 Mild CKD (GFR = 60-89 mL/min/1.73 square meters)    Stage 3A Moderate CKD (GFR = 45-59 mL/min/1.73 square meters)    Stage 3B Moderate CKD (GFR = 30-44 mL/min/1.73 square meters)    Stage 4 Severe CKD (GFR = 15-29 mL/min/1.73 square meters)    Stage 5 End Stage CKD (GFR <15 mL/min/1.73 square meters)  Note: GFR calculation is accurate only with a steady state creatinine    Lipase [[id number]]  (Normal) Collected: 05/19/25 1207    Lab Status: Final result Specimen: Blood from Arm, Right Updated: 05/19/25 1240     Lipase 17 u/L     D-Dimer [[id number]]  (Normal) Collected: 05/19/25 1207    Lab Status: Final result Specimen: Blood from Arm, Right Updated: 05/19/25 1237     D-Dimer, Quant <0.27 ug/ml FEU     Narrative:      In the evaluation for possible pulmonary embolism, in the appropriate (Well's Score of 4 or less) patient, the age adjusted d-dimer cutoff for this patient can be calculated as:    Age x 0.01 (in ug/mL) for  Age-adjusted D-dimer exclusion threshold for a patient over 50 years.    CBC and differential [[id number]]  (Abnormal) Collected: 05/19/25 1207    Lab Status: Final result Specimen: Blood from Arm, Right Updated: 05/19/25 1226     WBC 6.34 Thousand/uL      RBC 5.34 Million/uL      Hemoglobin 14.5 g/dL      Hematocrit 46.6 %      MCV 87 fL      MCH 27.2 pg      MCHC 31.1 g/dL      RDW 14.1 %      MPV 9.9 fL      Platelets 291 Thousands/uL      nRBC 0 /100 WBCs      Segmented % 60 %      Immature Grans % 0 %      Lymphocytes % 30 %      Monocytes % 7 %      Eosinophils Relative 2 %      Basophils Relative 1 %      Absolute Neutrophils 3.81 Thousands/µL      Absolute Immature Grans 0.02 Thousand/uL      Absolute Lymphocytes 1.87 Thousands/µL      Absolute Monocytes 0.44 Thousand/µL      Eosinophils Absolute 0.15 Thousand/µL      Basophils Absolute 0.05 Thousands/µL             CT abdomen pelvis with contrast   Final Interpretation by Kirstie Butler MD (05/19 1428)      No hydronephrosis   No obstructing calculus      There is increasing hazy infiltration in the mesentery in the left upper abdomen, 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 However short interval follow-up at 3 months suggested to demonstrate resolution   The study was marked in EPIC for immediate notification.      Workstation performed: [id number]             Procedures    ED Medication and Procedure Management   Prior to Admission Medications   Prescriptions Last Dose Informant Patient Reported? Taking?   CRANBERRY PO   Yes No   Sig: Take 1 capsule by mouth 3 (three) times a week   VITAMIN D PO   Yes No   Sig: Take by mouth every other day   albuterol (ProAir HFA) 90 mcg/act inhaler   No No   Sig: Inhale 2 puffs every 6 (six) hours as needed for wheezing   amitriptyline (ELAVIL) 10 mg tablet   No No   Sig: Take 1 tablet (10 mg total) by mouth daily at bedtime   cetirizine (ZyrTEC) 10 mg tablet  Self Yes No   Sig: Take 10 mg by  mouth   clotrimazole-betamethasone (LOTRISONE) 1-0.05 % cream  Self Yes No   Sig: Apply topically in the morning   dicyclomine (BENTYL) 20 mg tablet   No No   Sig: Take 1 tablet (20 mg total) by mouth 3 (three) times a day   Patient taking differently: Take 20 mg by mouth if needed   diphenoxylate-atropine (LOMOTIL) 2.5-0.025 mg per tablet  Self No No   Sig: Take 1 tablet by mouth 4 (four) times a day as needed for diarrhea   Patient not taking: Reported on 2025   famotidine (PEPCID) 20 mg tablet   No No   Sig: Take 1 tablet (20 mg total) by mouth 2 (two) times a day   Patient not taking: Reported on 2025   fluticasone (FLONASE) 50 mcg/act nasal spray   No No   Si spray into each nostril daily   Patient not taking: Reported on 2025   omeprazole (PriLOSEC) 40 MG capsule   No No   Sig: Take 1 capsule (40 mg total) by mouth daily Take 1 PO QD in AM prior to a meal.   ondansetron (ZOFRAN) 4 mg tablet  Self No No   Sig: Take 1 tablet (4 mg total) by mouth every 8 (eight) hours as needed for nausea or vomiting   tirzepatide (Zepbound) 2.5 mg/0.5 mL auto-injector   No No   Sig: Inject 0.5 mL (2.5 mg total) under the skin once a week      Facility-Administered Medications Last Administration Doses Remaining   ketorolac (TORADOL) 60 mg/2 mL IM injection 60 mg 2025 10:52 AM 0        Discharge Medication List as of 2025  3:44 PM        START taking these medications    Details   methocarbamol (ROBAXIN) 500 mg tablet Take 1 tablet (500 mg total) by mouth 2 (two) times a day for 5 days, Starting Mon 2025, Until Sat 2025, Normal      naproxen (Naprosyn) 500 mg tablet Take 1 tablet (500 mg total) by mouth 2 (two) times a day with meals for 5 days, Starting Mon 2025, Until Sat 2025, Normal           CONTINUE these medications which have NOT CHANGED    Details   albuterol (ProAir HFA) 90 mcg/act inhaler Inhale 2 puffs every 6 (six) hours as needed for wheezing, Starting Thu 3/27/2025,  Normal      amitriptyline (ELAVIL) 10 mg tablet Take 1 tablet (10 mg total) by mouth daily at bedtime, Starting Tue 5/13/2025, Normal      cetirizine (ZyrTEC) 10 mg tablet Take 10 mg by mouth, Historical Med      clotrimazole-betamethasone (LOTRISONE) 1-0.05 % cream Apply topically in the morning, Starting Thu 8/1/2019, Historical Med      CRANBERRY PO Take 1 capsule by mouth 3 (three) times a week, Historical Med      dicyclomine (BENTYL) 20 mg tablet Take 1 tablet (20 mg total) by mouth 3 (three) times a day, Starting Wed 2/12/2025, Normal      diphenoxylate-atropine (LOMOTIL) 2.5-0.025 mg per tablet Take 1 tablet by mouth 4 (four) times a day as needed for diarrhea, Starting Tue 10/1/2024, Normal      famotidine (PEPCID) 20 mg tablet Take 1 tablet (20 mg total) by mouth 2 (two) times a day, Starting Wed 2/12/2025, Normal      fluticasone (FLONASE) 50 mcg/act nasal spray 1 spray into each nostril daily, Starting Thu 3/27/2025, Normal      omeprazole (PriLOSEC) 40 MG capsule Take 1 capsule (40 mg total) by mouth daily Take 1 PO QD in AM prior to a meal., Starting Wed 2/12/2025, Normal      ondansetron (ZOFRAN) 4 mg tablet Take 1 tablet (4 mg total) by mouth every 8 (eight) hours as needed for nausea or vomiting, Starting Thu 4/4/2024, Normal      tirzepatide (Zepbound) 2.5 mg/0.5 mL auto-injector Inject 0.5 mL (2.5 mg total) under the skin once a week, Starting Tue 5/13/2025, Normal      VITAMIN D PO Take by mouth every other day, Historical Med             ED SEPSIS DOCUMENTATION   Time reflects when diagnosis was documented in both MDM as applicable and the Disposition within this note       Time User Action Codes Description Comment    5/19/2025  3:19 PM Mark Calvert Add [D17.71] Angiomyolipoma of right kidney     5/19/2025  3:19 PM Mark Calvert Remove [D17.71] Angiomyolipoma of right kidney     5/19/2025  3:19 PM Mark Calvert Add [D17.71] Angiomyolipoma of left kidney     5/19/2025  3:25 PM Enrike  Mark Add [R10.9] Flank pain     5/19/2025  3:32 PM Mark Calvert Add [M54.9] Back pain                      [1]   Social History  Tobacco Use    Smoking status: Never    Smokeless tobacco: Never   Vaping Use    Vaping status: Never Used   Substance Use Topics    Alcohol use: Yes     Alcohol/week: 4.0 standard drinks of alcohol     Types: 2 Glasses of wine, 2 Shots of liquor per week     Comment: occasional    Drug use: No        Mark Calvert III, DO  05/19/25 0686

## 2025-05-20 ENCOUNTER — VBI (OUTPATIENT)
Dept: FAMILY MEDICINE CLINIC | Facility: CLINIC | Age: 60
End: 2025-05-20

## 2025-05-20 ENCOUNTER — TELEPHONE (OUTPATIENT)
Dept: PHYSICAL THERAPY | Facility: OTHER | Age: 60
End: 2025-05-20

## 2025-05-20 LAB — BACTERIA UR CULT: NORMAL

## 2025-05-20 NOTE — TELEPHONE ENCOUNTER
Call placed to the patient per [facility] referral.    Spoke with the patient and offered her triage for PT eval. She seemed interested however told me PT is not covered under her insurance unless she has been admitted. I suggested she call or follow up with her PCP again to discuss referrals other than PT    closed

## 2025-05-20 NOTE — TELEPHONE ENCOUNTER
05/20/25 3:07 PM    Patient contacted post ED visit, [facility] department spoke with patient/caregiver and outreach was successful.    Thank you.  Cruzito Carson MA  [facility]

## 2025-05-21 ENCOUNTER — RESULTS FOLLOW-UP (OUTPATIENT)
Dept: FAMILY MEDICINE CLINIC | Facility: CLINIC | Age: 60
End: 2025-05-21

## 2025-06-10 DIAGNOSIS — E66.01 CLASS 2 SEVERE OBESITY WITH SERIOUS COMORBIDITY AND BODY MASS INDEX (BMI) OF 35.0 TO 35.9 IN ADULT, UNSPECIFIED OBESITY TYPE (HCC): ICD-10-CM

## 2025-06-10 DIAGNOSIS — E66.812 CLASS 2 SEVERE OBESITY WITH SERIOUS COMORBIDITY AND BODY MASS INDEX (BMI) OF 35.0 TO 35.9 IN ADULT, UNSPECIFIED OBESITY TYPE (HCC): ICD-10-CM

## 2025-06-10 RX ORDER — TIRZEPATIDE 2.5 MG/.5ML
2.5 INJECTION, SOLUTION SUBCUTANEOUS WEEKLY
Qty: 2 ML | Refills: 0 | Status: SHIPPED | OUTPATIENT
Start: 2025-06-10

## 2025-06-10 NOTE — TELEPHONE ENCOUNTER
Scheduled patient for a weight check on 6/26 that was the soonest she could do. Patient reports her last injection is on this coming Tuesday the 17th and will need a refill until she can be seen on 6/26.

## 2025-06-10 NOTE — TELEPHONE ENCOUNTER
Reason for call:   [x] Refill   [] Prior Auth  [] Other:     Office:   [x] PCP/Provider - Sergio  [] Specialty/Provider -     Medication: zepbound 2.5 mg    Pharmacy:   [facility]    Local Pharmacy   Does the patient have enough for 3 days?   [x] Yes   [] No - Send as HP to POD    Mail Away Pharmacy   Does the patient have enough for 10 days?   [] Yes   [] No - Send as HP to POD

## 2025-06-17 ENCOUNTER — RA CDI HCC (OUTPATIENT)
Dept: OTHER | Facility: HOSPITAL | Age: 60
End: 2025-06-17

## 2025-06-26 ENCOUNTER — OFFICE VISIT (OUTPATIENT)
Dept: FAMILY MEDICINE CLINIC | Facility: CLINIC | Age: 60
End: 2025-06-26
Payer: COMMERCIAL

## 2025-06-26 VITALS
BODY MASS INDEX: 35.6 KG/M2 | SYSTOLIC BLOOD PRESSURE: 130 MMHG | HEART RATE: 70 BPM | OXYGEN SATURATION: 99 % | HEIGHT: 67 IN | TEMPERATURE: 97.5 F | WEIGHT: 226.8 LBS | DIASTOLIC BLOOD PRESSURE: 72 MMHG

## 2025-06-26 DIAGNOSIS — L23.9 ALLERGIC CONTACT DERMATITIS, UNSPECIFIED TRIGGER: ICD-10-CM

## 2025-06-26 DIAGNOSIS — Z76.89 ENCOUNTER FOR WEIGHT MANAGEMENT: ICD-10-CM

## 2025-06-26 DIAGNOSIS — L03.211 CELLULITIS OF FACE: Primary | ICD-10-CM

## 2025-06-26 PROCEDURE — 99214 OFFICE O/P EST MOD 30 MIN: CPT | Performed by: STUDENT IN AN ORGANIZED HEALTH CARE EDUCATION/TRAINING PROGRAM

## 2025-06-26 RX ORDER — TIRZEPATIDE 5 MG/.5ML
5 INJECTION, SOLUTION SUBCUTANEOUS WEEKLY
Qty: 2 ML | Refills: 0 | Status: SHIPPED | OUTPATIENT
Start: 2025-06-26

## 2025-06-26 RX ORDER — TIRZEPATIDE 5 MG/.5ML
5 INJECTION, SOLUTION SUBCUTANEOUS WEEKLY
Qty: 2 ML | Refills: 0 | Status: SHIPPED | OUTPATIENT
Start: 2025-06-26 | End: 2025-06-26

## 2025-06-26 RX ORDER — CEPHALEXIN 500 MG/1
500 CAPSULE ORAL EVERY 8 HOURS SCHEDULED
Qty: 21 CAPSULE | Refills: 0 | Status: SHIPPED | OUTPATIENT
Start: 2025-06-26 | End: 2025-07-03

## 2025-06-26 RX ORDER — TRIAMCINOLONE ACETONIDE 1 MG/G
CREAM TOPICAL 2 TIMES DAILY
Qty: 45 G | Refills: 0 | Status: SHIPPED | OUTPATIENT
Start: 2025-06-26

## 2025-06-26 NOTE — PROGRESS NOTES
Name: Sara Harrell      : 1965      MRN: [id number]  Encounter Provider: Yomi Hill MD  Encounter Date: 2025   Encounter department: [facility]  :  Assessment & Plan  Cellulitis of face    Orders:    cephalexin (KEFLEX) 500 mg capsule; Take 1 capsule (500 mg total) by mouth every 8 (eight) hours for 7 days    Allergic contact dermatitis, unspecified trigger    Orders:    triamcinolone (KENALOG) 0.1 % cream; Apply topically 2 (two) times a day    Encounter for weight management    Orders:    tirzepatide (Zepbound) 5 mg/0.5 mL auto-injector; Inject 0.5 mL (5 mg total) under the skin once a week    Continue to stress lifestyle modifications          History of Present Illness   Rash  Pertinent negatives include no congestion, cough, diarrhea, fatigue, fever, rhinorrhea or shortness of breath.         60 yof presents with multiple concerns.  Presents with a rash on her right face that started yesterday around her right eye and has been getting worse. Using OTC cream, with minimal improvement     Review of Systems   Constitutional:  Negative for activity change, appetite change, chills, fatigue and fever.   HENT:  Negative for congestion, dental problem, drooling, ear discharge, ear pain, facial swelling, postnasal drip, rhinorrhea and sinus pain.    Eyes:  Negative for photophobia, pain, discharge and itching.   Respiratory:  Negative for apnea, cough, chest tightness and shortness of breath.    Cardiovascular:  Negative for chest pain and leg swelling.   Gastrointestinal:  Negative for abdominal distention, abdominal pain, anal bleeding, constipation, diarrhea and nausea.   Endocrine: Negative for cold intolerance, heat intolerance and polydipsia.   Genitourinary:  Negative for difficulty urinating.   Musculoskeletal:  Negative for arthralgias, gait problem, joint swelling and myalgias.   Skin:  Positive for rash. Negative for color change and pallor.  "  Allergic/Immunologic: Negative for immunocompromised state.   Neurological:  Negative for dizziness, seizures, facial asymmetry, weakness, light-headedness, numbness and headaches.   Psychiatric/Behavioral:  Negative for agitation, behavioral problems, confusion, decreased concentration and dysphoric mood.    All other systems reviewed and are negative.      Objective   /72 (BP Location: Left arm, Patient Position: Sitting, Cuff Size: Adult)   Pulse 70   Temp 97.5 °F (36.4 °C) (Tympanic)   Ht 5' 7\" (1.702 m)   Wt 103 kg (226 lb 12.8 oz)   LMP  (LMP Unknown)   SpO2 99%   BMI 35.52 kg/m²      Physical Exam  Vitals and nursing note reviewed.   Constitutional:       General: She is not in acute distress.     Appearance: She is well-developed.   HENT:      Head: Normocephalic and atraumatic.     Eyes:      Conjunctiva/sclera: Conjunctivae normal.      Pupils: Pupils are equal, round, and reactive to light.       Cardiovascular:      Rate and Rhythm: Normal rate and regular rhythm.      Heart sounds: Normal heart sounds. No murmur heard.     No friction rub.   Pulmonary:      Effort: Pulmonary effort is normal.      Breath sounds: Normal breath sounds.   Abdominal:      General: Bowel sounds are normal.      Palpations: Abdomen is soft.     Musculoskeletal:         General: Normal range of motion.      Cervical back: Normal range of motion and neck supple.     Skin:     General: Skin is warm.      Capillary Refill: Capillary refill takes less than 2 seconds.      Comments: Small area of cellulitis to the inferior right orbital space, no involement of the eye       Contact dermatitis of right flank        Neurological:      Mental Status: She is alert and oriented to person, place, and time.      Motor: No abnormal muscle tone.      Coordination: Coordination normal.     Psychiatric:         Behavior: Behavior normal.         Thought Content: Thought content normal.         "

## 2025-06-27 ENCOUNTER — TELEPHONE (OUTPATIENT)
Age: 60
End: 2025-06-27

## 2025-06-27 NOTE — TELEPHONE ENCOUNTER
Patient seen in the office yesterday with rash to face, started on Keflex and triamcinolone cream. Was told to call back if rash worsens/  Today patient noticed she has more redness around right eye/eyelid, and its puffy. Also more redness to right side of mouth and slightly puffy. Patient denies swelling on the inside of mouth.  Patient has taken 3 doses of antibiotic thus far.  Patient is away at [facility]. Patient sent a My chart message of her rash.    Called the office to confirm they received picture, they will be calling patient with recommendations from provider.

## 2025-07-09 ENCOUNTER — TELEPHONE (OUTPATIENT)
Dept: FAMILY MEDICINE CLINIC | Facility: CLINIC | Age: 60
End: 2025-07-09

## 2025-07-09 NOTE — TELEPHONE ENCOUNTER
PA for (Zepbound) 5 mg/0.5 mL auto-injector SUBMITTED to     via    [x][facility]-KEY: [id number]  []Surescripts-Case ID #   []Availity-Auth ID # NDC #   []Faxed to plan   []Other website   []Phone call Case ID #     [x]PA sent as URGENT    All office notes, labs and other pertaining documents and studies sent. Clinical questions answered. Awaiting determination from insurance company.     Turnaround time for your insurance to make a decision on your Prior Authorization can take 7-21 business days.

## 2025-07-09 NOTE — TELEPHONE ENCOUNTER
Patient states insurance told her that she reached her max and needs an approval for increased dosage.     Reason for call:   [] Refill   [x] Prior Auth  [] Other:     Office:   [x] PCP/Provider -   [] Specialty/Provider -     Medication: tirzepatide (Zepbound) 5 mg/0.5 mL auto-injector - Inject 0.5 mL (5 mg total) under the skin once a week       Ph[facility]acy: [facility]     Local Pharmacy   Does the patient have enough for 3 days?   [x] Yes   [] No - Send as HP to POD    Mail Away Pharmacy   Does the patient have enough for 10 days?   [] Yes   [] No - Send as HP to POD    How are you tolerating the medication?   [] Nausea  [] Vomiting  [] Diarrhea  [x] Asymptomatic  [] Other:    Last visit weight: 226 lb     Current weight: 224 lb     Date of last injection: 7/8/2025    How many injections do you have left:  1

## 2025-07-10 NOTE — TELEPHONE ENCOUNTER
PA for (Zepbound) 5 mg/0.5 mL auto-injector  NOT REQUIRED     Reason (screenshot if applicable)          Pharmacy advised by    [x]Fax  []Phone call

## 2025-07-15 DIAGNOSIS — Z76.89 ENCOUNTER FOR WEIGHT MANAGEMENT: ICD-10-CM

## 2025-07-15 RX ORDER — TIRZEPATIDE 5 MG/.5ML
5 INJECTION, SOLUTION SUBCUTANEOUS WEEKLY
Qty: 2 ML | Refills: 0 | Status: SHIPPED | OUTPATIENT
Start: 2025-07-15 | End: 2025-07-17 | Stop reason: SDUPTHER

## 2025-07-17 RX ORDER — TIRZEPATIDE 5 MG/.5ML
5 INJECTION, SOLUTION SUBCUTANEOUS WEEKLY
Qty: 2 ML | Refills: 0 | Status: SHIPPED | OUTPATIENT
Start: 2025-07-17

## 2025-07-17 NOTE — TELEPHONE ENCOUNTER
This is not a duplicate.   Change pharmacy  [facility] not fill [facility] script. [facility]

## 2025-08-12 ENCOUNTER — OCCMED (OUTPATIENT)
Dept: URGENT CARE | Facility: CLINIC | Age: 60
End: 2025-08-12

## (undated) DEVICE — TUBE SET SMOKE EVAC PNEUMOCLEAR HIGH FLOW

## (undated) DEVICE — 4-PORT MANIFOLD: Brand: NEPTUNE 2

## (undated) DEVICE — ENDOPATH XCEL BLADELESS TROCARS WITH STABILITY SLEEVES: Brand: ENDOPATH XCEL

## (undated) DEVICE — GLOVE SRG BIOGEL 7

## (undated) DEVICE — MICRO HVTSA, 0.5G AND HVTSA SOURCEMARK PRODUCT CODE M1206 AND M1206-01: Brand: EXOFIN MICRO HVTSA, 0.5G

## (undated) DEVICE — ENDOPOUCH RETRIEVER SPECIMEN RETRIEVAL BAGS: Brand: ENDOPOUCH RETRIEVER

## (undated) DEVICE — SUT MONOCRYL 4-0 PS-2 27 IN Y426H

## (undated) DEVICE — CHLORAPREP HI-LITE 26ML ORANGE

## (undated) DEVICE — CLIP APPLIER WITH CLIP LOGIC TECHNOLOGY: Brand: ENDO CLIP III

## (undated) DEVICE — PACK PBDS LAP CHOLE RF

## (undated) DEVICE — 3M™ TEGADERM™ TRANSPARENT FILM DRESSING FRAME STYLE, 1624W, 2-3/8 IN X 2-3/4 IN (6 CM X 7 CM), 100/CT 4CT/CASE: Brand: 3M™ TEGADERM™

## (undated) DEVICE — TROCARS: Brand: KII® BALLOON BLUNT TIP SYSTEM

## (undated) DEVICE — NEEDLE 25G X 1 1/2

## (undated) DEVICE — INTENDED FOR TISSUE SEPARATION, AND OTHER PROCEDURES THAT REQUIRE A SHARP SURGICAL BLADE TO PUNCTURE OR CUT.: Brand: BARD-PARKER ® CARBON RIB-BACK BLADES

## (undated) DEVICE — PENCIL ROCKER SWITCH CAUTERY HAND CONTROL

## (undated) DEVICE — 5 MM CURVED DISSECTORS WITH MONOPOLAR CAUTERY: Brand: ENDOPATH

## (undated) DEVICE — GAUZE SPONGES,8 PLY: Brand: CURITY

## (undated) DEVICE — ELECTRODE LAP L HOOK E-Z CLEAN 33CM -0020

## (undated) DEVICE — 2, DISPOSABLE SUCTION/IRRIGATOR WITHOUT DISPOSABLE TIP: Brand: STRYKEFLOW

## (undated) DEVICE — GLOVE INDICATOR PI UNDERGLOVE SZ 7 BLUE

## (undated) DEVICE — TROCAR: Brand: KII FIOS FIRST ENTRY

## (undated) DEVICE — GARMENT,MEDLINE,DVT,INT,CALF,FOAM,MED: Brand: MEDLINE

## (undated) DEVICE — LAPAROSCOPIC SCISSORS: Brand: EPIX LAPAROSCOPIC SCISSORS

## (undated) DEVICE — SUT VICRYL PLUS 0 UR-6 27IN VCP603H

## (undated) DEVICE — DISPOSABLE OR TOWEL: Brand: CARDINAL HEALTH